# Patient Record
Sex: MALE | Race: WHITE | Employment: OTHER | URBAN - METROPOLITAN AREA
[De-identification: names, ages, dates, MRNs, and addresses within clinical notes are randomized per-mention and may not be internally consistent; named-entity substitution may affect disease eponyms.]

---

## 2018-04-18 PROBLEM — N18.30 CHRONIC RENAL INSUFFICIENCY, STAGE III (MODERATE) (HCC): Status: ACTIVE | Noted: 2017-11-07

## 2018-04-18 PROBLEM — E78.2 MIXED HYPERLIPIDEMIA: Status: ACTIVE | Noted: 2017-04-04

## 2018-04-18 PROBLEM — C61 PROSTATE CANCER (HCC): Status: ACTIVE | Noted: 2018-04-18

## 2018-04-18 PROBLEM — I10 HYPERTENSION: Status: ACTIVE | Noted: 2018-04-18

## 2020-10-07 ENCOUNTER — HOSPITAL ENCOUNTER (EMERGENCY)
Age: 70
Discharge: HOME | End: 2020-10-07
Attending: EMERGENCY MEDICINE
Payer: MEDICARE

## 2020-10-07 VITALS
HEART RATE: 72 BPM | TEMPERATURE: 98 F | RESPIRATION RATE: 16 BRPM | DIASTOLIC BLOOD PRESSURE: 66 MMHG | HEIGHT: 69 IN | BODY MASS INDEX: 31.1 KG/M2 | WEIGHT: 210 LBS | SYSTOLIC BLOOD PRESSURE: 101 MMHG | OXYGEN SATURATION: 98 %

## 2020-10-07 DIAGNOSIS — M25.511 ACUTE PAIN OF RIGHT SHOULDER: Primary | ICD-10-CM

## 2020-10-07 PROCEDURE — 96372 THER/PROPH/DIAG INJ SC/IM: CPT

## 2020-10-07 PROCEDURE — 74011000250 HC RX REV CODE- 250: Performed by: EMERGENCY MEDICINE

## 2020-10-07 PROCEDURE — 74011250636 HC RX REV CODE- 250/636: Performed by: EMERGENCY MEDICINE

## 2020-10-07 PROCEDURE — 99283 EMERGENCY DEPT VISIT LOW MDM: CPT

## 2020-10-07 PROCEDURE — 74011250637 HC RX REV CODE- 250/637: Performed by: EMERGENCY MEDICINE

## 2020-10-07 RX ORDER — LIDOCAINE 50 MG/G
PATCH TOPICAL
Qty: 5 EACH | Refills: 0 | Status: SHIPPED | OUTPATIENT
Start: 2020-10-07 | End: 2020-10-16

## 2020-10-07 RX ORDER — NAPROXEN 500 MG/1
500 TABLET ORAL 2 TIMES DAILY WITH MEALS
Qty: 20 TAB | Refills: 0 | Status: SHIPPED | OUTPATIENT
Start: 2020-10-07 | End: 2020-10-16

## 2020-10-07 RX ORDER — ONDANSETRON 4 MG/1
4 TABLET, ORALLY DISINTEGRATING ORAL
Status: COMPLETED | OUTPATIENT
Start: 2020-10-07 | End: 2020-10-07

## 2020-10-07 RX ORDER — LIDOCAINE 4 G/100G
2 PATCH TOPICAL
Status: DISCONTINUED | OUTPATIENT
Start: 2020-10-07 | End: 2020-10-08 | Stop reason: HOSPADM

## 2020-10-07 RX ORDER — NAPROXEN 250 MG/1
500 TABLET ORAL
Status: COMPLETED | OUTPATIENT
Start: 2020-10-07 | End: 2020-10-07

## 2020-10-07 RX ORDER — MORPHINE SULFATE 4 MG/ML
4 INJECTION INTRAVENOUS
Status: COMPLETED | OUTPATIENT
Start: 2020-10-07 | End: 2020-10-07

## 2020-10-07 RX ORDER — MORPHINE SULFATE 15 MG/1
15 TABLET ORAL
Qty: 11 TAB | Refills: 0 | Status: SHIPPED | OUTPATIENT
Start: 2020-10-07 | End: 2020-10-16

## 2020-10-07 RX ADMIN — MORPHINE SULFATE 4 MG: 4 INJECTION INTRAVENOUS at 20:20

## 2020-10-07 RX ADMIN — ONDANSETRON 4 MG: 4 TABLET, ORALLY DISINTEGRATING ORAL at 20:19

## 2020-10-07 RX ADMIN — NAPROXEN 500 MG: 250 TABLET ORAL at 21:21

## 2020-10-07 NOTE — ED PROVIDER NOTES
[facility] Emergency Department  Arrival Date/Time: 10/7/2020 @ Karen Parish  MRN: [id number]      72 y.o. male    YOB: 1950   Telephone Information:   Mobile [phone] (home)     [facility] EMERGENCY DEPT HE/E  Seen on 10/7/2020 @ 7:46 PM      Today's Chief Complaint:   Chief Complaint   Patient presents with    Shoulder Pain     HPI: 41-year-old male who injured his rotator cuff doing bicep curls several weeks ago. Had an MRI which showed an injury. He is seeing Dr. Dung Pate at orthopedics and is scheduled for a rotator cuff repair in 2 days. He and his wife state that the pain medication he was given is not working. He has difficulty with taking other medications secondary to significant allergy. He states he is not slept for more than a couple hours in 7 days secondary to pain. States that the pain is not too bad during the day but as soon as he tries to lay down or even sit up to sleep it starts to hurt and wakes him up    No alleviating. He tried over-the-counter medication slings various pain medication various positions    No associated symptoms. HPI    Review of Systems: Review of Systems   Constitutional: Negative. Musculoskeletal: Negative for back pain. Past Medical History: Primary Care Doctor: Anushka Johnson, DO  Meds, Medical Hx,Surgical Hx, Family Hx, Social Hx are reviewed and at end of this note     Allergies: Allergies   Allergen Reactions    Hydrocodone Itching    Codeine Other (comments)     convulsions         Key Anti-Platelet Anticoagulant Meds     The patient is on no antiplatelet meds or anticoagulants. Physical Exam:  Nursing documentation reviewed. Patient Vitals for the past 24 hrs:   Temp Pulse Resp BP SpO2   10/07/20 1846 98 °F (36.7 °C) 72 16 101/66 98 %    Vital signs were reviewed. Physical Exam  Vitals signs and nursing note reviewed.    Constitutional:       General: He is not in acute distress. Appearance: Normal appearance. Eyes:      Pupils: Pupils are equal, round, and reactive to light. Neck:      Musculoskeletal: Normal range of motion. Cardiovascular:      Rate and Rhythm: Normal rate and regular rhythm. Pulses: Normal pulses. Heart sounds: Normal heart sounds. Musculoskeletal:      Comments: ttp of the right shoulder - no deformity   Neurological:      Mental Status: He is alert and oriented to person, place, and time. MEDICAL DECISION MAKING: (Including Differential Dx, and Plan)   73 yo male with right shoulder pain awaiting rotator cuff surgery on Friday      Plan: treat pain   MDM  This is a new problem that does not need additional workup   Labs/Radiographs/ECG were ordered:    CT/US/XRay/MRI were visualized by me:    Obtained and Reviewed Old Records or History from someone other than the patient: yes -- chart and wife     The patient's problem is:  moderate    Diagnostic Options are:   risk    Management Options are:  high risk     Data/Management:  (sandra)   Lab findings during this visit:   Recent Results (from the past 50 hour(s))   NOVEL CORONAVIRUS (COVID-19)    Collection Time: 10/06/20 10:49 AM   Result Value Ref Range    SARS-CoV-2, AMANDA Not Detected Not Detected   INPATIENT    Collection Time: 10/06/20 10:49 AM   Result Value Ref Range    Inpatient Comment      Radiology studies during this visit: No results found. Medications given in the ED:   Medications   lidocaine 4 % patch 2 Patch (2 Patches TransDERmal Apply Patch 10/7/20 2053)   morphine injection 4 mg (4 mg IntraMUSCular Given 10/7/20 2020)   ondansetron (ZOFRAN ODT) tablet 4 mg (4 mg Oral Given 10/7/20 2019)   naproxen (NAPROSYN) tablet 500 mg (500 mg Oral Given 10/7/20 2121)        Procedure Documentation:    (.addlac  .addabscess   . addreduction   . addintubation    . addprocdoc)      Procedures    Recheck and Additional Documentation:  (use .addrecheck  . addsepsis   . addstroke .addhip  . addhandoff  . addcctime . emergcnt )     Patient given shot of morphine. Improvement in his pain. No rash or lesions are noted. No difficulty breathing nausea    We will start him on a Lidoderm patch. Morphine IR for home. Discharged to follow-up Dr. Livier Laws for surgery. Other ED Course Notes:        I wore appropriate PPE throughout this patient's ED encounter. Assessment and Plan:      Disposition:   Discharged    Condition @ Disposition   stable   Time of Disposition   2059         Impression:     ICD-10-CM ICD-9-CM   1. Acute pain of right shoulder  M25.511 719.41        Follow-up:   Follow-up Information     Follow up With Specialties Details Why [facility] EMERGENCY DEPT Emergency Medicine  Come back to the ED if you get worse or develop any new problems [id number] [id number] [phone]         Discharge Medications:   Discharge Medication List as of 10/7/2020  8:59 PM      START taking these medications    Details   morphine IR (MS IR) 15 mg tablet Take 1 Tab by mouth every four (4) hours as needed for Pain for up to 3 days. Max Daily Amount: 90 mg., Print, Disp-11 Tab,R-0      lidocaine (Lidoderm) 5 % Apply patch to the affected area for 12 hours a day and remove for 12 hours a day., Print, Disp-5 Each,R-0      naproxen (Naprosyn) 500 mg tablet Take 1 Tab by mouth two (2) times daily (with meals) for 10 days. , Print, Disp-20 Tab,R-0         CONTINUE these medications which have NOT CHANGED    Details   atorvastatin (LIPITOR) 40 mg tablet TAKE 1 TABLET BY MOUTH EVERY DAY AT NIGHT, Normal, Disp-90 Tab,R-1      omega-3 fatty acids/fish oil (FISH OIL OMEGA 3-6-9 PO) Take  by mouth., Historical Med              Past Medical History:      Past Medical History:   Diagnosis Date    AION (acute ischemic optic neuropathy), bilateral     Hypercholesterolemia     Hypertension     Prostate cancer ([facility] [address] 2009    Wears hearing aid in both ears Past Surgical History:   Procedure Laterality Date    HX PROSTATE SURGERY      HX TONSIL AND ADENOIDECTOMY      HX TONSILLECTOMY      HX UROLOGICAL       Family History   Problem Relation Age of Onset    Diabetes Mother     Heart Disease Father     Cancer Sister     Heart Disease Sister       Social History     Tobacco Use    Smoking status: Former Smoker     Types: Cigarettes    Smokeless tobacco: Never Used   Substance Use Topics    Alcohol use: No    Drug use: No     Prior to Admission Medications   Prescriptions Last Dose Informant Patient Reported? Taking?   atorvastatin (LIPITOR) 40 mg tablet   No No   Sig: TAKE 1 TABLET BY MOUTH EVERY DAY AT NIGHT   omega-3 fatty acids/fish oil (FISH OIL OMEGA 3-6-9 PO)   Yes No   Sig: Take  by mouth.       Facility-Administered Medications: None

## 2020-10-08 ENCOUNTER — ANESTHESIA EVENT (OUTPATIENT)
Dept: SURGERY | Age: 70
DRG: 871 | End: 2020-10-08
Payer: MEDICARE

## 2020-10-08 NOTE — ED NOTES
I have reviewed discharge instructions with the patient. The patient verbalized understanding. Patient left ED via Discharge Method: ambulatory to Home with wife). Opportunity for questions and clarification provided. Patient given 3 scripts. To continue your aftercare when you leave the hospital, you may receive an automated call from our care team to check in on how you are doing. This is a free service and part of our promise to provide the best care and service to meet your aftercare needs.  If you have questions, or wish to unsubscribe from this service please call [phone]. Thank you for Choosing our [facility] Emergency Department.

## 2020-10-09 ENCOUNTER — HOSPITAL ENCOUNTER (OUTPATIENT)
Age: 70
Setting detail: OUTPATIENT SURGERY
Discharge: HOME | DRG: 871 | End: 2020-10-09
Attending: ORTHOPAEDIC SURGERY | Admitting: ORTHOPAEDIC SURGERY
Payer: MEDICARE

## 2020-10-09 ENCOUNTER — ANESTHESIA (OUTPATIENT)
Dept: SURGERY | Age: 70
DRG: 871 | End: 2020-10-09
Payer: MEDICARE

## 2020-10-09 VITALS
BODY MASS INDEX: 30.57 KG/M2 | TEMPERATURE: 97.6 F | WEIGHT: 207 LBS | OXYGEN SATURATION: 91 % | DIASTOLIC BLOOD PRESSURE: 65 MMHG | HEART RATE: 54 BPM | RESPIRATION RATE: 14 BRPM | SYSTOLIC BLOOD PRESSURE: 143 MMHG

## 2020-10-09 LAB — POTASSIUM BLD-SCNC: 4.2 MMOL/L (ref 3.5–5.1)

## 2020-10-09 PROCEDURE — 77030018673: Performed by: ORTHOPAEDIC SURGERY

## 2020-10-09 PROCEDURE — 77030010430: Performed by: ORTHOPAEDIC SURGERY

## 2020-10-09 PROCEDURE — 74011250636 HC RX REV CODE- 250/636: Performed by: NURSE ANESTHETIST, CERTIFIED REGISTERED

## 2020-10-09 PROCEDURE — 76010000161 HC OR TIME 1 TO 1.5 HR INTENSV-TIER 1: Performed by: ORTHOPAEDIC SURGERY

## 2020-10-09 PROCEDURE — 77030002960 HC SUT PASS S&N -C: Performed by: ORTHOPAEDIC SURGERY

## 2020-10-09 PROCEDURE — 76210000021 HC REC RM PH II 0.5 TO 1 HR: Performed by: ORTHOPAEDIC SURGERY

## 2020-10-09 PROCEDURE — 77030010509 HC AIRWY LMA MSK TELE -A: Performed by: ANESTHESIOLOGY

## 2020-10-09 PROCEDURE — C1713 ANCHOR/SCREW BN/BN,TIS/BN: HCPCS | Performed by: ORTHOPAEDIC SURGERY

## 2020-10-09 PROCEDURE — 74011250636 HC RX REV CODE- 250/636: Performed by: PHYSICIAN ASSISTANT

## 2020-10-09 PROCEDURE — 74011000250 HC RX REV CODE- 250: Performed by: NURSE ANESTHETIST, CERTIFIED REGISTERED

## 2020-10-09 PROCEDURE — 77030036560 HC SHLDR ARM PAD ABDUCTN S2SG -B: Performed by: ORTHOPAEDIC SURGERY

## 2020-10-09 PROCEDURE — 77030020275 HC MISC ORTHOPEDIC: Performed by: ORTHOPAEDIC SURGERY

## 2020-10-09 PROCEDURE — 77030006668 HC BLD SHV MENSCS STRY -B: Performed by: ORTHOPAEDIC SURGERY

## 2020-10-09 PROCEDURE — 74011250637 HC RX REV CODE- 250/637: Performed by: ANESTHESIOLOGY

## 2020-10-09 PROCEDURE — 77030033005 HC TBNG ARTHSC PMP STRY -B: Performed by: ORTHOPAEDIC SURGERY

## 2020-10-09 PROCEDURE — 77030041183 HC KT ACCESS POS ACUFX SN -B: Performed by: ORTHOPAEDIC SURGERY

## 2020-10-09 PROCEDURE — 77030027384 HC PRB ARTHSCP SERFAS STRY -C: Performed by: ORTHOPAEDIC SURGERY

## 2020-10-09 PROCEDURE — 77030002933 HC SUT MCRYL J&J -A: Performed by: ORTHOPAEDIC SURGERY

## 2020-10-09 PROCEDURE — 77030010427: Performed by: ORTHOPAEDIC SURGERY

## 2020-10-09 PROCEDURE — 76010010054 HC POST OP PAIN BLOCK: Performed by: ORTHOPAEDIC SURGERY

## 2020-10-09 PROCEDURE — 77030019605: Performed by: ORTHOPAEDIC SURGERY

## 2020-10-09 PROCEDURE — 76060000033 HC ANESTHESIA 1 TO 1.5 HR: Performed by: ORTHOPAEDIC SURGERY

## 2020-10-09 PROCEDURE — 74011250636 HC RX REV CODE- 250/636: Performed by: ANESTHESIOLOGY

## 2020-10-09 PROCEDURE — 77030004453 HC BUR SHV STRY -B: Performed by: ORTHOPAEDIC SURGERY

## 2020-10-09 PROCEDURE — 2709999900 HC NON-CHARGEABLE SUPPLY: Performed by: ORTHOPAEDIC SURGERY

## 2020-10-09 PROCEDURE — 76210000063 HC OR PH I REC FIRST 0.5 HR: Performed by: ORTHOPAEDIC SURGERY

## 2020-10-09 PROCEDURE — 74011250636 HC RX REV CODE- 250/636: Performed by: ORTHOPAEDIC SURGERY

## 2020-10-09 PROCEDURE — 77030040361 HC SLV COMPR DVT MDII -B: Performed by: ORTHOPAEDIC SURGERY

## 2020-10-09 PROCEDURE — 77030003666 HC NDL SPINAL BD -A: Performed by: ORTHOPAEDIC SURGERY

## 2020-10-09 PROCEDURE — 76942 ECHO GUIDE FOR BIOPSY: CPT | Performed by: ORTHOPAEDIC SURGERY

## 2020-10-09 PROCEDURE — 84132 ASSAY OF SERUM POTASSIUM: CPT

## 2020-10-09 PROCEDURE — 77030003602 HC NDL NRV BLK BBMI -B: Performed by: ANESTHESIOLOGY

## 2020-10-09 RX ORDER — FENTANYL CITRATE 50 UG/ML
100 INJECTION, SOLUTION INTRAMUSCULAR; INTRAVENOUS AS NEEDED
Status: COMPLETED | OUTPATIENT
Start: 2020-10-09 | End: 2020-10-09

## 2020-10-09 RX ORDER — NALOXONE HYDROCHLORIDE 0.4 MG/ML
0.1 INJECTION, SOLUTION INTRAMUSCULAR; INTRAVENOUS; SUBCUTANEOUS AS NEEDED
Status: DISCONTINUED | OUTPATIENT
Start: 2020-10-09 | End: 2020-10-09 | Stop reason: HOSPADM

## 2020-10-09 RX ORDER — HYDROMORPHONE HYDROCHLORIDE 2 MG/ML
0.5 INJECTION, SOLUTION INTRAMUSCULAR; INTRAVENOUS; SUBCUTANEOUS
Status: DISCONTINUED | OUTPATIENT
Start: 2020-10-09 | End: 2020-10-09 | Stop reason: HOSPADM

## 2020-10-09 RX ORDER — ONDANSETRON 2 MG/ML
INJECTION INTRAMUSCULAR; INTRAVENOUS AS NEEDED
Status: DISCONTINUED | OUTPATIENT
Start: 2020-10-09 | End: 2020-10-09 | Stop reason: HOSPADM

## 2020-10-09 RX ORDER — CEFAZOLIN SODIUM/WATER 2 G/20 ML
2 SYRINGE (ML) INTRAVENOUS ONCE
Status: COMPLETED | OUTPATIENT
Start: 2020-10-09 | End: 2020-10-09

## 2020-10-09 RX ORDER — ROPIVACAINE HYDROCHLORIDE 5 MG/ML
INJECTION, SOLUTION EPIDURAL; INFILTRATION; PERINEURAL
Status: COMPLETED | OUTPATIENT
Start: 2020-10-09 | End: 2020-10-09

## 2020-10-09 RX ORDER — DIPHENHYDRAMINE HYDROCHLORIDE 50 MG/ML
12.5 INJECTION, SOLUTION INTRAMUSCULAR; INTRAVENOUS
Status: DISCONTINUED | OUTPATIENT
Start: 2020-10-09 | End: 2020-10-09 | Stop reason: HOSPADM

## 2020-10-09 RX ORDER — SODIUM CHLORIDE, SODIUM LACTATE, POTASSIUM CHLORIDE, CALCIUM CHLORIDE 600; 310; 30; 20 MG/100ML; MG/100ML; MG/100ML; MG/100ML
75 INJECTION, SOLUTION INTRAVENOUS CONTINUOUS
Status: DISCONTINUED | OUTPATIENT
Start: 2020-10-09 | End: 2020-10-09 | Stop reason: HOSPADM

## 2020-10-09 RX ORDER — EPINEPHRINE 1 MG/ML
INJECTION, SOLUTION, CONCENTRATE INTRAVENOUS AS NEEDED
Status: DISCONTINUED | OUTPATIENT
Start: 2020-10-09 | End: 2020-10-09 | Stop reason: HOSPADM

## 2020-10-09 RX ORDER — EPHEDRINE SULFATE/0.9% NACL/PF 50 MG/5 ML
SYRINGE (ML) INTRAVENOUS AS NEEDED
Status: DISCONTINUED | OUTPATIENT
Start: 2020-10-09 | End: 2020-10-09 | Stop reason: HOSPADM

## 2020-10-09 RX ORDER — PROPOFOL 10 MG/ML
INJECTION, EMULSION INTRAVENOUS AS NEEDED
Status: DISCONTINUED | OUTPATIENT
Start: 2020-10-09 | End: 2020-10-09 | Stop reason: HOSPADM

## 2020-10-09 RX ORDER — LIDOCAINE HYDROCHLORIDE 10 MG/ML
0.1 INJECTION INFILTRATION; PERINEURAL AS NEEDED
Status: DISCONTINUED | OUTPATIENT
Start: 2020-10-09 | End: 2020-10-09 | Stop reason: HOSPADM

## 2020-10-09 RX ORDER — LIDOCAINE HYDROCHLORIDE 20 MG/ML
INJECTION, SOLUTION EPIDURAL; INFILTRATION; INTRACAUDAL; PERINEURAL AS NEEDED
Status: DISCONTINUED | OUTPATIENT
Start: 2020-10-09 | End: 2020-10-09 | Stop reason: HOSPADM

## 2020-10-09 RX ORDER — FLUMAZENIL 0.1 MG/ML
0.2 INJECTION INTRAVENOUS
Status: DISCONTINUED | OUTPATIENT
Start: 2020-10-09 | End: 2020-10-09 | Stop reason: HOSPADM

## 2020-10-09 RX ORDER — DEXAMETHASONE SODIUM PHOSPHATE 4 MG/ML
INJECTION, SOLUTION INTRA-ARTICULAR; INTRALESIONAL; INTRAMUSCULAR; INTRAVENOUS; SOFT TISSUE
Status: COMPLETED | OUTPATIENT
Start: 2020-10-09 | End: 2020-10-09

## 2020-10-09 RX ORDER — GLYCOPYRROLATE 0.2 MG/ML
INJECTION INTRAMUSCULAR; INTRAVENOUS AS NEEDED
Status: DISCONTINUED | OUTPATIENT
Start: 2020-10-09 | End: 2020-10-09 | Stop reason: HOSPADM

## 2020-10-09 RX ORDER — GABAPENTIN 300 MG/1
300 CAPSULE ORAL ONCE
Status: COMPLETED | OUTPATIENT
Start: 2020-10-09 | End: 2020-10-09

## 2020-10-09 RX ORDER — MIDAZOLAM HYDROCHLORIDE 1 MG/ML
2 INJECTION, SOLUTION INTRAMUSCULAR; INTRAVENOUS
Status: COMPLETED | OUTPATIENT
Start: 2020-10-09 | End: 2020-10-09

## 2020-10-09 RX ORDER — DEXAMETHASONE SODIUM PHOSPHATE 4 MG/ML
INJECTION, SOLUTION INTRA-ARTICULAR; INTRALESIONAL; INTRAMUSCULAR; INTRAVENOUS; SOFT TISSUE AS NEEDED
Status: DISCONTINUED | OUTPATIENT
Start: 2020-10-09 | End: 2020-10-09 | Stop reason: HOSPADM

## 2020-10-09 RX ORDER — SODIUM CHLORIDE, SODIUM LACTATE, POTASSIUM CHLORIDE, CALCIUM CHLORIDE 600; 310; 30; 20 MG/100ML; MG/100ML; MG/100ML; MG/100ML
100 INJECTION, SOLUTION INTRAVENOUS CONTINUOUS
Status: DISCONTINUED | OUTPATIENT
Start: 2020-10-09 | End: 2020-10-09 | Stop reason: HOSPADM

## 2020-10-09 RX ORDER — ACETAMINOPHEN 500 MG
1000 TABLET ORAL ONCE
Status: COMPLETED | OUTPATIENT
Start: 2020-10-09 | End: 2020-10-09

## 2020-10-09 RX ORDER — OXYCODONE HYDROCHLORIDE 5 MG/1
5 TABLET ORAL
Status: DISCONTINUED | OUTPATIENT
Start: 2020-10-09 | End: 2020-10-09 | Stop reason: HOSPADM

## 2020-10-09 RX ADMIN — GABAPENTIN 300 MG: 300 CAPSULE ORAL at 09:21

## 2020-10-09 RX ADMIN — PHENYLEPHRINE HYDROCHLORIDE 100 MCG: 10 INJECTION INTRAVENOUS at 11:13

## 2020-10-09 RX ADMIN — SODIUM CHLORIDE, SODIUM LACTATE, POTASSIUM CHLORIDE, AND CALCIUM CHLORIDE: 600; 310; 30; 20 INJECTION, SOLUTION INTRAVENOUS at 11:06

## 2020-10-09 RX ADMIN — ACETAMINOPHEN 1000 MG: 500 TABLET, FILM COATED ORAL at 09:21

## 2020-10-09 RX ADMIN — LIDOCAINE HYDROCHLORIDE 100 MG: 20 INJECTION, SOLUTION EPIDURAL; INFILTRATION; INTRACAUDAL; PERINEURAL at 10:25

## 2020-10-09 RX ADMIN — ROPIVACAINE HYDROCHLORIDE 30 ML: 150 INJECTION, SOLUTION EPIDURAL; INFILTRATION; PERINEURAL at 10:00

## 2020-10-09 RX ADMIN — Medication 10 MG: at 10:40

## 2020-10-09 RX ADMIN — DEXAMETHASONE SODIUM PHOSPHATE 10 MG: 4 INJECTION, SOLUTION INTRAMUSCULAR; INTRAVENOUS at 10:40

## 2020-10-09 RX ADMIN — Medication 2 G: at 10:35

## 2020-10-09 RX ADMIN — PHENYLEPHRINE HYDROCHLORIDE 100 MCG: 10 INJECTION INTRAVENOUS at 11:02

## 2020-10-09 RX ADMIN — DEXAMETHASONE SODIUM PHOSPHATE 4 MG: 4 INJECTION, SOLUTION INTRAMUSCULAR; INTRAVENOUS at 10:00

## 2020-10-09 RX ADMIN — PHENYLEPHRINE HYDROCHLORIDE 100 MCG: 10 INJECTION INTRAVENOUS at 10:49

## 2020-10-09 RX ADMIN — FENTANYL CITRATE 50 MCG: 50 INJECTION INTRAMUSCULAR; INTRAVENOUS at 09:59

## 2020-10-09 RX ADMIN — PROPOFOL 200 MG: 10 INJECTION, EMULSION INTRAVENOUS at 10:25

## 2020-10-09 RX ADMIN — GLYCOPYRROLATE 0.2 MG: 0.2 INJECTION, SOLUTION INTRAMUSCULAR; INTRAVENOUS at 10:33

## 2020-10-09 RX ADMIN — ONDANSETRON 4 MG: 2 INJECTION INTRAMUSCULAR; INTRAVENOUS at 10:40

## 2020-10-09 RX ADMIN — MIDAZOLAM 2 MG: 1 INJECTION INTRAMUSCULAR; INTRAVENOUS at 09:59

## 2020-10-09 RX ADMIN — Medication 12.5 MG: at 10:32

## 2020-10-09 RX ADMIN — SODIUM CHLORIDE, SODIUM LACTATE, POTASSIUM CHLORIDE, AND CALCIUM CHLORIDE 100 ML/HR: 600; 310; 30; 20 INJECTION, SOLUTION INTRAVENOUS at 09:21

## 2020-10-09 NOTE — H&P
Outpatient Surgery History and Physical:  Leonard Perez was seen and examined. CHIEF COMPLAINT:   Right shoulder pain. PE:     Visit Vitals  BP (!) 191/86 (BP 1 Location: Left arm)   Pulse (!) 54   Temp 98.1 °F (36.7 °C)   Resp 16   Wt 93.9 kg (207 lb)   SpO2 96%   BMI 30.57 kg/m²       Heart:   Regular rhythm      Lungs:  Are clear      Past Medical History:    Patient Active Problem List    Diagnosis    Hypertension     Last Assessment & Plan:   Blood pressure at goal, continue current treatment      Prostate cancer ([facility] [address]     Overview:   s/p postatectomy    Last Assessment & Plan:   Status post prostatectomy most recent PSA 0.01 recheck yearly      Chronic renal insufficiency, stage III (moderate)     Last Assessment & Plan:   Renal function stable. Strongly encouraged to avoid all anti-inflammatories      Mixed hyperlipidemia     Last Assessment & Plan:   Lipids at goal continue current treatment      Erectile dysfunction following simple prostatectomy     Last Assessment & Plan:   Medications refilled at mail order pharmacy         Surgical History:   Past Surgical History:   Procedure Laterality Date    HX HEART CATHETERIZATION  2011    no intervention, normal    HX PROSTATE SURGERY  11/2016    HX TONSIL AND ADENOIDECTOMY         Social History: Patient  reports that he quit smoking about 49 years ago. His smoking use included cigarettes. He has never used smokeless tobacco. He reports that he does not drink alcohol or use drugs. Family History:   Family History   Problem Relation Age of Onset    Diabetes Mother     Heart Disease Father     Cancer Sister     Heart Disease Sister        Allergies: Reviewed per EMR  Allergies   Allergen Reactions    Hydrocodone Itching     Pt states incorrect/ allergy is to oxycodone.  Pt states he can tolerate hydrocodone 10/8/20 Please remove    Codeine Other (comments)     convulsions    Oxycodone Itching     Can tolerate Hydrocodone       Medications: No current facility-administered medications on file prior to encounter. Current Outpatient Medications on File Prior to Encounter   Medication Sig    atorvastatin (LIPITOR) 40 mg tablet TAKE 1 TABLET BY MOUTH EVERY DAY AT NIGHT (Patient taking differently: Take 40 mg by mouth nightly. TAKE 1 TABLET BY MOUTH EVERY DAY AT NIGHT)    omega-3 fatty acids/fish oil (FISH OIL OMEGA 3-6-9 PO) Take  by mouth. The surgery is planned for the right shoulder arthroscopy rotator cuff repair. History and physical has been reviewed. The patient has been examined. There have been no significant clinical changes since the completion of the originally dated History and Physical.  Patient identified by surgeon; surgical site was confirmed by patient and surgeon. The patient is here today for outpatient surgery. I have examined the patient, no changes are noted in the patient's medical status. Necessity for the procedure/care is still present and the history and physical above is current. See the office notes for the full long term history of the problem. Please see the recent office notes for the musculoskeletal examination.     Signed By: JAMAICA Rodriguez     October 9, 2020 9:07 AM

## 2020-10-09 NOTE — ANESTHESIA POSTPROCEDURE EVALUATION
Procedure(s):  RIGHT SHOULDER ARTHROSCOPY/ ROTATOR CUFF REPAIR.     general    Anesthesia Post Evaluation      Multimodal analgesia: multimodal analgesia used between 6 hours prior to anesthesia start to PACU discharge  Patient location during evaluation: PACU  Patient participation: complete - patient participated  Level of consciousness: awake and alert  Pain management: adequate  Airway patency: patent  Anesthetic complications: no  Cardiovascular status: acceptable  Respiratory status: acceptable  Hydration status: acceptable  Post anesthesia nausea and vomiting:  controlled  Final Post Anesthesia Temperature Assessment:  Normothermia (36.0-37.5 degrees C)      INITIAL Post-op Vital signs:   Vitals Value Taken Time   /57 10/9/2020 11:42 AM   Temp 36.4 °C (97.6 °F) 10/9/2020 11:27 AM   Pulse 52 10/9/2020 11:42 AM   Resp 16 10/9/2020 11:42 AM   SpO2 93 % 10/9/2020 11:42 AM

## 2020-10-09 NOTE — OP NOTES
[facility]  OPERATIVE REPORT    Name:  Carolynn Babinski  MR#:  [id number]  :  1950  ACCOUNT #:  [de-identified]  DATE OF SERVICE:  10/09/2020    PREOPERATIVE DIAGNOSES:  1. Rotator cuff tear. 2.  Acromioclavicular arthritis. 3.  Superior and posterior labral tearing. 4.  Long head biceps tear. 5.  Impingement, right shoulder. POSTOPERATIVE DIAGNOSES:  1. Rotator cuff tear. 2.  Acromioclavicular arthritis. 3.  Superior and posterior labral tearing. 4.  Long head biceps tear. 5.  Impingement, right shoulder. PROCEDURE PERFORMED:  1. Arthroscopic rotator cuff repair - CPT 41989.  2.  Arthroscopic distal clavicle excision (Barbara procedure) - CPT 39278.  3.  Debridement of labral tearing - extensive debridement - CPT 65466.  4.  Debridement of biceps tear-  extensive debridement - CPT 99901.  5.  Arthroscopic subacromial decompression - CPT 50674, right shoulder. SURGEON:  Jasmyn Santiago MD    ASSISTANT:  Liana Antonio Picramirez [place]    ANESTHESIA:  General.    FLUIDS:  Crystalloid. COMPLICATIONS:  None. SPECIMENS REMOVED:  None. IMPLANTS:  Yes, see record. ESTIMATED BLOOD LOSS:  Minimal.    FINDINGS:  There was a high-grade partial tear involving the supraspinatus and infraspinatus. This was approximately 15-18 mm with a small full-thickness component on the supraspinatus. There was long head biceps tearing that was complete with a retained stump. There was some superior and posterior labral tearing. There was no significant glenohumeral arthritis. There was an anterior-inferior acromial slope. There was undersurface osteophyte formation and degenerative change of the AC joint. DESCRIPTION OF PROCEDURE:  After informed consent, the patient was brought to the operating room and placed on the operating room table in supine position. General anesthesia was administered without difficulty. The patient was placed in the lateral decubitus position.   All pressure points were inspected and padded appropriately. The right upper extremity was suspended by traction. Right shoulder was prepped and draped in sterile fashion. Glenohumeral joint was insufflated with 50 mL of sterile saline and a posterior portal was established. Glenohumeral joint was then arthroscoped in a sequential manner. The aforementioned findings were noted. An anterior portal was established. Labral tearing was debrided smooth. All loose flaps of tissue were removed. There were no sharp edges or unstable fragments after the labral debridement. Long head biceps tearing was debrided back to smooth stable area. There were no sharp edges or unstable fragments after the biceps debridement. Undersurface rotator cuff tearing was debrided back to smooth stable area. Scope was brought into the subacromial space. A lateral portal was established. Bursal proliferative tissue was excised. The undersurface of the acromion was exposed and an acromioplasty was performed. All of the acromion anterior to the leading edge of the distal clavicle was excised to a depth of 5-6 mm and 2 cm anterior to posterior direction was removed. This opened up the subacromial space nicely. Attention was then directed to the distal clavicle. Through both the anterior and lateral portals, a circumferential distal clavicle excision was performed. 10 mm of distal clavicle was excised. Circumferential excision was verified arthroscopically. The Barbara procedure was performed without difficulty. Attention was then directed to the rotator cuff. The high-grade partial tear was converted to a full-thickness tear. The area underneath the original insertion was lightly decorticated. The tear was easily mobilized back to its original insertion. Two HealiCoil anchors and four ULTRATAPE sutures in a mattress fashion were used to anatomically repair the tear. Complete repair of the rotator cuff was performed.   The rotator cuff tear was repaired without difficulty. Shoulder was thoroughly irrigated. Portals were closed. Sterile dressings were applied. The patient was extubated and taken to the recovery room in stable condition. JAMAICA Cole, assisted during the procedure. He was necessary for patient positioning, wound closure, and assistance with the major portions of the operation. His presence decreased the operative time and potential complication rate.       MD SEB [email or web address]  D:  10/09/2020 11:23  T:  10/09/2020 12:11  JOB #:  [id number]

## 2020-10-09 NOTE — ANESTHESIA PROCEDURE NOTES
Peripheral Block    Start time: 10/9/2020 9:59 AM  End time: 10/9/2020 10:02 AM  Performed by: Kassandra Petty MD  Authorized by: Kassandra Petty MD       Pre-procedure: Indications: at surgeon's request and post-op pain management    Preanesthetic Checklist: patient identified, risks and benefits discussed, site marked, timeout performed, anesthesia consent given and patient being monitored    Timeout Time: 09:59          Block Type:   Block Type:   Interscalene  Laterality:  Right  Monitoring:  Standard ASA monitoring, continuous pulse ox, heart rate, oxygen, responsive to questions and frequent vital sign checks  Injection Technique:  Single shot  Procedures: ultrasound guided    Patient Position: seated  Prep: chlorhexidine    Location:  Interscalene  Needle Type:  Stimuplex  Needle Gauge:  20 G  Needle Localization:  Ultrasound guidance  Medication Injected:  Ropivacaine (PF) (NAROPIN)(0.5%) 5 mg/mL injection, 30 mL  dexamethasone (DECADRON) 4 mg/mL injection, 4 mg  Med Admin Time: 10/9/2020 10:00 AM    Assessment:  Number of attempts:  1  Injection Assessment:  Incremental injection every 5 mL, local visualized surrounding nerve on ultrasound, negative aspiration for CSF, negative aspiration for blood, no paresthesia, no intravascular symptoms and ultrasound image on chart  Patient tolerance:  Patient tolerated the procedure well with no immediate complications

## 2020-10-09 NOTE — DISCHARGE INSTRUCTIONS
Post-Operative Instructions   For  Shoulder Arthroscopy Rotator Cuff Repair  Phone:  [phone]    1. If you do not have an \"Iceman\" type cooling unit, for the first 48-72 hours following surgery, use ice on the shoulder every two hours (while awake) for 20-30 minutes at a time to help prevent swelling and lessen pain. If you have a cooling unit, follow the instructions given to you- continually as much as possible the first 48-72 hours, then 3-4 times a day for 4 weeks. 2. You may shower after the arthroscopy. Keep dressings in place for the first three days. After three days, you may remove the dressings and leave the steri-strip bandages in place; they will peel off naturally. 3. Stay in sling at all times except to shower. 4. Begin therapy as ordered. 5. Use any pain medication as instructed. You should take your pain medication as soon as you feel the anesthetic wearing off. Do not wait until you are in severe pain to begin taking your pain medication. 6. You may have some side effects from your pain medication. If you have nausea, try taking your medication with food. For itching, you may take over the counter Benadryl. 7. You may have been given a prescription for Zofran or Phenergan. This medication is used for nausea and vomiting. You do not need to get this prescription filled unless you have a problem. 8. If you have a problem, please call [address] [place] [address] at [phone]    [address] [place] [address], [facility] What to Expect After a Nerve Block  Nerve blocks affect many types of nerves. The affected nerves control movement, pain, and normal sensation. This causes feelings such as:    · Weakness  · Numbness  · Tingling  · Heaviness  · A feeling that your arm or leg has \"fallen asleep\"    A nerve block can last for 24-48 hours, depending on the medications used.   Usually the weakness wears off first, and then you feel a numb/tingly sensation. Finally, the pain may come back. This can happen in any order. If you had a shoulder block, you may have other symptoms such as:    · Mild shortness of breath  · A hoarse voice  · Blurry vision  · Unequal pupils  · Drooping of your face on the same side as the nerve block    These are common and expected side effects of this type of nerve block. Symptoms usually go away within 12 hours. If these symptoms do not go away, please call the Anesthesiology Department at [phone] and ask for Anesthesiologist on call. If you have severe or prolonged shortness of breath, please go to the nearest emergency room. If you continue to feel the effects of the nerve block for longer than 48 hours, please call the Anesthesiology Department at [phone]. Pain Medication  If needed, your surgeon will give you a prescription for pain medication. Nerve blocks sometimes wear off during the night. It is a good idea to take your pain medicine before going to sleep so you won't wake up with pain. The idea is to have pain medicine in your body before the nerve block wears off. To help prevent nausea, eat something before taking the pain medicine. Once a nerve block starts to wear off, it is usually completely gone within 60 minutes. It is important to have pain medicine in your system before the block wears off completely. Helpful Tips to Protect the Part of Your Body That Is Numb  After a nerve block, you cannot feel pain, pressure, or extremes in temperature. Because your arm or leg is numb, it is more at risk for injury. For example, if working near a hot oven, you could burn your arm or leg without knowing it. Cont'd  Helpful Tips to Protect the Part of Your Body That Is Numb    Here are some hints to help protect your limb while it is numb:    · While you are awake, try to change positions of your arm or leg often.  This will help you avoid putting too much pressure on the limb for long periods of time. · While sleeping, pad the blocked limb with pillows to avoid placing too much pressure on the limb. · If you have a cast or a tight dressing, check the color of your finger/toes every couple hours. Call your doctor if they look discolored. · If you had a shoulder, arm, or hand nerve block, you may go home with a sling. The sling will help to keep your arm in the ideal position. Wear the sling at all times until feeling returns. If you do not have a sling, watch the position of the blocked arm to make sure it is in a safe location. · If you had a leg or foot block, walk with crutches and assistance until the block wears off completely. Bearing weight on a partially numb leg may result in a fall and further injury. · Ask your family or support people to help with the above hints. CALL YOUR DOCTOR IF     Call your doctor if pain is NOT relieved by medication.   Excessive bleeding that does not stop after holding pressure over the area  · Temperature of 101 degrees F or above  · Excessive redness, swelling or bruising, and/ or green or yellow, smelly discharge from incision      TYPICAL SIDE EFFECTS OF PAIN MEDICATION:     Constipation: Drink lots of fluids. Over the counter stool softener if needed.    Nausea: Take pain medication with food. Call your doctor with persistent nausea. ACTIVITY  · As tolerated and as directed by your doctor. · Bathe or shower as directed by your doctor. DIET  · Day of surgery: Clear liquids until no nausea or vomiting; small portion, light diet [place] foods (ex: baked chicken, plain rice, grits, scrambled eggs, toast). Nothing greasy, fried or spicy today. · Advance to regular diet on second day, unless your doctor orders otherwise. · If nausea and vomiting continues, call your doctor. PAIN  · Take pain medication as directed by your doctor.     · DO NOT take aspirin or blood thinners unless directed by your doctor. AFTER ANESTHESIA   · For the first 24 hours: DO NOT Drive, Drink alcoholic beverages, or Make important decisions. · Be aware of dizziness following anesthesia and while taking pain medication. DISCHARGE SUMMARY from Nurse    PATIENT INSTRUCTIONS:    After general anesthesia or intravenous sedation, for 24 hours or while taking prescription Narcotics:  · Limit your activities  · Do not drive and operate hazardous machinery  · Do not make important personal or business decisions  · Do  not drink alcoholic beverages  · If you have not urinated within 8 hours after discharge, please contact your surgeon on call. *  Please give a list of your current medications to your Primary Care Provider. *  Please update this list whenever your medications are discontinued, doses are      changed, or new medications (including over-the-counter products) are added. *  Please carry medication information at all times in case of emergency situations. Preventing Infection at Home  We care about preventing infection and avoiding the spread of germs - not only when you are in the hospital but also when you return home. When you return home from the hospital, its important to take the following steps to help prevent infection and avoid spreading germs that could infect you and others. Ask everyone in your home to follow these guidelines, too. Clean Your Hands  · Clean your hands whenever your hands are visibly dirty, before you eat, before or after touching your mouth, nose or eyes, and before preparing food. Clean them after contact with body fluids, using the restroom, touching animals or changing diapers. · When washing hands, wet them with warm water and work up a lather. Rub hands for at least 15 seconds, then rinse them and pat them dry with a clean towel or paper towel. · When using hand sanitizers, it should take about 15 seconds to rub your hands dry.  If not, you probably didnt apply enough . Cover Your Sneeze or Cough  Germs are released into the air whenever you sneeze or cough. To prevent the spread of infection:  · Turn away from other people before coughing or sneezing. · Cover your mouth or nose with a tissue when you cough or sneeze. Put the tissue in the trash. · If you dont have a tissue, cough or sneeze into your upper sleeve, not your hands. · Always clean your hands after coughing or sneezing. Care for Wounds  Your skin is your bodys first line of defense against germs, but an open wound leaves an easy way for germs to enter your body. To prevent infection:  · Clean your hands before and after changing wound dressings, and wear gloves to change dressings if recommended by your doctor. · Take special care with IV lines or other devices inserted into the body. If you must touch them, clean your hands first.  · Follow any specific instructions from your doctor to care for your wounds. Contact your doctor if you experience any signs of infection, such as fever or increased redness at the surgical or wound site. Keep a Clean Home  · Clean or wipe commonly touched hard surfaces like door handles, sinks, tabletops, phones and TV remotes. · Use products labeled disinfectant to kill harmful bacteria and viruses. · Use a clean cloth or paper towel to clean and dry surfaces. Wiping surfaces with a dirty dishcloth, sponge or towel will only spread germs. · Never share toothbrushes, ahmadi, drinking glasses, utensils, razor blades, face cloths or bath towels to avoid spreading germs. · Be sure that the linens that you sleep on are clean. · Keep pets away from wounds and wash your hands after touching pets, their toys or bedding. We care about you and your health. Remember, preventing infections is a team effort between you, your family, friends and health care providers.        These are general instructions for a healthy lifestyle:    No smoking/ No tobacco products/ Avoid exposure to second hand smoke    Surgeon General's Warning:  Quitting smoking now greatly reduces serious risk to your health. Obesity, smoking, and sedentary lifestyle greatly increases your risk for illness    A healthy diet, regular physical exercise & weight monitoring are important for maintaining a healthy lifestyle    You may be retaining fluid if you have a history of heart failure or if you experience any of the following symptoms:  Weight gain of 3 pounds or more overnight or 5 pounds in a week, increased swelling in our hands or feet or shortness of breath while lying flat in bed. Please call your doctor as soon as you notice any of these symptoms; do not wait until your next office visit. Recognize signs and symptoms of STROKE:    F-face looks uneven    A-arms unable to move or move unevenly    S-speech slurred or non-existent    T-time-call 911 as soon as signs and symptoms begin-DO NOT go       Back to bed or wait to see if you get better-TIME IS BRAIN. Advance Care Planning  People with COVID-19 may have no symptoms, mild symptoms, such as fever, cough, and shortness of breath or they may have more severe illness, developing severe and fatal pneumonia. As a result, Advance Care Planning with attention to naming a health care decision maker (someone you trust to make healthcare decisions for you if you could not speak for yourself) and sharing other health care preferences is important BEFORE a possible health crisis. Please contact your Primary Care Provider to discuss Advance Care Planning.      Preventing the Spread of Coronavirus Disease 2019 in Homes and Residential Communities  For the most recent information go to [employer]    Prevention steps for People with confirmed or suspected COVID-19 (including persons under investigation) who do not need to be hospitalized  and   People with confirmed COVID-19 who were hospitalized and determined to be medically stable to go home    Your healthcare provider and public health staff will evaluate whether you can be cared for at home. If it is determined that you do not need to be hospitalized and can be isolated at home, you will be monitored by staff from your local or state health department. You should follow the prevention steps below until a healthcare provider or local or state health department says you can return to your normal activities. Stay home except to get medical care  People who are mildly ill with COVID-19 are able to isolate at home during their illness. You should restrict activities outside your home, except for getting medical care. Do not go to work, school, or public areas. Avoid using public transportation, ride-sharing, or taxis. Separate yourself from other people and animals in your home  People: As much as possible, you should stay in a specific room and away from other people in your home. Also, you should use a separate bathroom, if available. Animals: You should restrict contact with pets and other animals while you are sick with COVID-19, just like you would around other people. Although there have not been reports of pets or other animals becoming sick with COVID-19, it is still recommended that people sick with COVID-19 limit contact with animals until more information is known about the virus. When possible, have another member of your household care for your animals while you are sick. If you are sick with COVID-19, avoid contact with your pet, including petting, snuggling, being kissed or licked, and sharing food. If you must care for your pet or be around animals while you are sick, wash your hands before and after you interact with pets and wear a facemask. Call ahead before visiting your doctor  If you have a medical appointment, call the healthcare provider and tell them that you have or may have COVID-19.  This will help the healthcare providers office take steps to keep other people from getting infected or exposed. Wear a facemask  You should wear a facemask when you are around other people (e.g., sharing a room or vehicle) or pets and before you enter a healthcare providers office. If you are not able to wear a facemask (for example, because it causes trouble breathing), then people who live with you should not stay in the same room with you, or they should wear a facemask if they enter your room. Cover your coughs and sneezes  Cover your mouth and nose with a tissue when you cough or sneeze. Throw used tissues in a lined trash can. Immediately wash your hands with soap and water for at least 20 seconds or, if soap and water are not available, clean your hands with an alcohol-based hand  that contains at least 60% alcohol. Clean your hands often  Wash your hands often with soap and water for at least 20 seconds, especially after blowing your nose, coughing, or sneezing; going to the bathroom; and before eating or preparing food. If soap and water are not readily available, use an alcohol-based hand  with at least 60% alcohol, covering all surfaces of your hands and rubbing them together until they feel dry. Soap and water are the best option if hands are visibly dirty. Avoid touching your eyes, nose, and mouth with unwashed hands. Avoid sharing personal household items  You should not share dishes, drinking glasses, cups, eating utensils, towels, or bedding with other people or pets in your home. After using these items, they should be washed thoroughly with soap and water. Clean all high-touch surfaces everyday  High touch surfaces include counters, tabletops, doorknobs, bathroom fixtures, toilets, phones, keyboards, tablets, and bedside tables. Also, clean any surfaces that may have blood, stool, or body fluids on them. Use a household cleaning spray or wipe, according to the label instructions.  Labels contain instructions for safe and effective use of the cleaning product including precautions you should take when applying the product, such as wearing gloves and making sure you have good ventilation during use of the product. Monitor your symptoms  Seek prompt medical attention if your illness is worsening (e.g., difficulty breathing). Before seeking care, call your healthcare provider and tell them that you have, or are being evaluated for, COVID-19. Put on a facemask before you enter the facility. These steps will help the healthcare providers office to keep other people in the office or waiting room from getting infected or exposed. Ask your healthcare provider to call the local or state health department. Persons who are placed under active monitoring or facilitated self-monitoring should follow instructions provided by their local health department or occupational health professionals, as appropriate. When working with your local health department check their available hours. If you have a medical emergency and need to call 911, notify the dispatch personnel that you have, or are being evaluated for COVID-19. If possible, put on a facemask before emergency medical services arrive. Discontinuing home isolation  Patients with confirmed COVID-19 should remain under home isolation precautions until the risk of secondary transmission to others is thought to be low. The decision to discontinue home isolation precautions should be made on a case-by-case basis, in consultation with healthcare providers and [facility] and local health departments. Hydrocodone/Acetaminophen (Vicodin, Norco, Lortab) - (By mouth)   Why this medicine is used:   Treats pain.   Contact a nurse or doctor right away if you have:  · Blistering, peeling, red skin rash  · Fast or slow heartbeat, shallow breathing, blue lips, fingernails, or skin  · Anxiety, restlessness, muscle spasms, twitching, seeing or hearing things that are not there  · Dark urine or pale stools, yellow skin or eyes  · Extreme weakness, sweating, seizures, cold or clammy skin  · Lightheadedness, dizziness, fainting, fever, sweating     Common side effects:  · Constipation, nausea, vomiting, loss of appetite, stomach pain  · Tiredness or sleepiness  © 2017 [facility] Information is for End User's use only and may not be sold, redistributed or otherwise used for commercial purposes.

## 2020-10-09 NOTE — PERIOP NOTES
PACU DISCHARGE NOTE  Vital signs stable, pain well controlled, alert and oriented times three or at baseline, no anesthetic complications. IV removed with catheter tip intact. Written and verbal discharge instructions given, including pain control, dressing care and follow up appointment. Spouse, Kareem Jones, verbalized understanding and signed discharge instructions. All questions answered prior to discharge. Dr Michael Hurst okay to discharge at this time. Pt and all belongings taken via wheelchair and safely put in vehicle.

## 2020-10-09 NOTE — ANESTHESIA PREPROCEDURE EVALUATION
Relevant Problems   No relevant active problems       Anesthetic History   No history of anesthetic complications            Review of Systems / Medical History  Patient summary reviewed and pertinent labs reviewed    Pulmonary          Smoker (former)         Neuro/Psych   Within defined limits           Cardiovascular    Hypertension          Hyperlipidemia         GI/Hepatic/Renal  Within defined limits              Endo/Other        Arthritis     Other Findings              Physical Exam    Airway  Mallampati: II  TM Distance: 4 - 6 cm  Neck ROM: normal range of motion   Mouth opening: Normal     Cardiovascular  Regular rate and rhythm,  S1 and S2 normal,  no murmur, click, rub, or gallop             Dental      Comments: partials   Pulmonary  Breath sounds clear to auscultation               Abdominal  GI exam deferred       Other Findings            Anesthetic Plan    ASA: 2  Anesthesia type: general  Plan for LMA    Post-op pain plan if not by surgeon: peripheral nerve block single    Induction: Intravenous  Anesthetic plan and risks discussed with: Patient and Spouse

## 2020-10-10 ENCOUNTER — APPOINTMENT (OUTPATIENT)
Dept: CT IMAGING | Age: 70
DRG: 871 | End: 2020-10-10
Attending: EMERGENCY MEDICINE
Payer: MEDICARE

## 2020-10-10 ENCOUNTER — APPOINTMENT (OUTPATIENT)
Dept: GENERAL RADIOLOGY | Age: 70
DRG: 871 | End: 2020-10-10
Attending: HOSPITALIST
Payer: MEDICARE

## 2020-10-10 ENCOUNTER — HOSPITAL ENCOUNTER (OUTPATIENT)
Age: 70
Setting detail: OBSERVATION
Discharge: OTHER INSTITUTION | DRG: 871 | End: 2020-10-11
Attending: EMERGENCY MEDICINE | Admitting: FAMILY MEDICINE
Payer: MEDICARE

## 2020-10-10 ENCOUNTER — APPOINTMENT (OUTPATIENT)
Dept: GENERAL RADIOLOGY | Age: 70
DRG: 871 | End: 2020-10-10
Attending: EMERGENCY MEDICINE
Payer: MEDICARE

## 2020-10-10 DIAGNOSIS — I16.0 HYPERTENSIVE URGENCY: ICD-10-CM

## 2020-10-10 DIAGNOSIS — R77.8 ELEVATED TROPONIN I LEVEL: ICD-10-CM

## 2020-10-10 DIAGNOSIS — J18.9 COMMUNITY ACQUIRED PNEUMONIA OF RIGHT LOWER LOBE OF LUNG: ICD-10-CM

## 2020-10-10 DIAGNOSIS — I24.9 ACS (ACUTE CORONARY SYNDROME) (HCC): Primary | ICD-10-CM

## 2020-10-10 PROBLEM — D72.829 LEUKOCYTOSIS: Status: ACTIVE | Noted: 2020-10-10

## 2020-10-10 PROBLEM — R07.9 CHEST PAIN: Status: ACTIVE | Noted: 2020-10-10

## 2020-10-10 LAB
ALBUMIN SERPL-MCNC: 3.8 G/DL (ref 3.2–4.6)
ALBUMIN SERPL-MCNC: 3.8 G/DL (ref 3.2–4.6)
ALBUMIN/GLOB SERPL: 1.1 {RATIO} (ref 1.2–3.5)
ALBUMIN/GLOB SERPL: 1.2 {RATIO} (ref 1.2–3.5)
ALP SERPL-CCNC: 65 U/L (ref 50–136)
ALP SERPL-CCNC: 72 U/L (ref 50–136)
ALT SERPL-CCNC: 78 U/L (ref 12–65)
ALT SERPL-CCNC: 78 U/L (ref 12–65)
ANION GAP SERPL CALC-SCNC: 17 MMOL/L (ref 7–16)
ANION GAP SERPL CALC-SCNC: 6 MMOL/L (ref 7–16)
AST SERPL-CCNC: 49 U/L (ref 15–37)
AST SERPL-CCNC: 53 U/L (ref 15–37)
ATRIAL RATE: 69 BPM
ATRIAL RATE: 94 BPM
BASOPHILS # BLD: 0 K/UL (ref 0–0.2)
BASOPHILS NFR BLD: 0 % (ref 0–2)
BILIRUB SERPL-MCNC: 0.3 MG/DL (ref 0.2–1.1)
BILIRUB SERPL-MCNC: 0.3 MG/DL (ref 0.2–1.1)
BUN SERPL-MCNC: 30 MG/DL (ref 8–23)
BUN SERPL-MCNC: 35 MG/DL (ref 8–23)
CALCIUM SERPL-MCNC: 8.6 MG/DL (ref 8.3–10.4)
CALCIUM SERPL-MCNC: 8.6 MG/DL (ref 8.3–10.4)
CALCULATED P AXIS, ECG09: 45 DEGREES
CALCULATED P AXIS, ECG09: 70 DEGREES
CALCULATED R AXIS, ECG10: -31 DEGREES
CALCULATED R AXIS, ECG10: -47 DEGREES
CALCULATED T AXIS, ECG11: 24 DEGREES
CALCULATED T AXIS, ECG11: 59 DEGREES
CHLORIDE SERPL-SCNC: 107 MMOL/L (ref 98–107)
CHLORIDE SERPL-SCNC: 110 MMOL/L (ref 98–107)
CK SERPL-CCNC: 426 U/L (ref 21–215)
CO2 SERPL-SCNC: 15 MMOL/L (ref 21–32)
CO2 SERPL-SCNC: 24 MMOL/L (ref 21–32)
CREAT SERPL-MCNC: 1.37 MG/DL (ref 0.8–1.5)
CREAT SERPL-MCNC: 1.78 MG/DL (ref 0.8–1.5)
DIAGNOSIS, 93000: NORMAL
DIAGNOSIS, 93000: NORMAL
DIFFERENTIAL METHOD BLD: ABNORMAL
EOSINOPHIL # BLD: 0 K/UL (ref 0–0.8)
EOSINOPHIL NFR BLD: 0 % (ref 0.5–7.8)
ERYTHROCYTE [DISTWIDTH] IN BLOOD BY AUTOMATED COUNT: 13.2 % (ref 11.9–14.6)
ERYTHROCYTE [DISTWIDTH] IN BLOOD BY AUTOMATED COUNT: 13.4 % (ref 11.9–14.6)
GLOBULIN SER CALC-MCNC: 3.3 G/DL (ref 2.3–3.5)
GLOBULIN SER CALC-MCNC: 3.4 G/DL (ref 2.3–3.5)
GLUCOSE SERPL-MCNC: 154 MG/DL (ref 65–100)
GLUCOSE SERPL-MCNC: 158 MG/DL (ref 65–100)
HCT VFR BLD AUTO: 42.2 % (ref 41.1–50.3)
HCT VFR BLD AUTO: 42.3 % (ref 41.1–50.3)
HGB BLD-MCNC: 13.1 G/DL (ref 13.6–17.2)
HGB BLD-MCNC: 13.8 G/DL (ref 13.6–17.2)
IMM GRANULOCYTES # BLD AUTO: 0.1 K/UL (ref 0–0.5)
IMM GRANULOCYTES NFR BLD AUTO: 1 % (ref 0–5)
LACTATE SERPL-SCNC: 1.3 MMOL/L (ref 0.4–2)
LYMPHOCYTES # BLD: 0.6 K/UL (ref 0.5–4.6)
LYMPHOCYTES NFR BLD: 4 % (ref 13–44)
MCH RBC QN AUTO: 28.9 PG (ref 26.1–32.9)
MCH RBC QN AUTO: 29.2 PG (ref 26.1–32.9)
MCHC RBC AUTO-ENTMCNC: 31 G/DL (ref 31.4–35)
MCHC RBC AUTO-ENTMCNC: 32.6 G/DL (ref 31.4–35)
MCV RBC AUTO: 88.5 FL (ref 79.6–97.8)
MCV RBC AUTO: 94.2 FL (ref 79.6–97.8)
MONOCYTES # BLD: 0.3 K/UL (ref 0.1–1.3)
MONOCYTES NFR BLD: 2 % (ref 4–12)
NEUTS SEG # BLD: 12.9 K/UL (ref 1.7–8.2)
NEUTS SEG NFR BLD: 93 % (ref 43–78)
NRBC # BLD: 0 K/UL (ref 0–0.2)
NRBC # BLD: 0 K/UL (ref 0–0.2)
P-R INTERVAL, ECG05: 180 MS
P-R INTERVAL, ECG05: 186 MS
PLATELET # BLD AUTO: 225 K/UL (ref 150–450)
PLATELET # BLD AUTO: 279 K/UL (ref 150–450)
PMV BLD AUTO: 11.3 FL (ref 9.4–12.3)
PMV BLD AUTO: 11.8 FL (ref 9.4–12.3)
POTASSIUM SERPL-SCNC: 4.1 MMOL/L (ref 3.5–5.1)
POTASSIUM SERPL-SCNC: 5.1 MMOL/L (ref 3.5–5.1)
PROT SERPL-MCNC: 7.1 G/DL (ref 6.3–8.2)
PROT SERPL-MCNC: 7.2 G/DL (ref 6.3–8.2)
Q-T INTERVAL, ECG07: 404 MS
Q-T INTERVAL, ECG07: 464 MS
QRS DURATION, ECG06: 146 MS
QRS DURATION, ECG06: 150 MS
QTC CALCULATION (BEZET), ECG08: 497 MS
QTC CALCULATION (BEZET), ECG08: 505 MS
RBC # BLD AUTO: 4.48 M/UL (ref 4.23–5.6)
RBC # BLD AUTO: 4.78 M/UL (ref 4.23–5.6)
SODIUM SERPL-SCNC: 139 MMOL/L (ref 136–145)
SODIUM SERPL-SCNC: 140 MMOL/L (ref 136–145)
TROPONIN-HIGH SENSITIVITY: 131.5 PG/ML (ref 0–14)
TROPONIN-HIGH SENSITIVITY: 309.6 PG/ML (ref 0–14)
TROPONIN-HIGH SENSITIVITY: 3153.4 PG/ML (ref 0–14)
TROPONIN-HIGH SENSITIVITY: 35 PG/ML (ref 0–14)
TROPONIN-HIGH SENSITIVITY: 586.1 PG/ML (ref 0–14)
TROPONIN-HIGH SENSITIVITY: 616.3 PG/ML (ref 0–14)
VENTRICULAR RATE, ECG03: 69 BPM
VENTRICULAR RATE, ECG03: 94 BPM
WBC # BLD AUTO: 13.8 K/UL (ref 4.3–11.1)
WBC # BLD AUTO: 26.7 K/UL (ref 4.3–11.1)

## 2020-10-10 PROCEDURE — 74011250637 HC RX REV CODE- 250/637: Performed by: EMERGENCY MEDICINE

## 2020-10-10 PROCEDURE — 93005 ELECTROCARDIOGRAM TRACING: CPT | Performed by: EMERGENCY MEDICINE

## 2020-10-10 PROCEDURE — 74011250636 HC RX REV CODE- 250/636: Performed by: EMERGENCY MEDICINE

## 2020-10-10 PROCEDURE — 96372 THER/PROPH/DIAG INJ SC/IM: CPT

## 2020-10-10 PROCEDURE — 96375 TX/PRO/DX INJ NEW DRUG ADDON: CPT

## 2020-10-10 PROCEDURE — 85027 COMPLETE CBC AUTOMATED: CPT

## 2020-10-10 PROCEDURE — 74011636637 HC RX REV CODE- 636/637: Performed by: HOSPITALIST

## 2020-10-10 PROCEDURE — 96365 THER/PROPH/DIAG IV INF INIT: CPT

## 2020-10-10 PROCEDURE — 74011000250 HC RX REV CODE- 250: Performed by: HOSPITALIST

## 2020-10-10 PROCEDURE — 74011000258 HC RX REV CODE- 258: Performed by: HOSPITALIST

## 2020-10-10 PROCEDURE — 99285 EMERGENCY DEPT VISIT HI MDM: CPT

## 2020-10-10 PROCEDURE — 80053 COMPREHEN METABOLIC PANEL: CPT

## 2020-10-10 PROCEDURE — 74011250636 HC RX REV CODE- 250/636: Performed by: HOSPITALIST

## 2020-10-10 PROCEDURE — 74011250637 HC RX REV CODE- 250/637: Performed by: FAMILY MEDICINE

## 2020-10-10 PROCEDURE — 82550 ASSAY OF CK (CPK): CPT

## 2020-10-10 PROCEDURE — 83605 ASSAY OF LACTIC ACID: CPT

## 2020-10-10 PROCEDURE — 74011000250 HC RX REV CODE- 250: Performed by: EMERGENCY MEDICINE

## 2020-10-10 PROCEDURE — 71260 CT THORAX DX C+: CPT

## 2020-10-10 PROCEDURE — 74011250636 HC RX REV CODE- 250/636

## 2020-10-10 PROCEDURE — 74011000258 HC RX REV CODE- 258: Performed by: EMERGENCY MEDICINE

## 2020-10-10 PROCEDURE — 74011000636 HC RX REV CODE- 636: Performed by: EMERGENCY MEDICINE

## 2020-10-10 PROCEDURE — 73030 X-RAY EXAM OF SHOULDER: CPT

## 2020-10-10 PROCEDURE — 87040 BLOOD CULTURE FOR BACTERIA: CPT

## 2020-10-10 PROCEDURE — 74011250636 HC RX REV CODE- 250/636: Performed by: FAMILY MEDICINE

## 2020-10-10 PROCEDURE — 96376 TX/PRO/DX INJ SAME DRUG ADON: CPT

## 2020-10-10 PROCEDURE — 71045 X-RAY EXAM CHEST 1 VIEW: CPT

## 2020-10-10 PROCEDURE — 84484 ASSAY OF TROPONIN QUANT: CPT

## 2020-10-10 PROCEDURE — 99218 HC RM OBSERVATION: CPT

## 2020-10-10 PROCEDURE — 2709999900 HC NON-CHARGEABLE SUPPLY

## 2020-10-10 PROCEDURE — 85025 COMPLETE CBC W/AUTO DIFF WBC: CPT

## 2020-10-10 PROCEDURE — 77030019905 HC CATH URETH INTMIT MDII -A

## 2020-10-10 RX ORDER — HYDROMORPHONE HYDROCHLORIDE 1 MG/ML
0.5 INJECTION, SOLUTION INTRAMUSCULAR; INTRAVENOUS; SUBCUTANEOUS
Status: COMPLETED | OUTPATIENT
Start: 2020-10-10 | End: 2020-10-10

## 2020-10-10 RX ORDER — MORPHINE SULFATE 10 MG/ML
5 INJECTION, SOLUTION INTRAMUSCULAR; INTRAVENOUS
Status: COMPLETED | OUTPATIENT
Start: 2020-10-10 | End: 2020-10-10

## 2020-10-10 RX ORDER — HALOPERIDOL 5 MG/ML
4 INJECTION INTRAMUSCULAR
Status: DISCONTINUED | OUTPATIENT
Start: 2020-10-10 | End: 2020-10-10

## 2020-10-10 RX ORDER — METOPROLOL TARTRATE 5 MG/5ML
5 INJECTION INTRAVENOUS
Status: DISPENSED | OUTPATIENT
Start: 2020-10-10 | End: 2020-10-10

## 2020-10-10 RX ORDER — HALOPERIDOL 5 MG/ML
INJECTION INTRAMUSCULAR
Status: COMPLETED
Start: 2020-10-10 | End: 2020-10-10

## 2020-10-10 RX ORDER — NITROGLYCERIN 0.4 MG/1
0.4 TABLET SUBLINGUAL
Status: DISCONTINUED | OUTPATIENT
Start: 2020-10-10 | End: 2020-10-11 | Stop reason: HOSPADM

## 2020-10-10 RX ORDER — SODIUM CHLORIDE 0.9 % (FLUSH) 0.9 %
5-40 SYRINGE (ML) INJECTION EVERY 8 HOURS
Status: DISCONTINUED | OUTPATIENT
Start: 2020-10-10 | End: 2020-10-11 | Stop reason: HOSPADM

## 2020-10-10 RX ORDER — DEXTROSE 50 % IN WATER (D50W) INTRAVENOUS SYRINGE
25 ONCE
Status: COMPLETED | OUTPATIENT
Start: 2020-10-10 | End: 2020-10-10

## 2020-10-10 RX ORDER — ATORVASTATIN CALCIUM 40 MG/1
40 TABLET, FILM COATED ORAL
Status: DISCONTINUED | OUTPATIENT
Start: 2020-10-10 | End: 2020-10-11 | Stop reason: HOSPADM

## 2020-10-10 RX ORDER — GUAIFENESIN 100 MG/5ML
81 LIQUID (ML) ORAL DAILY
Status: DISCONTINUED | OUTPATIENT
Start: 2020-10-10 | End: 2020-10-11 | Stop reason: HOSPADM

## 2020-10-10 RX ORDER — METOPROLOL TARTRATE 5 MG/5ML
5 INJECTION INTRAVENOUS ONCE
Status: COMPLETED | OUTPATIENT
Start: 2020-10-10 | End: 2020-10-10

## 2020-10-10 RX ORDER — ZOLPIDEM TARTRATE 5 MG/1
5 TABLET ORAL
Status: DISCONTINUED | OUTPATIENT
Start: 2020-10-10 | End: 2020-10-11 | Stop reason: HOSPADM

## 2020-10-10 RX ORDER — SODIUM CHLORIDE 0.9 % (FLUSH) 0.9 %
5-40 SYRINGE (ML) INJECTION AS NEEDED
Status: DISCONTINUED | OUTPATIENT
Start: 2020-10-10 | End: 2020-10-11 | Stop reason: HOSPADM

## 2020-10-10 RX ORDER — NITROGLYCERIN 0.4 MG/1
0.4 TABLET SUBLINGUAL
Status: COMPLETED | OUTPATIENT
Start: 2020-10-10 | End: 2020-10-10

## 2020-10-10 RX ORDER — KETOROLAC TROMETHAMINE 30 MG/ML
15 INJECTION, SOLUTION INTRAMUSCULAR; INTRAVENOUS
Status: COMPLETED | OUTPATIENT
Start: 2020-10-10 | End: 2020-10-10

## 2020-10-10 RX ORDER — ONDANSETRON 2 MG/ML
4 INJECTION INTRAMUSCULAR; INTRAVENOUS
Status: COMPLETED | OUTPATIENT
Start: 2020-10-10 | End: 2020-10-10

## 2020-10-10 RX ORDER — HYDRALAZINE HYDROCHLORIDE 20 MG/ML
10 INJECTION INTRAMUSCULAR; INTRAVENOUS ONCE
Status: COMPLETED | OUTPATIENT
Start: 2020-10-10 | End: 2020-10-10

## 2020-10-10 RX ORDER — SODIUM CHLORIDE, SODIUM LACTATE, POTASSIUM CHLORIDE, CALCIUM CHLORIDE 600; 310; 30; 20 MG/100ML; MG/100ML; MG/100ML; MG/100ML
100 INJECTION, SOLUTION INTRAVENOUS CONTINUOUS
Status: DISCONTINUED | OUTPATIENT
Start: 2020-10-10 | End: 2020-10-11 | Stop reason: HOSPADM

## 2020-10-10 RX ORDER — LORAZEPAM 2 MG/ML
1 INJECTION INTRAMUSCULAR
Status: DISCONTINUED | OUTPATIENT
Start: 2020-10-10 | End: 2020-10-11 | Stop reason: HOSPADM

## 2020-10-10 RX ORDER — HYDROCHLOROTHIAZIDE 25 MG/1
12.5 TABLET ORAL DAILY
Status: DISCONTINUED | OUTPATIENT
Start: 2020-10-10 | End: 2020-10-11 | Stop reason: HOSPADM

## 2020-10-10 RX ORDER — MORPHINE SULFATE 2 MG/ML
2 INJECTION, SOLUTION INTRAMUSCULAR; INTRAVENOUS
Status: DISCONTINUED | OUTPATIENT
Start: 2020-10-10 | End: 2020-10-11 | Stop reason: HOSPADM

## 2020-10-10 RX ORDER — GUAIFENESIN 100 MG/5ML
324 LIQUID (ML) ORAL
Status: COMPLETED | OUTPATIENT
Start: 2020-10-10 | End: 2020-10-10

## 2020-10-10 RX ORDER — HALOPERIDOL 5 MG/ML
5 INJECTION INTRAMUSCULAR ONCE
Status: COMPLETED | OUTPATIENT
Start: 2020-10-10 | End: 2020-10-10

## 2020-10-10 RX ORDER — ENOXAPARIN SODIUM 100 MG/ML
40 INJECTION SUBCUTANEOUS EVERY 24 HOURS
Status: DISCONTINUED | OUTPATIENT
Start: 2020-10-10 | End: 2020-10-11 | Stop reason: HOSPADM

## 2020-10-10 RX ORDER — LORAZEPAM 2 MG/ML
1 INJECTION INTRAMUSCULAR ONCE
Status: COMPLETED | OUTPATIENT
Start: 2020-10-10 | End: 2020-10-10

## 2020-10-10 RX ORDER — SODIUM CHLORIDE 0.9 % (FLUSH) 0.9 %
10 SYRINGE (ML) INJECTION
Status: COMPLETED | OUTPATIENT
Start: 2020-10-10 | End: 2020-10-10

## 2020-10-10 RX ADMIN — NITROGLYCERIN 0.4 MG: 0.4 TABLET SUBLINGUAL at 00:44

## 2020-10-10 RX ADMIN — SODIUM CHLORIDE 100 ML: 900 INJECTION, SOLUTION INTRAVENOUS at 01:37

## 2020-10-10 RX ADMIN — CEFTRIAXONE SODIUM 2 G: 2 INJECTION, POWDER, FOR SOLUTION INTRAMUSCULAR; INTRAVENOUS at 03:54

## 2020-10-10 RX ADMIN — ASPIRIN 81 MG: 81 TABLET, CHEWABLE ORAL at 10:23

## 2020-10-10 RX ADMIN — DEXTROSE MONOHYDRATE 25 G: 25 INJECTION, SOLUTION INTRAVENOUS at 10:25

## 2020-10-10 RX ADMIN — MORPHINE SULFATE 2 MG: 2 INJECTION, SOLUTION INTRAMUSCULAR; INTRAVENOUS at 13:18

## 2020-10-10 RX ADMIN — AZITHROMYCIN 500 MG: 500 INJECTION, POWDER, LYOPHILIZED, FOR SOLUTION INTRAVENOUS at 05:20

## 2020-10-10 RX ADMIN — LORAZEPAM 1 MG: 2 INJECTION INTRAMUSCULAR; INTRAVENOUS at 10:20

## 2020-10-10 RX ADMIN — ONDANSETRON 4 MG: 2 INJECTION INTRAMUSCULAR; INTRAVENOUS at 01:09

## 2020-10-10 RX ADMIN — HYDROCHLOROTHIAZIDE 12.5 MG: 25 TABLET ORAL at 10:23

## 2020-10-10 RX ADMIN — PIPERACILLIN AND TAZOBACTAM 3.38 G: 3; .375 INJECTION, POWDER, LYOPHILIZED, FOR SOLUTION INTRAVENOUS at 10:30

## 2020-10-10 RX ADMIN — NITROGLYCERIN 1 INCH: 20 OINTMENT TOPICAL at 06:16

## 2020-10-10 RX ADMIN — IOPAMIDOL 100 ML: 755 INJECTION, SOLUTION INTRAVENOUS at 01:37

## 2020-10-10 RX ADMIN — INSULIN HUMAN 10 UNITS: 100 INJECTION, SOLUTION PARENTERAL at 10:34

## 2020-10-10 RX ADMIN — METOPROLOL TARTRATE 5 MG: 5 INJECTION INTRAVENOUS at 02:06

## 2020-10-10 RX ADMIN — SODIUM CHLORIDE 1000 ML: 900 INJECTION, SOLUTION INTRAVENOUS at 01:55

## 2020-10-10 RX ADMIN — MORPHINE SULFATE 5 MG: 10 INJECTION INTRAVENOUS at 02:05

## 2020-10-10 RX ADMIN — ENOXAPARIN SODIUM 40 MG: 40 INJECTION SUBCUTANEOUS at 06:17

## 2020-10-10 RX ADMIN — NITROGLYCERIN 1 INCH: 20 OINTMENT TOPICAL at 01:08

## 2020-10-10 RX ADMIN — SODIUM CHLORIDE, SODIUM LACTATE, POTASSIUM CHLORIDE, AND CALCIUM CHLORIDE 100 ML/HR: 600; 310; 30; 20 INJECTION, SOLUTION INTRAVENOUS at 21:29

## 2020-10-10 RX ADMIN — Medication 10 ML: at 06:00

## 2020-10-10 RX ADMIN — MORPHINE SULFATE 2 MG: 2 INJECTION, SOLUTION INTRAMUSCULAR; INTRAVENOUS at 05:13

## 2020-10-10 RX ADMIN — Medication 10 ML: at 01:37

## 2020-10-10 RX ADMIN — HALOPERIDOL LACTATE 4 MG: 5 INJECTION, SOLUTION INTRAMUSCULAR at 13:18

## 2020-10-10 RX ADMIN — METOPROLOL TARTRATE 5 MG: 5 INJECTION INTRAVENOUS at 01:29

## 2020-10-10 RX ADMIN — HYDROMORPHONE HYDROCHLORIDE 0.5 MG: 1 INJECTION, SOLUTION INTRAMUSCULAR; INTRAVENOUS; SUBCUTANEOUS at 01:09

## 2020-10-10 RX ADMIN — Medication 10 ML: at 22:32

## 2020-10-10 RX ADMIN — HALOPERIDOL LACTATE 5 MG: 5 INJECTION, SOLUTION INTRAMUSCULAR at 10:19

## 2020-10-10 RX ADMIN — LORAZEPAM 1 MG: 2 INJECTION INTRAMUSCULAR; INTRAVENOUS at 14:34

## 2020-10-10 RX ADMIN — LORAZEPAM 1 MG: 2 INJECTION INTRAMUSCULAR; INTRAVENOUS at 20:15

## 2020-10-10 RX ADMIN — HALOPERIDOL 5 MG: 5 INJECTION INTRAMUSCULAR at 10:19

## 2020-10-10 RX ADMIN — PIPERACILLIN AND TAZOBACTAM 3.38 G: 3; .375 INJECTION, POWDER, LYOPHILIZED, FOR SOLUTION INTRAVENOUS at 16:06

## 2020-10-10 RX ADMIN — HYDRALAZINE HYDROCHLORIDE 10 MG: 20 INJECTION INTRAMUSCULAR; INTRAVENOUS at 22:31

## 2020-10-10 RX ADMIN — NITROGLYCERIN 0.4 MG: 0.4 TABLET SUBLINGUAL at 00:35

## 2020-10-10 RX ADMIN — MORPHINE SULFATE 5 MG: 10 INJECTION INTRAVENOUS at 01:29

## 2020-10-10 RX ADMIN — LISINOPRIL 30 MG: 20 TABLET ORAL at 10:23

## 2020-10-10 RX ADMIN — SODIUM CHLORIDE, SODIUM LACTATE, POTASSIUM CHLORIDE, AND CALCIUM CHLORIDE 100 ML/HR: 600; 310; 30; 20 INJECTION, SOLUTION INTRAVENOUS at 12:00

## 2020-10-10 RX ADMIN — ASPIRIN 324 MG: 81 TABLET, CHEWABLE ORAL at 00:35

## 2020-10-10 RX ADMIN — NITROGLYCERIN 0.4 MG: 0.4 TABLET SUBLINGUAL at 00:53

## 2020-10-10 RX ADMIN — KETOROLAC TROMETHAMINE 15 MG: 30 INJECTION, SOLUTION INTRAMUSCULAR at 02:39

## 2020-10-10 RX ADMIN — Medication 10 ML: at 14:35

## 2020-10-10 NOTE — PROGRESS NOTES
TRANSFER - IN REPORT:    Verbal report received from LASHON Fountian(name) on Addy  being received from ED(unit) for routine progression of care      Report consisted of patients Situation, Background, Assessment and   Recommendations(SBAR). Information from the following report(s) SBAR, ED Summary and Cardiac Rhythm NSR was reviewed with the receiving nurse. Opportunity for questions and clarification was provided. Assessment completed upon patients arrival to unit and care assumed.

## 2020-10-10 NOTE — PROGRESS NOTES
Speech Pathology Note:    On call SLP available for bedside swallow evaluation. Per nursing and chart review, patient not appropriate at this time due to outbursts requiring restraints and medication. Nurse does report swallowing pills without difficulty prior to episode this date. Will follow for evaluation as indicated. Thank you,  Enid Browning MS, CCC-SLP

## 2020-10-10 NOTE — MANAGEMENT PLAN
Discussed case with Dr. Venkata Weinberg. Pt admitted with sepsis likely from PNA in ICU with mild troponin elevation. No active CP and ECG was stable without ischemia. For now, treat the infection. Pt also refused an echo yesterday. If/when he improves and cardiac ischemia remains a concern, an inpt vs outpt LHC can be considered. I would start with an echo at one point if he allows. Dakotah Rojas.  Warren Patel MD, [address] [facility] Cardiac Electrophysiology  [facility] Cardiology

## 2020-10-10 NOTE — PROGRESS NOTES
Took himself off the monitor and declared that he is not to be in the ICU. States the \"big fellow last night explained he was not in ICU\" Patient declines care and wants to see a doctor. Dr. Inse Narvaez informed.

## 2020-10-10 NOTE — PROGRESS NOTES
OT/PT Note: orders received. Patient up in room, wanting to leave, nurse supervisor in room. Patient not appropriate for therapy will  Discharge Orders. Nurse in agreement.  Bhavna dupree OTR/L

## 2020-10-10 NOTE — PROGRESS NOTES
Pt states he will stay if he can get up in a chair and talk to a dr. Roe Pean once again that because of the EKG changes and high troponin he risks causing more stress on his heart by getting up. Dr. Izabel Faye did say he could get up but with caution. He states he understands and wants to sit in the chair. Assisted pt to chair and gave him call light. Asked him to call for assistance due to nitroglycerin and pain medication he has been given. He states, \"and what if I don't call, what are you going to do, kick me out? \". Explained that we would not kick him out but he would put himself at risk for falling.

## 2020-10-10 NOTE — PROGRESS NOTES
Patient is 72years old male with hx of HTN, dyslipidemia, former smoker admitted on 10/10/20 for substernal chest pain radiating to left arm. Pt's EKG was unremarkable for acute ischemic changes but CT chest w contrast showed right posterior lobe infiltrate/consolidation suspicious of aspiration pneumonia. Cardiology has been consulted to evaluate the pt. Troponin in 2 sets 35 --> 131  LA 1.3, echo is pending. Potassium is 5.1, pt is lisinopril  Will give 1 amp D50 followed by 10 units IV regular insulin  Recheck BMP at 1200 hrs  Switch rocephin to Zosyn for concerns for aspiration to have anaerobic coverage  Speech eval consulted  Is very uncooperative this morning, has removed telemetry monitor and has pulled out IV access. Plan to give 1 dose of IM Haldol 5 mg  I discussed with patient's wife about ongoing medical issues. Patient third set of troponin is greater than 600. Twelve-lead EKGs and cardiac stress strips were then reviewed does not show any concerns for arrhythmia or ischemia. Will defer to cardiology whether patient will benefit from cardiac cath at this time. Pt not billed for this visit. Will continue to monitor.

## 2020-10-10 NOTE — H&P
HOSPITALIST INITIAL HISTORY AND PHYSICAL    NAME:  Qing Ghosh   Age:  72 y.o.  :   1950   MRN:   [id number]  PCP: Sherri King DO  Consulting MD:  Treatment Team: Attending Provider: Abraham Sun MD; Primary Nurse: Supa Renteria    CHIEF COMPLAINT: chest pain    HISTORY OF PRESENT ILLNESS:   Qing Ghosh is a 72 y.o. male with a past medical history of HTN, hypercholesterolemia who presents to the ER with report of substernal chest pain radiating to his left arm that started around 6 PM last night. Describes the pain as a tightness and admits to some shortness of breath. Reports that the pain lasted about 6 hours and has since resolved. Denies any pain currently, denies SOB, nausea, vomiting, lightheadedness or dizziness  . REVIEW OF SYSTEMS: Comprehensive ROS performed. Denies fevers, chills, nausea, vomiting, otherwise negative. Past Medical History:   Diagnosis Date    AION (acute ischemic optic neuropathy)     AION (acute ischemic optic neuropathy), bilateral     Former smoker, stopped smoking in distant past      quit    Hepatitis A     Hypercholesterolemia     Hypertension     Prostate cancer ([facility] [address]     Wears hearing aid in both ears         Past Surgical History:   Procedure Laterality Date    HX HEART CATHETERIZATION      no intervention, normal    HX PROSTATE SURGERY  2016    HX TONSIL AND ADENOIDECTOMY         Prior to Admission Medications   Prescriptions Last Dose Informant Patient Reported? Taking? HYDROcodone-acetaminophen (NORCO) 7.5-325 mg per tablet 10/10/2020 at Unknown time  Yes Yes   atorvastatin (LIPITOR) 40 mg tablet 10/10/2020 at Unknown time  No Yes   Sig: TAKE 1 TABLET BY MOUTH EVERY DAY AT NIGHT   Patient taking differently: Take 40 mg by mouth nightly. TAKE 1 TABLET BY MOUTH EVERY DAY AT NIGHT   hydroCHLOROthiazide (HYDRODIURIL) 12.5 mg tablet 10/10/2020 at Unknown time  Yes Yes   Sig: Take 1 Tab by mouth daily.    lidocaine (Lidoderm) 5 % Not Taking at Unknown time  No No   Sig: Apply patch to the affected area for 12 hours a day and remove for 12 hours a day. lisinopriL (PRINIVIL, ZESTRIL) 30 mg tablet 10/10/2020 at Unknown time  Yes Yes   Sig: Take 1 Tab by mouth daily. meloxicam (MOBIC) 15 mg tablet 10/3/2020 at Unknown time  Yes Yes   Sig: Hold for surgery-- states not taking   morphine IR (MS IR) 15 mg tablet 10/10/2020 at Unknown time  No Yes   Sig: Take 1 Tab by mouth every four (4) hours as needed for Pain for up to 3 days. Max Daily Amount: 90 mg.   naproxen (Naprosyn) 500 mg tablet 10/9/2020 at Unknown time  No Yes   Sig: Take 1 Tab by mouth two (2) times daily (with meals) for 10 days. Patient taking differently: Take 500 mg by mouth two (2) times daily (with meals). Hold for surgery- instructed 10/7/20  2200   omega-3 fatty acids/fish oil (FISH OIL OMEGA 3-6-9 PO) 10/10/2020 at Unknown time  Yes Yes   Sig: Take  by mouth.   zolpidem (AMBIEN) 10 mg tablet Not Taking at Unknown time  Yes No   Sig: Take 1 Tab by mouth nightly as needed. States not taking      Facility-Administered Medications: None       Allergies   Allergen Reactions    Hydrocodone Itching     Pt states incorrect/ allergy is to oxycodone. Pt states he can tolerate hydrocodone 10/8/20 Please remove    Codeine Other (comments)     convulsions    Oxycodone Itching     Can tolerate Hydrocodone       FAMILY HISTORY: Reviewed.  Negative except   Family History   Problem Relation Age of Onset    Diabetes Mother     Heart Disease Father     Cancer Sister     Heart Disease Sister        Social History     Tobacco Use    Smoking status: Former Smoker     Types: Cigarettes     Last attempt to quit:      Years since quittin.8    Smokeless tobacco: Never Used   Substance Use Topics    Alcohol use: No         Objective:     Visit Vitals  BP (!) 171/77   Pulse 67   Temp 97.9 °F (36.6 °C)   Resp 26   Ht 5' 9\" (1.753 m)   Wt 91.2 kg (201 lb)   SpO2 96% BMI 29.68 kg/m²      Temp (24hrs), Av.9 °F (36.6 °C), Min:97.9 °F (36.6 °C), Max:97.9 °F (36.6 °C)    Oxygen Therapy  O2 Sat (%): 96 % (10/10/20 0230)  Pulse via Oximetry: 67 beats per minute (10/10/20 0230)  O2 Device: Nasal cannula (10/10/20 0050)  O2 Flow Rate (L/min): 2 l/min (10/10/20 0050)  Physical Exam:  General:    The patient is a pleasant elderly male in no acute distress. Head:   Normocephalic/atraumatic. Eyes:  No palpebral pallor or scleral icterus. ENT:  External auricular and nasal exam within normal limits. Mucous membranes are moist.  Neck:  Supple, non-tender, no JVD. Lungs:   Clear to auscultation bilaterally without wheezes or crackles. No respiratory distress or accessory muscle use. Heart:   Regular rate and rhythm, without murmurs, rubs, or gallops. Abdomen:   Soft, non-tender, non-distended with normoactive bowel sounds. Genitourinary: No tenderness over the bladder or bilateral CVAs. Extremities: Without clubbing, cyanosis, or edema. Skin:     Normal color, texture, and turgor. No rashes, lesions, or jaundice. Pulses: Radial and dorsalis pedis pulses present 2+ bilaterally. Capillary refill <2s. Neurologic: CN II-XII grossly intact and symmetrical.     Moving all four extremities well with no focal deficits. Psychiatric: Pleasant demeanor, appropriate affect.  Alert and oriented x 3    Data Review:   Recent Results (from the past 24 hour(s))   POC SODIUM-POTASSIUM    Collection Time: 10/09/20  9:14 AM   Result Value Ref Range    Potassium, POC 4.2 3.5 - 5.1 MMOL/L   CBC WITH AUTOMATED DIFF    Collection Time: 10/10/20 12:33 AM   Result Value Ref Range    WBC 13.8 (H) 4.3 - 11.1 K/uL    RBC 4.78 4.23 - 5.6 M/uL    HGB 13.8 13.6 - 17.2 g/dL    HCT 42.3 41.1 - 50.3 %    MCV 88.5 79.6 - 97.8 FL    MCH 28.9 26.1 - 32.9 PG    MCHC 32.6 31.4 - 35.0 g/dL    RDW 13.2 11.9 - 14.6 %    PLATELET 225 404 - 459 K/uL    MPV 11.3 9.4 - 12.3 FL    ABSOLUTE NRBC 0.00 0.0 - 0.2 K/uL    DF AUTOMATED      NEUTROPHILS 93 (H) 43 - 78 %    LYMPHOCYTES 4 (L) 13 - 44 %    MONOCYTES 2 (L) 4.0 - 12.0 %    EOSINOPHILS 0 (L) 0.5 - 7.8 %    BASOPHILS 0 0.0 - 2.0 %    IMMATURE GRANULOCYTES 1 0.0 - 5.0 %    ABS. NEUTROPHILS 12.9 (H) 1.7 - 8.2 K/UL    ABS. LYMPHOCYTES 0.6 0.5 - 4.6 K/UL    ABS. MONOCYTES 0.3 0.1 - 1.3 K/UL    ABS. EOSINOPHILS 0.0 0.0 - 0.8 K/UL    ABS. BASOPHILS 0.0 0.0 - 0.2 K/UL    ABS. IMM. GRANS. 0.1 0.0 - 0.5 K/UL   METABOLIC PANEL, COMPREHENSIVE    Collection Time: 10/10/20 12:33 AM   Result Value Ref Range    Sodium 140 136 - 145 mmol/L    Potassium 5.1 3.5 - 5.1 mmol/L    Chloride 110 (H) 98 - 107 mmol/L    CO2 24 21 - 32 mmol/L    Anion gap 6 (L) 7 - 16 mmol/L    Glucose 154 (H) 65 - 100 mg/dL    BUN 30 (H) 8 - 23 MG/DL    Creatinine 1.37 0.8 - 1.5 MG/DL    GFR est AA >60 >60 ml/min/1.73m2    GFR est non-AA 55 (L) >60 ml/min/1.73m2    Calcium 8.6 8.3 - 10.4 MG/DL    Bilirubin, total 0.3 0.2 - 1.1 MG/DL    ALT (SGPT) 78 (H) 12 - 65 U/L    AST (SGOT) 53 (H) 15 - 37 U/L    Alk. phosphatase 72 50 - 136 U/L    Protein, total 7.2 6.3 - 8.2 g/dL    Albumin 3.8 3.2 - 4.6 g/dL    Globulin 3.4 2.3 - 3.5 g/dL    A-G Ratio 1.1 (L) 1.2 - 3.5     TROPONIN-HIGH SENSITIVITY    Collection Time: 10/10/20 12:33 AM   Result Value Ref Range    Troponin-High Sensitivity 35.0 (H) 0 - 14 pg/mL   TROPONIN-HIGH SENSITIVITY    Collection Time: 10/10/20  2:29 AM   Result Value Ref Range    Troponin-High Sensitivity 131.5 (HH) 0 - 14 pg/mL       Imaging /Procedures /Studies:  Ct Chest W Cont    Result Date: 10/10/2020  IMPRESSION: 1. No evidence of pulmonary embolism. 2. Consolidation in the posterior right lower lobe, likely aspiration or pneumonia. Negative for pulmonary edema or pleural effusion. 3. Cardiomegaly and coronary artery calcification. Xr Chest Port    Result Date: 10/10/2020  IMPRESSION: 1. Mild right lung base opacity, likely atelectasis or consolidation.          Assessment and Plan: Principal Problem:    Chest pain (41/89/7407)    Uncertain etiology. Cardiology recommended we observe overnight and trend troponins. Will monitor on telemetry, consult cardiology. ASA. Active Problems:    Elevated troponin (10/10/2020)    Trending. Cardiology to see. CAP (community acquired pneumonia) (10/10/2020)    IV Rocephin, Azithromycin. Asymptomatic. Leukocytosis (10/10/2020)    Follow CBC      Chronic renal insufficiency, stage III (moderate) (11/7/2017)    Follow BMP. Hypertension (4/18/2018)    Stable. Continue home meds. .      Mixed hyperlipidemia (4/4/2017)    Stable. Continue home meds. DVT Prophylaxis: Lovenox      Code Status: FULL CODE      Disposition: Observe on telemetry for evaluation and treatment as per above.       Anticipated discharge: < 2 midnights     Signed By: Trinity Mg MD     October 10, 2020

## 2020-10-10 NOTE — PROGRESS NOTES
Pt now in room 376. Pt placed on monitor. Pt in NSR with BBB occasional pauses. /83. See complete assessment as charted. Dual skin assessment with Hortensia Alicea RN. Only skin alteration visualized is R should post op site with dsg CDI. No pressure ulcer noted.
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
## 2020-10-10 NOTE — PROGRESS NOTES
Pt arrived to unit. Pt is alert, Oriented x 4. Admitted to unit. R shoulder dsg CDI. C/o severe pain at site. No swelling, redness, or drainage noted. Pulses strong bilaterally. Denies numbness/tingling. Moves R hand well. Lungs CTA. HR in 60's but monitor not working. Pt denies CP and nausea. Moving pt to another room with working monitor.

## 2020-10-10 NOTE — ED NOTES
TRANSFER - OUT REPORT:    Verbal report given to Edward on Jillian Monterroso  being transferred to [address] [place], [place] [address]etry for routine progression of care       Report consisted of patients Situation, Background, Assessment and   Recommendations(SBAR). Information from the following report(s) SBAR, Kardex, ED Summary, Procedure Summary, MAR, Accordion, Recent Results and Cardiac Rhythm NSR was reviewed with the receiving nurse. Lines:   Peripheral IV 10/10/20 Left Antecubital (Active)   Site Assessment Clean, dry, & intact 10/10/20 0031   Phlebitis Assessment 0 10/10/20 0031   Infiltration Assessment 0 10/10/20 0031   Dressing Status Clean, dry, & intact 10/10/20 0031   Hub Color/Line Status Green;Flushed 10/10/20 0031   Action Taken Blood drawn 10/10/20 0031        Opportunity for questions and clarification was provided.       Patient transported with:   Registered Nurse

## 2020-10-10 NOTE — PROGRESS NOTES
Phone conversation with JAMAICA Moeller  Informed of right shoulder xray and that the patient is in the ICU and the current situation

## 2020-10-10 NOTE — PROGRESS NOTES
Problem: Falls - Risk of  Goal: *Absence of Falls  Description: Document Hong Adair Fall Risk and appropriate interventions in the flowsheet.   10/10/2020 1145 by Melisa Quinn RN  Outcome: Progressing Towards Goal  Note: Fall Risk Interventions:            Medication Interventions: Patient to call before getting OOB    Elimination Interventions: Stay With Me (per policy)           97/51/7740 1145 by Melisa Quinn RN  Outcome: Progressing Towards Goal  Note: Fall Risk Interventions:            Medication Interventions: Patient to call before getting OOB    Elimination Interventions: Stay With Me (per policy)              Problem: Patient Education: Go to Patient Education Activity  Goal: Patient/Family Education  10/10/2020 1145 by Melisa Quinn RN  Outcome: Progressing Towards Goal  10/10/2020 1145 by Melisa Quinn RN  Outcome: Progressing Towards Goal     Problem: Violent Restraints  Goal: *Removal from restraints as soon as assessed to be safe  10/10/2020 1145 by Melisa Quinn RN  Outcome: Progressing Towards Goal  10/10/2020 1145 by Melisa Quinn RN  Outcome: Progressing Towards Goal  Goal: *No harm/injury to patient while restraints in use  10/10/2020 1145 by Melisa Quinn RN  Outcome: Progressing Towards Goal  10/10/2020 1145 by Melisa Quinn RN  Outcome: Progressing Towards Goal  Goal: *Patient's dignity will be maintained  10/10/2020 1145 by Melisa Quinn RN  Outcome: Progressing Towards Goal  10/10/2020 1145 by Melisa Quinn RN  Outcome: Progressing Towards Goal  Goal: *STG: Regains control of behavior  10/10/2020 1145 by Melisa Quinn RN  Outcome: Progressing Towards Goal  10/10/2020 1145 by Melisa Quinn RN  Outcome: Progressing Towards Goal  Goal: *STG: Patient uses nondestructive means to ventilate frustration  10/10/2020 1145 by Melisa Quinn RN  Outcome: Progressing Towards Goal  10/10/2020 1145 by Melisa Quinn RN  Outcome: Progressing Towards Goal  Goal: *STG: Patient tolerates environmental stimuli without aggressive behavior  10/10/2020 1145 by Geoff Willson RN  Outcome: Progressing Towards Goal  10/10/2020 1145 by Geoff Willson RN  Outcome: Progressing Towards Goal  Goal: *STG/LTG: Complies with medication therapy  10/10/2020 1145 by Geoff Willson RN  Outcome: Progressing Towards Goal  10/10/2020 1145 by Geoff Willson RN  Outcome: Progressing Towards Goal  Goal: *Patient Specific Goal (EDIT GOAL, INSERT TEXT)  10/10/2020 1145 by Geoff Willson RN  Outcome: Progressing Towards Goal  10/10/2020 1145 by Geoff Willson RN  Outcome: Progressing Towards Goal  Goal: Violent Restraints:Standard Intervention  10/10/2020 1145 by Geoff Willson RN  Outcome: Progressing Towards Goal  10/10/2020 1145 by Geoff Willson RN  Outcome: Progressing Towards Goal  Goal: Violent Restraints: Patient Interventions  10/10/2020 1145 by Geoff Willson RN  Outcome: Progressing Towards Goal  10/10/2020 1145 by Geoff Willson RN  Outcome: Progressing Towards Goal     Problem: Patient Education: Go to Patient Education Activity  Goal: Patient/Family Education  10/10/2020 1145 by Geoff Willson RN  Outcome: Progressing Towards Goal  10/10/2020 1145 by Geoff Willson RN  Outcome: Progressing Towards Goal

## 2020-10-10 NOTE — PROGRESS NOTES
Patient being uncooperative and argumentative with nursing staff. Patient states that he wishes to leave the hospital because he is not allowed to do as he wishes. Patient states that he feels there is a lack communication because he Kenney Barragan came here for my shoulder pain. \" and that \"no one explained all the rest of this was going to happen. \" Patient was informed by nursing staff for a second time the reason why he was admitted and why the medications ordered by the physician. Patient continues to state that he wants to leave and will call his wife to come pick him up. Physician has been notified of the situation.

## 2020-10-10 NOTE — ROUTINE PROCESS
Echo attempted. Patient currently refusing echo. Nurse notified. Will attempt again as schedule permits.

## 2020-10-10 NOTE — PROGRESS NOTES
Problem: Falls - Risk of  Goal: *Absence of Falls  Description: Document Sophia Mack Fall Risk and appropriate interventions in the flowsheet.   10/10/2020 1145 by David Patterson, RN  Outcome: Progressing Towards Goal  Note: Fall Risk Interventions:            Medication Interventions: Patient to call before getting OOB    Elimination Interventions: Stay With Me (per policy)           44/33/6078 1145 by David Patterson, RN  Outcome: Progressing Towards Goal  Note: Fall Risk Interventions:            Medication Interventions: Patient to call before getting OOB    Elimination Interventions: Stay With Me (per policy)              Problem: Patient Education: Go to Patient Education Activity  Goal: Patient/Family Education  10/10/2020 1145 by David Patterson, RN  Outcome: Progressing Towards Goal  10/10/2020 1145 by David Patterson, RN  Outcome: Progressing Towards Goal     Problem: Violent Restraints  Goal: *No harm/injury to patient while restraints in use  10/10/2020 1249 by David Patterson, RN  Outcome: Progressing Towards Goal  10/10/2020 1145 by David Patterson, RN  Outcome: Progressing Towards Goal  10/10/2020 1145 by David Patterson, RN  Outcome: Progressing Towards Goal  Goal: *Patient's dignity will be maintained  10/10/2020 1145 by David Patterson, RN  Outcome: Progressing Towards Goal  10/10/2020 1145 by David Patterson, RN  Outcome: Progressing Towards Goal  Goal: *STG: Regains control of behavior  10/10/2020 1145 by David Patterson, RN  Outcome: Progressing Towards Goal  10/10/2020 1145 by David Patterson, RN  Outcome: Progressing Towards Goal  Goal: *STG: Patient uses nondestructive means to ventilate frustration  10/10/2020 1145 by David Patterson, RN  Outcome: Progressing Towards Goal  10/10/2020 1145 by David Patterson, RN  Outcome: Progressing Towards Goal  Goal: *STG: Patient tolerates environmental stimuli without aggressive behavior  10/10/2020 1145 by David Patterson, RN  Outcome: Progressing Towards Goal  10/10/2020 1145 by Stormy Marin RN  Outcome: Progressing Towards Goal  Goal: *STG/LTG: Complies with medication therapy  10/10/2020 1145 by Stormy Marin RN  Outcome: Progressing Towards Goal  10/10/2020 1145 by Stormy Marin RN  Outcome: Progressing Towards Goal  Goal: *Patient Specific Goal (EDIT GOAL, INSERT TEXT)  10/10/2020 1145 by Stormy Marin RN  Outcome: Progressing Towards Goal  10/10/2020 1145 by Stormy Marin RN  Outcome: Progressing Towards Goal  Goal: Violent Restraints:Standard Intervention  10/10/2020 1145 by Stormy Marin RN  Outcome: Progressing Towards Goal  10/10/2020 1145 by Stormy Marin RN  Outcome: Progressing Towards Goal  Goal: Violent Restraints: Patient Interventions  10/10/2020 1145 by Stormy Marin RN  Outcome: Progressing Towards Goal  10/10/2020 1145 by Stormy Marin RN  Outcome: Progressing Towards Goal     Problem: Patient Education: Go to Patient Education Activity  Goal: Patient/Family Education  10/10/2020 1145 by Stormy Marin RN  Outcome: Progressing Towards Goal  10/10/2020 1145 by Stormy Marin RN  Outcome: Progressing Towards Goal

## 2020-10-10 NOTE — ED PROVIDER NOTES
Bairon Buckley is a 72 y.o. male seen on 10/10/2020 at 12:32 AM in the [address] [place] [address] in room ER04/04. CC: Chest pain    HPI: 26-year-old  male with history of hypercholesterolemia and hypertension presents the emergency department complaining of substernal chest discomfort radiating down the left arm starting approximately 530 or 6 PM this evening. Patient is status post right rotator cuff surgery this morning. Chest pain is described as a tightness, and a sensation that he cannot get a deep breath. Discomfort is worse with laying flat. The history is provided by the patient and the spouse. Chest Pain    This is a new problem. The current episode started 6 to 12 hours ago. The problem has been gradually worsening. Duration of episode(s) is 6 hours. The problem occurs constantly. The pain is associated with normal activity. The pain is present in the substernal region. The pain is at a severity of 9/10. The quality of the pain is described as pressure-like and burning. The pain radiates to the left arm. The symptoms are aggravated by certain positions. Associated symptoms include shortness of breath. Pertinent negatives include no abdominal pain, no back pain, no claudication, no cough, no diaphoresis, no dizziness, no exertional chest pressure, no fever, no headaches, no hemoptysis, no irregular heartbeat, no leg pain, no lower extremity edema, no malaise/fatigue, no nausea, no near-syncope, no numbness, no orthopnea, no palpitations, no PND, no sputum production, no vomiting and no weakness. He has tried rest for the symptoms. The treatment provided no relief. Risk factors include male gender, hypertension, dyslipidemia and recent surgery. His past medical history is significant for HTN. His past medical history does not include aneurysm, cancer, DM, DVT, PE or CHF.        REVIEW OF SYSTEMS     Review of Systems   Constitutional: Negative for diaphoresis, fever and malaise/fatigue. Respiratory: Positive for shortness of breath. Negative for cough, hemoptysis and sputum production. Cardiovascular: Positive for chest pain. Negative for palpitations, orthopnea, claudication, PND and near-syncope. Gastrointestinal: Negative for abdominal pain, nausea and vomiting. Musculoskeletal: Negative for back pain. Neurological: Negative for dizziness, weakness, numbness and headaches. All other systems reviewed and are negative. PAST MEDICAL HISTORY     Past Medical History:   Diagnosis Date    AION (acute ischemic optic neuropathy)     AION (acute ischemic optic neuropathy), bilateral     Former smoker, stopped smoking in distant past      quit    Hepatitis A     Hypercholesterolemia     Hypertension     Prostate cancer ([facility] [address]     Wears hearing aid in both ears      Past Surgical History:   Procedure Laterality Date    HX HEART CATHETERIZATION      no intervention, normal    HX PROSTATE SURGERY  2016    HX TONSIL AND ADENOIDECTOMY       Social History     Socioeconomic History    Marital status:      Spouse name: Not on file    Number of children: Not on file    Years of education: Not on file    Highest education level: Not on file   Tobacco Use    Smoking status: Former Smoker     Types: Cigarettes     Last attempt to quit:      Years since quittin.8    Smokeless tobacco: Never Used   Substance and Sexual Activity    Alcohol use: No    Drug use: No    Sexual activity: Yes     Partners: Female     Birth control/protection: None     Comment:      Prior to Admission Medications   Prescriptions Last Dose Informant Patient Reported? Taking?    HYDROcodone-acetaminophen (NORCO) 7.5-325 mg per tablet 10/10/2020 at Unknown time  Yes Yes   atorvastatin (LIPITOR) 40 mg tablet 10/10/2020 at Unknown time  No Yes   Sig: TAKE 1 TABLET BY MOUTH EVERY DAY AT NIGHT   Patient taking differently: Take 40 mg by mouth nightly. TAKE 1 TABLET BY MOUTH EVERY DAY AT NIGHT   hydroCHLOROthiazide (HYDRODIURIL) 12.5 mg tablet 10/10/2020 at Unknown time  Yes Yes   Sig: Take 1 Tab by mouth daily. lidocaine (Lidoderm) 5 % Not Taking at Unknown time  No No   Sig: Apply patch to the affected area for 12 hours a day and remove for 12 hours a day. lisinopriL (PRINIVIL, ZESTRIL) 30 mg tablet 10/10/2020 at Unknown time  Yes Yes   Sig: Take 1 Tab by mouth daily. meloxicam (MOBIC) 15 mg tablet 10/3/2020 at Unknown time  Yes Yes   Sig: Hold for surgery-- states not taking   morphine IR (MS IR) 15 mg tablet 10/10/2020 at Unknown time  No Yes   Sig: Take 1 Tab by mouth every four (4) hours as needed for Pain for up to 3 days. Max Daily Amount: 90 mg.   naproxen (Naprosyn) 500 mg tablet 10/9/2020 at Unknown time  No Yes   Sig: Take 1 Tab by mouth two (2) times daily (with meals) for 10 days. Patient taking differently: Take 500 mg by mouth two (2) times daily (with meals). Hold for surgery- instructed 10/7/20  2200   omega-3 fatty acids/fish oil (FISH OIL OMEGA 3-6-9 PO) 10/10/2020 at Unknown time  Yes Yes   Sig: Take  by mouth.   zolpidem (AMBIEN) 10 mg tablet Not Taking at Unknown time  Yes No   Sig: Take 1 Tab by mouth nightly as needed. States not taking      Facility-Administered Medications: None     Allergies   Allergen Reactions    Hydrocodone Itching     Pt states incorrect/ allergy is to oxycodone. Pt states he can tolerate hydrocodone 10/8/20 Please remove    Codeine Other (comments)     convulsions    Oxycodone Itching     Can tolerate Hydrocodone        PHYSICAL EXAM       Vitals:    10/10/20 0023 10/10/20 0032   BP: (!) 230/109    Pulse: 90    Resp: 16    Temp: 97.9 °F (36.6 °C)    SpO2: 94% 94%    Vital signs were reviewed. Physical Exam  Vitals signs and nursing note reviewed. Constitutional:       General: He is in acute distress. Appearance: He is well-developed.    HENT:      Head: Normocephalic and atraumatic. Right Ear: External ear normal.      Left Ear: External ear normal.      Mouth/Throat:      Mouth: Mucous membranes are moist.   Eyes:      Extraocular Movements: Extraocular movements intact. Conjunctiva/sclera: Conjunctivae normal.      Pupils: Pupils are equal, round, and reactive to light. Neck:      Musculoskeletal: Normal range of motion and neck supple. Cardiovascular:      Rate and Rhythm: Normal rate and regular rhythm. Pulses: Normal pulses. Heart sounds: Normal heart sounds. No murmur. Pulmonary:      Effort: Pulmonary effort is normal.      Breath sounds: Normal breath sounds. No wheezing, rhonchi or rales. Chest:      Chest wall: No tenderness. Abdominal:      General: Bowel sounds are normal.      Palpations: Abdomen is soft. Tenderness: There is no abdominal tenderness. There is no right CVA tenderness, left CVA tenderness, guarding or rebound. Hernia: No hernia is present. Musculoskeletal: Normal range of motion. Comments: Right arm and is in a sling. Skin:     General: Skin is warm and dry. Capillary Refill: Capillary refill takes less than 2 seconds. Neurological:      General: No focal deficit present. Mental Status: He is alert and oriented to person, place, and time.    Psychiatric:         Mood and Affect: Mood normal.         Behavior: Behavior normal.          MEDICAL DECISION MAKING     MDM  Number of Diagnoses or Management Options  ACS (acute coronary syndrome) ([facility] [address]): new, needed workup  Community acquired pneumonia of right lower lobe of lung: new, needed workup  Elevated troponin I level: new, needed workup  Hypertensive urgency: new, needed workup     Amount and/or Complexity of Data Reviewed  Clinical lab tests: ordered and reviewed  Tests in the radiology section of CPT®: ordered and reviewed  Review and summarize past medical records: yes  Discuss the patient with other providers: yes  Independent visualization of images, tracings, or specimens: yes    Risk of Complications, Morbidity, and/or Mortality  Presenting problems: high  Diagnostic procedures: high  Management options: high    Patient Progress  Patient progress: improved    Procedures    ED Course:    Patient's chest discomfort much improved with nitroglycerin x3. Complains of increasing right shoulder pain secondary to surgery. We will treat with Dilaudid and Zofran. Patient was still a systolic blood pressure of 190, will add nitroglycerin to the chest wall. 0115: Case discussed with Dr. Cornelius Holguin of cardiology. Recommends second troponin, better control of blood pressure, and CT chest rule out PE.   0154: Patient still complains of right shoulder pain. The bolster for his shoulder is still in the car, and the patient is not in his sling. Wife was instructed go to the car for the support, and the patient be placed back in a sling and given ice to the shoulder. Findings and CT scan discussed with cardiology as well as the other findings to include pneumonia and elevated troponin. Recommends hospitalist admit with consultation. Case discussed with hospitalist will admit. Disposition: Admission  Diagnosis: Acute coronary syndrome with elevated troponin, right lower lobe pneumonia, hypertensive urgency    CRITICAL CARE Documentation: This patient is critically ill and there is a high probability of of imminent or life threatening deterioration in the patient's condition without immediate management. The nature of the patient's clinical problem is: Acute coronary syndrome, elevated troponin, right lower lobe pneumonia, hypertensive urgency    I have spent 1.5 hour in direct patient care, documentation, review of labs/xrays/old records, discussion with Family, Staff, Colleague, Nursing . The time involved in the performance of separately reportable procedures was not counted toward critical care time.      Leticia Goodrich MD; 10/10/2020 @3:16 AM       ____________________________________________________________________  A portion of this note was generated using voice recognition dictation software. While the note has been reviewed for accuracy, please note certain words and phrases may not be transcribed as intended and some grammatical and/or typographical errors may be present.

## 2020-10-10 NOTE — ED TRIAGE NOTES
Chest pain that radiates to left arm for \"a couple of hours. \" Pt had left rotator cuff surgery today. Pt masked in triage.

## 2020-10-10 NOTE — PROGRESS NOTES
Spoke to patient and wife. Patient continuing to refuse care. Patient alert and oriented to person, place, and time, but is disoriented to situation. When asked about why he is in the hospital, states he is here for treatment of right shoulder pain. Explained that he is in the hospital due to cardiac enzymes being elevated. Wife encouraged patient to stay and receive treatment. Pt continues to argue that he is only here for his shoulder pain and nothing else is wrong.

## 2020-10-11 ENCOUNTER — HOSPITAL ENCOUNTER (INPATIENT)
Age: 70
LOS: 5 days | Discharge: HOME | DRG: 871 | End: 2020-10-16
Attending: INTERNAL MEDICINE | Admitting: INTERNAL MEDICINE
Payer: MEDICARE

## 2020-10-11 VITALS
DIASTOLIC BLOOD PRESSURE: 80 MMHG | HEIGHT: 69 IN | WEIGHT: 212.74 LBS | TEMPERATURE: 98.3 F | HEART RATE: 113 BPM | SYSTOLIC BLOOD PRESSURE: 185 MMHG | OXYGEN SATURATION: 95 % | RESPIRATION RATE: 29 BRPM | BODY MASS INDEX: 31.51 KG/M2

## 2020-10-11 DIAGNOSIS — J69.0 ASPIRATION PNEUMONIA, UNSPECIFIED ASPIRATION PNEUMONIA TYPE, UNSPECIFIED LATERALITY, UNSPECIFIED PART OF LUNG (HCC): ICD-10-CM

## 2020-10-11 DIAGNOSIS — I25.9 CHEST PAIN DUE TO MYOCARDIAL ISCHEMIA, UNSPECIFIED ISCHEMIC CHEST PAIN TYPE: ICD-10-CM

## 2020-10-11 DIAGNOSIS — N17.9 ACUTE KIDNEY INJURY (HCC): ICD-10-CM

## 2020-10-11 DIAGNOSIS — R07.89 OTHER CHEST PAIN: ICD-10-CM

## 2020-10-11 DIAGNOSIS — R41.0 ACUTE DELIRIUM: ICD-10-CM

## 2020-10-11 DIAGNOSIS — I16.0 HYPERTENSIVE URGENCY, MALIGNANT: ICD-10-CM

## 2020-10-11 DIAGNOSIS — R77.8 ELEVATED TROPONIN: ICD-10-CM

## 2020-10-11 PROBLEM — A41.9 SEPSIS (HCC): Status: ACTIVE | Noted: 2020-10-11

## 2020-10-11 LAB
ALBUMIN SERPL-MCNC: 3.4 G/DL (ref 3.2–4.6)
ALBUMIN/GLOB SERPL: 1.2 {RATIO} (ref 1.2–3.5)
ALP SERPL-CCNC: 61 U/L (ref 50–136)
ALT SERPL-CCNC: 62 U/L (ref 12–65)
ANION GAP SERPL CALC-SCNC: 9 MMOL/L (ref 7–16)
APTT PPP: 26.1 SEC (ref 24.3–35.4)
APTT PPP: 65 SEC (ref 24.3–35.4)
ARTERIAL PATENCY WRIST A: YES
AST SERPL-CCNC: 76 U/L (ref 15–37)
ATRIAL RATE: 105 BPM
BASE EXCESS BLD CALC-SCNC: 0 MMOL/L
BASOPHILS # BLD: 0.1 K/UL (ref 0–0.2)
BASOPHILS # BLD: 0.1 K/UL (ref 0–0.2)
BASOPHILS NFR BLD: 0 % (ref 0–2)
BASOPHILS NFR BLD: 1 % (ref 0–2)
BDY SITE: ABNORMAL
BILIRUB SERPL-MCNC: 0.7 MG/DL (ref 0.2–1.1)
BUN SERPL-MCNC: 24 MG/DL (ref 8–23)
CALCIUM SERPL-MCNC: 8.5 MG/DL (ref 8.3–10.4)
CALCULATED P AXIS, ECG09: 68 DEGREES
CALCULATED R AXIS, ECG10: -41 DEGREES
CALCULATED T AXIS, ECG11: 65 DEGREES
CHLORIDE SERPL-SCNC: 108 MMOL/L (ref 98–107)
CHOLEST SERPL-MCNC: 137 MG/DL
CO2 BLD-SCNC: 22 MMOL/L
CO2 SERPL-SCNC: 22 MMOL/L (ref 21–32)
COLLECT TIME,HTIME: 1615
CREAT SERPL-MCNC: 1.2 MG/DL (ref 0.8–1.5)
DIAGNOSIS, 93000: NORMAL
DIFFERENTIAL METHOD BLD: ABNORMAL
DIFFERENTIAL METHOD BLD: ABNORMAL
EOSINOPHIL # BLD: 0.2 K/UL (ref 0–0.8)
EOSINOPHIL # BLD: 0.2 K/UL (ref 0–0.8)
EOSINOPHIL NFR BLD: 2 % (ref 0.5–7.8)
EOSINOPHIL NFR BLD: 2 % (ref 0.5–7.8)
ERYTHROCYTE [DISTWIDTH] IN BLOOD BY AUTOMATED COUNT: 13.3 % (ref 11.9–14.6)
ERYTHROCYTE [DISTWIDTH] IN BLOOD BY AUTOMATED COUNT: 13.5 % (ref 11.9–14.6)
FLOW RATE ISTAT,IFRATE: 0.21 L/MIN
GAS FLOW.O2 O2 DELIVERY SYS: ABNORMAL L/MIN
GLOBULIN SER CALC-MCNC: 2.8 G/DL (ref 2.3–3.5)
GLUCOSE BLD STRIP.AUTO-MCNC: 83 MG/DL (ref 65–100)
GLUCOSE SERPL-MCNC: 92 MG/DL (ref 65–100)
HCO3 BLD-SCNC: 21.7 MMOL/L (ref 22–26)
HCT VFR BLD AUTO: 36.9 % (ref 41.1–50.3)
HCT VFR BLD AUTO: 37 % (ref 41.1–50.3)
HDLC SERPL-MCNC: 39 MG/DL (ref 40–60)
HDLC SERPL: 3.5 {RATIO}
HGB BLD-MCNC: 12.3 G/DL (ref 13.6–17.2)
HGB BLD-MCNC: 12.7 G/DL (ref 13.6–17.2)
IMM GRANULOCYTES # BLD AUTO: 0.1 K/UL (ref 0–0.5)
IMM GRANULOCYTES # BLD AUTO: 0.1 K/UL (ref 0–0.5)
IMM GRANULOCYTES NFR BLD AUTO: 1 % (ref 0–5)
IMM GRANULOCYTES NFR BLD AUTO: 1 % (ref 0–5)
LDLC SERPL CALC-MCNC: 69.6 MG/DL
LIPID PROFILE,FLP: ABNORMAL
LYMPHOCYTES # BLD: 1.6 K/UL (ref 0.5–4.6)
LYMPHOCYTES # BLD: 1.7 K/UL (ref 0.5–4.6)
LYMPHOCYTES NFR BLD: 12 % (ref 13–44)
LYMPHOCYTES NFR BLD: 14 % (ref 13–44)
MCH RBC QN AUTO: 28.8 PG (ref 26.1–32.9)
MCH RBC QN AUTO: 29.5 PG (ref 26.1–32.9)
MCHC RBC AUTO-ENTMCNC: 33.3 G/DL (ref 31.4–35)
MCHC RBC AUTO-ENTMCNC: 34.3 G/DL (ref 31.4–35)
MCV RBC AUTO: 85.8 FL (ref 79.6–97.8)
MCV RBC AUTO: 86.4 FL (ref 79.6–97.8)
MONOCYTES # BLD: 1.3 K/UL (ref 0.1–1.3)
MONOCYTES # BLD: 1.5 K/UL (ref 0.1–1.3)
MONOCYTES NFR BLD: 11 % (ref 4–12)
MONOCYTES NFR BLD: 12 % (ref 4–12)
NEUTS SEG # BLD: 10.9 K/UL (ref 1.7–8.2)
NEUTS SEG # BLD: 8 K/UL (ref 1.7–8.2)
NEUTS SEG NFR BLD: 71 % (ref 43–78)
NEUTS SEG NFR BLD: 75 % (ref 43–78)
NRBC # BLD: 0 K/UL (ref 0–0.2)
NRBC # BLD: 0 K/UL (ref 0–0.2)
P-R INTERVAL, ECG05: 172 MS
PCO2 BLD: 26.8 MMHG (ref 35–45)
PH BLD: 7.52 [PH] (ref 7.35–7.45)
PLATELET # BLD AUTO: 193 K/UL (ref 150–450)
PLATELET # BLD AUTO: 206 K/UL (ref 150–450)
PMV BLD AUTO: 10.8 FL (ref 9.4–12.3)
PMV BLD AUTO: 10.9 FL (ref 9.4–12.3)
PO2 BLD: 31 MMHG (ref 75–100)
POTASSIUM SERPL-SCNC: 3.6 MMOL/L (ref 3.5–5.1)
PROT SERPL-MCNC: 6.2 G/DL (ref 6.3–8.2)
Q-T INTERVAL, ECG07: 384 MS
QRS DURATION, ECG06: 136 MS
QTC CALCULATION (BEZET), ECG08: 507 MS
RBC # BLD AUTO: 4.27 M/UL (ref 4.23–5.6)
RBC # BLD AUTO: 4.31 M/UL (ref 4.23–5.6)
SAO2 % BLD: 69 % (ref 95–98)
SERVICE CMNT-IMP: ABNORMAL
SODIUM SERPL-SCNC: 139 MMOL/L (ref 136–145)
SPECIMEN TYPE: ABNORMAL
TRIGL SERPL-MCNC: 142 MG/DL (ref 35–150)
TROPONIN-HIGH SENSITIVITY: 5116.9 PG/ML (ref 0–14)
TROPONIN-HIGH SENSITIVITY: ABNORMAL PG/ML (ref 0–14)
TROPONIN-HIGH SENSITIVITY: ABNORMAL PG/ML (ref 0–14)
VENTRICULAR RATE, ECG03: 105 BPM
VLDLC SERPL CALC-MCNC: 28.4 MG/DL (ref 6–23)
WBC # BLD AUTO: 11.3 K/UL (ref 4.3–11.1)
WBC # BLD AUTO: 14.5 K/UL (ref 4.3–11.1)

## 2020-10-11 PROCEDURE — 82962 GLUCOSE BLOOD TEST: CPT

## 2020-10-11 PROCEDURE — 82803 BLOOD GASES ANY COMBINATION: CPT

## 2020-10-11 PROCEDURE — 74011250637 HC RX REV CODE- 250/637: Performed by: INTERNAL MEDICINE

## 2020-10-11 PROCEDURE — 80061 LIPID PANEL: CPT

## 2020-10-11 PROCEDURE — 65660000000 HC RM CCU STEPDOWN

## 2020-10-11 PROCEDURE — 74011250636 HC RX REV CODE- 250/636: Performed by: HOSPITALIST

## 2020-10-11 PROCEDURE — C8924 2D TTE W OR W/O FOL W/CON,FU: HCPCS

## 2020-10-11 PROCEDURE — 2709999900 HC NON-CHARGEABLE SUPPLY

## 2020-10-11 PROCEDURE — 74011250636 HC RX REV CODE- 250/636: Performed by: FAMILY MEDICINE

## 2020-10-11 PROCEDURE — 74011250637 HC RX REV CODE- 250/637: Performed by: HOSPITALIST

## 2020-10-11 PROCEDURE — 96376 TX/PRO/DX INJ SAME DRUG ADON: CPT

## 2020-10-11 PROCEDURE — 96365 THER/PROPH/DIAG IV INF INIT: CPT

## 2020-10-11 PROCEDURE — 85025 COMPLETE CBC W/AUTO DIFF WBC: CPT

## 2020-10-11 PROCEDURE — 74011000258 HC RX REV CODE- 258: Performed by: HOSPITALIST

## 2020-10-11 PROCEDURE — 74011250636 HC RX REV CODE- 250/636

## 2020-10-11 PROCEDURE — 85730 THROMBOPLASTIN TIME PARTIAL: CPT

## 2020-10-11 PROCEDURE — 94760 N-INVAS EAR/PLS OXIMETRY 1: CPT

## 2020-10-11 PROCEDURE — 96372 THER/PROPH/DIAG INJ SC/IM: CPT

## 2020-10-11 PROCEDURE — 84484 ASSAY OF TROPONIN QUANT: CPT

## 2020-10-11 PROCEDURE — 99222 1ST HOSP IP/OBS MODERATE 55: CPT | Performed by: INTERNAL MEDICINE

## 2020-10-11 PROCEDURE — 74011250637 HC RX REV CODE- 250/637: Performed by: FAMILY MEDICINE

## 2020-10-11 PROCEDURE — 36600 WITHDRAWAL OF ARTERIAL BLOOD: CPT

## 2020-10-11 PROCEDURE — 80053 COMPREHEN METABOLIC PANEL: CPT

## 2020-10-11 PROCEDURE — 99218 HC RM OBSERVATION: CPT

## 2020-10-11 PROCEDURE — 74011250636 HC RX REV CODE- 250/636: Performed by: INTERNAL MEDICINE

## 2020-10-11 PROCEDURE — 93005 ELECTROCARDIOGRAM TRACING: CPT | Performed by: INTERNAL MEDICINE

## 2020-10-11 PROCEDURE — 77010033678 HC OXYGEN DAILY

## 2020-10-11 PROCEDURE — 36415 COLL VENOUS BLD VENIPUNCTURE: CPT

## 2020-10-11 PROCEDURE — 96366 THER/PROPH/DIAG IV INF ADDON: CPT

## 2020-10-11 PROCEDURE — 74011000250 HC RX REV CODE- 250: Performed by: INTERNAL MEDICINE

## 2020-10-11 PROCEDURE — 96375 TX/PRO/DX INJ NEW DRUG ADDON: CPT

## 2020-10-11 RX ORDER — ENOXAPARIN SODIUM 100 MG/ML
40 INJECTION SUBCUTANEOUS EVERY 24 HOURS
Status: CANCELLED | OUTPATIENT
Start: 2020-10-12

## 2020-10-11 RX ORDER — MORPHINE SULFATE 2 MG/ML
2 INJECTION, SOLUTION INTRAMUSCULAR; INTRAVENOUS
Status: CANCELLED | OUTPATIENT
Start: 2020-10-11

## 2020-10-11 RX ORDER — GUAIFENESIN 100 MG/5ML
81 LIQUID (ML) ORAL DAILY
Status: CANCELLED | OUTPATIENT
Start: 2020-10-11

## 2020-10-11 RX ORDER — SODIUM CHLORIDE 0.9 % (FLUSH) 0.9 %
5-40 SYRINGE (ML) INJECTION AS NEEDED
Status: CANCELLED | OUTPATIENT
Start: 2020-10-11

## 2020-10-11 RX ORDER — LORAZEPAM 2 MG/ML
2 INJECTION INTRAMUSCULAR ONCE
Status: DISCONTINUED | OUTPATIENT
Start: 2020-10-11 | End: 2020-10-11

## 2020-10-11 RX ORDER — ENOXAPARIN SODIUM 100 MG/ML
40 INJECTION SUBCUTANEOUS EVERY 24 HOURS
Status: DISCONTINUED | OUTPATIENT
Start: 2020-10-12 | End: 2020-10-11

## 2020-10-11 RX ORDER — SODIUM CHLORIDE, SODIUM LACTATE, POTASSIUM CHLORIDE, CALCIUM CHLORIDE 600; 310; 30; 20 MG/100ML; MG/100ML; MG/100ML; MG/100ML
100 INJECTION, SOLUTION INTRAVENOUS CONTINUOUS
Status: CANCELLED | OUTPATIENT
Start: 2020-10-11 | End: 2020-10-11

## 2020-10-11 RX ORDER — HALOPERIDOL 5 MG/ML
INJECTION INTRAMUSCULAR
Status: COMPLETED
Start: 2020-10-11 | End: 2020-10-11

## 2020-10-11 RX ORDER — NITROGLYCERIN 0.4 MG/1
0.4 TABLET SUBLINGUAL
Status: DISCONTINUED | OUTPATIENT
Start: 2020-10-11 | End: 2020-10-16 | Stop reason: HOSPADM

## 2020-10-11 RX ORDER — SODIUM CHLORIDE 0.9 % (FLUSH) 0.9 %
5-40 SYRINGE (ML) INJECTION AS NEEDED
Status: DISCONTINUED | OUTPATIENT
Start: 2020-10-11 | End: 2020-10-16 | Stop reason: HOSPADM

## 2020-10-11 RX ORDER — ACETAMINOPHEN 650 MG/1
650 SUPPOSITORY RECTAL
Status: DISCONTINUED | OUTPATIENT
Start: 2020-10-11 | End: 2020-10-16 | Stop reason: HOSPADM

## 2020-10-11 RX ORDER — ATORVASTATIN CALCIUM 40 MG/1
40 TABLET, FILM COATED ORAL
Status: DISCONTINUED | OUTPATIENT
Start: 2020-10-11 | End: 2020-10-15

## 2020-10-11 RX ORDER — LORAZEPAM 2 MG/ML
1 INJECTION INTRAMUSCULAR ONCE
Status: COMPLETED | OUTPATIENT
Start: 2020-10-11 | End: 2020-10-11

## 2020-10-11 RX ORDER — NITROGLYCERIN 0.4 MG/1
0.4 TABLET SUBLINGUAL
Status: CANCELLED | OUTPATIENT
Start: 2020-10-11

## 2020-10-11 RX ORDER — SODIUM CHLORIDE 0.9 % (FLUSH) 0.9 %
5-40 SYRINGE (ML) INJECTION EVERY 8 HOURS
Status: DISCONTINUED | OUTPATIENT
Start: 2020-10-11 | End: 2020-10-16 | Stop reason: HOSPADM

## 2020-10-11 RX ORDER — HYDRALAZINE HYDROCHLORIDE 20 MG/ML
10 INJECTION INTRAMUSCULAR; INTRAVENOUS ONCE
Status: COMPLETED | OUTPATIENT
Start: 2020-10-11 | End: 2020-10-11

## 2020-10-11 RX ORDER — LEVALBUTEROL INHALATION SOLUTION 1.25 MG/3ML
1.25 SOLUTION RESPIRATORY (INHALATION)
Status: DISCONTINUED | OUTPATIENT
Start: 2020-10-11 | End: 2020-10-12

## 2020-10-11 RX ORDER — LORAZEPAM 2 MG/ML
1 INJECTION INTRAMUSCULAR
Status: CANCELLED | OUTPATIENT
Start: 2020-10-11

## 2020-10-11 RX ORDER — HALOPERIDOL 5 MG/ML
2 INJECTION INTRAMUSCULAR ONCE
Status: COMPLETED | OUTPATIENT
Start: 2020-10-11 | End: 2020-10-11

## 2020-10-11 RX ORDER — ATORVASTATIN CALCIUM 40 MG/1
40 TABLET, FILM COATED ORAL
Status: CANCELLED | OUTPATIENT
Start: 2020-10-11

## 2020-10-11 RX ORDER — LABETALOL HYDROCHLORIDE 5 MG/ML
20 INJECTION, SOLUTION INTRAVENOUS
Status: DISCONTINUED | OUTPATIENT
Start: 2020-10-11 | End: 2020-10-16 | Stop reason: HOSPADM

## 2020-10-11 RX ORDER — LORAZEPAM 2 MG/ML
2 INJECTION INTRAMUSCULAR ONCE
Status: COMPLETED | OUTPATIENT
Start: 2020-10-11 | End: 2020-10-11

## 2020-10-11 RX ORDER — HEPARIN SODIUM 5000 [USP'U]/ML
60 INJECTION, SOLUTION INTRAVENOUS; SUBCUTANEOUS ONCE
Status: COMPLETED | OUTPATIENT
Start: 2020-10-11 | End: 2020-10-11

## 2020-10-11 RX ORDER — SODIUM CHLORIDE 0.9 % (FLUSH) 0.9 %
5-40 SYRINGE (ML) INJECTION EVERY 8 HOURS
Status: CANCELLED | OUTPATIENT
Start: 2020-10-11

## 2020-10-11 RX ORDER — LORAZEPAM 2 MG/ML
1 INJECTION INTRAMUSCULAR
Status: DISCONTINUED | OUTPATIENT
Start: 2020-10-11 | End: 2020-10-11

## 2020-10-11 RX ORDER — HEPARIN SODIUM 5000 [USP'U]/ML
35 INJECTION, SOLUTION INTRAVENOUS; SUBCUTANEOUS ONCE
Status: COMPLETED | OUTPATIENT
Start: 2020-10-12 | End: 2020-10-12

## 2020-10-11 RX ORDER — ZOLPIDEM TARTRATE 5 MG/1
5 TABLET ORAL
Status: DISCONTINUED | OUTPATIENT
Start: 2020-10-11 | End: 2020-10-12

## 2020-10-11 RX ORDER — SODIUM CHLORIDE, SODIUM LACTATE, POTASSIUM CHLORIDE, CALCIUM CHLORIDE 600; 310; 30; 20 MG/100ML; MG/100ML; MG/100ML; MG/100ML
100 INJECTION, SOLUTION INTRAVENOUS CONTINUOUS
Status: DISPENSED | OUTPATIENT
Start: 2020-10-11 | End: 2020-10-12

## 2020-10-11 RX ORDER — HALOPERIDOL 5 MG/ML
INJECTION INTRAMUSCULAR
Status: DISCONTINUED
Start: 2020-10-11 | End: 2020-10-11 | Stop reason: HOSPADM

## 2020-10-11 RX ORDER — HEPARIN SODIUM 5000 [USP'U]/100ML
12-25 INJECTION, SOLUTION INTRAVENOUS
Status: DISCONTINUED | OUTPATIENT
Start: 2020-10-11 | End: 2020-10-14

## 2020-10-11 RX ORDER — GUAIFENESIN 100 MG/5ML
81 LIQUID (ML) ORAL DAILY
Status: DISCONTINUED | OUTPATIENT
Start: 2020-10-12 | End: 2020-10-16 | Stop reason: HOSPADM

## 2020-10-11 RX ORDER — CARVEDILOL 6.25 MG/1
6.25 TABLET ORAL 2 TIMES DAILY WITH MEALS
Status: DISCONTINUED | OUTPATIENT
Start: 2020-10-11 | End: 2020-10-13

## 2020-10-11 RX ORDER — LORAZEPAM 2 MG/ML
1 INJECTION INTRAMUSCULAR
Status: DISCONTINUED | OUTPATIENT
Start: 2020-10-11 | End: 2020-10-16 | Stop reason: HOSPADM

## 2020-10-11 RX ORDER — METOPROLOL TARTRATE 5 MG/5ML
5 INJECTION INTRAVENOUS
Status: DISCONTINUED | OUTPATIENT
Start: 2020-10-11 | End: 2020-10-16 | Stop reason: HOSPADM

## 2020-10-11 RX ORDER — ZOLPIDEM TARTRATE 5 MG/1
5 TABLET ORAL
Status: CANCELLED | OUTPATIENT
Start: 2020-10-11

## 2020-10-11 RX ORDER — MORPHINE SULFATE 2 MG/ML
2 INJECTION, SOLUTION INTRAMUSCULAR; INTRAVENOUS
Status: DISCONTINUED | OUTPATIENT
Start: 2020-10-11 | End: 2020-10-16 | Stop reason: HOSPADM

## 2020-10-11 RX ADMIN — HALOPERIDOL LACTATE 2 MG: 5 INJECTION INTRAMUSCULAR at 00:32

## 2020-10-11 RX ADMIN — NITROGLYCERIN 1 INCH: 20 OINTMENT TOPICAL at 00:39

## 2020-10-11 RX ADMIN — NITROGLYCERIN 1 INCH: 20 OINTMENT TOPICAL at 17:31

## 2020-10-11 RX ADMIN — HEPARIN SODIUM 5850 UNITS: 5000 INJECTION INTRAVENOUS; SUBCUTANEOUS at 16:31

## 2020-10-11 RX ADMIN — Medication 10 ML: at 22:09

## 2020-10-11 RX ADMIN — Medication 10 ML: at 05:56

## 2020-10-11 RX ADMIN — LORAZEPAM 1 MG: 2 INJECTION INTRAMUSCULAR; INTRAVENOUS at 02:42

## 2020-10-11 RX ADMIN — LORAZEPAM 2 MG: 2 INJECTION INTRAMUSCULAR; INTRAVENOUS at 13:15

## 2020-10-11 RX ADMIN — HEPARIN SODIUM 12 UNITS/KG/HR: 5000 INJECTION, SOLUTION INTRAVENOUS at 16:34

## 2020-10-11 RX ADMIN — NITROGLYCERIN 1 INCH: 20 OINTMENT TOPICAL at 05:57

## 2020-10-11 RX ADMIN — HALOPERIDOL LACTATE 2 MG: 5 INJECTION INTRAMUSCULAR at 05:56

## 2020-10-11 RX ADMIN — PERFLUTREN 1 ML: 6.52 INJECTION, SUSPENSION INTRAVENOUS at 13:20

## 2020-10-11 RX ADMIN — LABETALOL HYDROCHLORIDE 20 MG: 5 INJECTION INTRAVENOUS at 20:57

## 2020-10-11 RX ADMIN — LORAZEPAM 1 MG: 2 INJECTION INTRAMUSCULAR; INTRAVENOUS at 20:57

## 2020-10-11 RX ADMIN — HALOPERIDOL 2 MG: 5 INJECTION INTRAMUSCULAR at 04:48

## 2020-10-11 RX ADMIN — LORAZEPAM 1 MG: 2 INJECTION INTRAMUSCULAR; INTRAVENOUS at 17:31

## 2020-10-11 RX ADMIN — HALOPERIDOL LACTATE 2 MG: 5 INJECTION, SOLUTION INTRAMUSCULAR at 14:32

## 2020-10-11 RX ADMIN — LORAZEPAM 1 MG: 2 INJECTION INTRAMUSCULAR; INTRAVENOUS at 08:17

## 2020-10-11 RX ADMIN — LORAZEPAM 1 MG: 2 INJECTION INTRAMUSCULAR; INTRAVENOUS at 16:40

## 2020-10-11 RX ADMIN — PIPERACILLIN AND TAZOBACTAM 3.38 G: 3; .375 INJECTION, POWDER, LYOPHILIZED, FOR SOLUTION INTRAVENOUS at 00:42

## 2020-10-11 RX ADMIN — HALOPERIDOL LACTATE 2 MG: 5 INJECTION, SOLUTION INTRAMUSCULAR at 04:48

## 2020-10-11 RX ADMIN — ENOXAPARIN SODIUM 40 MG: 40 INJECTION SUBCUTANEOUS at 05:56

## 2020-10-11 RX ADMIN — MORPHINE SULFATE 2 MG: 2 INJECTION, SOLUTION INTRAMUSCULAR; INTRAVENOUS at 04:29

## 2020-10-11 RX ADMIN — SODIUM CHLORIDE, SODIUM LACTATE, POTASSIUM CHLORIDE, AND CALCIUM CHLORIDE 100 ML/HR: 600; 310; 30; 20 INJECTION, SOLUTION INTRAVENOUS at 10:54

## 2020-10-11 RX ADMIN — LORAZEPAM 1 MG: 2 INJECTION INTRAMUSCULAR; INTRAVENOUS at 11:19

## 2020-10-11 RX ADMIN — PIPERACILLIN AND TAZOBACTAM 3.38 G: 3; .375 INJECTION, POWDER, LYOPHILIZED, FOR SOLUTION INTRAVENOUS at 08:17

## 2020-10-11 RX ADMIN — NITROGLYCERIN 1 INCH: 20 OINTMENT TOPICAL at 13:35

## 2020-10-11 RX ADMIN — Medication 10 ML: at 13:36

## 2020-10-11 RX ADMIN — PIPERACILLIN SODIUM AND TAZOBACTAM SODIUM 3.38 G: 3; .375 INJECTION, POWDER, LYOPHILIZED, FOR SOLUTION INTRAVENOUS at 16:31

## 2020-10-11 RX ADMIN — HYDRALAZINE HYDROCHLORIDE 10 MG: 20 INJECTION INTRAMUSCULAR; INTRAVENOUS at 02:41

## 2020-10-11 RX ADMIN — ACETAMINOPHEN 650 MG: 650 SUPPOSITORY RECTAL at 17:31

## 2020-10-11 NOTE — PROGRESS NOTES
Primary RN at bedside providing direct patient care. Dr. Jose Baum notified via perfect serve that patient is pulling against restraints, attempting to exit bed despite restraints, pulling at monitoring equipment, yelling out and wheezing with exertion. Order received for repeat IM dose of haldol 2 mg. Tiago Swenson, primary RN made aware.
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
## 2020-10-11 NOTE — PROGRESS NOTES
Patient very agitated, attempting to climb out of bed, remove monitoring equipment, and attempting to hit, kick, bite, and pinch nursing staff. Patient unable to be redirected and was placed again in restraints to prevent further interruption in medical treatment. Physician notified. Orders given for restraints and for Haldol 2 mg IM x 1. Request made for physician to examine patient face to face per restraint protocols.

## 2020-10-11 NOTE — ROUTINE PROCESS
Bedside report given to Fortino Gunter, [facility]. Opportunity for questions, concerns. Patient involved. Made aware of patient's restraint order.

## 2020-10-11 NOTE — PROGRESS NOTES
Events that transpired today:    11:19- Patient given Ativan 1mg IV PRN for increased agitation. Wife at bedside. 12:15- Bilateral wrist restraints replaced. Patient pulling at lines and jacobs catheter. Confused. Oriented to self. 13:15- Echo tech at bedside and cannot perform due to combative patient. Cardiology MD Tonja Chand notified. Verbal order given to administer 2mg of IV Ativan. Assisted restraining patient for echo. 13:42- Patient noted to be having periods of apnea. Hospitalist MD notified. Request for ABG. Order placed. 14:32- Patient still agitated and pulling at equipment. MD notified. Order for Haldol 2mg IM once given. Also, Speech therapy could not see patient due to agitation. Recommend to keep NPO. MD notified. 14:34- Patient's temp 100.3. Noted to be warm to touch. MD notified. Tylenol RE order given. 16:30- wife having questions about echo. Cardiology notified. Also given Ativan 1mg IV PRN. 17:02- Patient continues to be agitated. Restraints in place. MD Hospitalist notified. At 17:31, verbal order given to administer an additional 1mg of Ativan IV. Patient repositioned to be side lying. 18:00- patient resting in bed quietly. No distress noted. Restraints still in place. 19:30- patient resting comfortably. Wife updated.

## 2020-10-11 NOTE — PROGRESS NOTES
Physician notified of patient's urinary retention and past prostate issues. Instructed to continue to straight cath patient at this time.

## 2020-10-11 NOTE — PROGRESS NOTES
abg completed, patient in restraints and required additional help from RN to hold down, patient flinched and abg results are mixed with venous, oxygen saturation 95% on room air, placed on 3L.

## 2020-10-11 NOTE — PROGRESS NOTES
TRANSFER - IN REPORT:    Verbal report received from Mansoor Aguillon RN on Angela Sandoval being received from  ICU  for routine progression of care. Report consisted of patients Situation, Background, Assessment and Recommendations(SBAR). Information from the following report(s) SBAR, Kardex, ED Summary, MAR, Recent Results and Cardiac Rhythm ST was reviewed. Opportunity for questions and clarification was provided. Assessment completed upon patients arrival to unit and care assumed. Patient received to room 322. Patient connected to monitor and assessment completed. Plan of care reviewed. Patient oriented to room and call light. Patient aware to use call light to communicate any chest pain or needs. Admission skin assessment completed with second RN and reveals the following: Sacrum intact. No evident skin issues noted. Right shoulder with incision from post arthroplasty rotator cuff repair. Allevyn in place.

## 2020-10-11 NOTE — PROGRESS NOTES
Physician informed of patient's critical troponin of 3153.4 and the continued elevated BP of 198/102. Orders given for hydralazine 10 mg IV x 1. Patient not complaining of chest pain and no obvious EKG changes on the monitor. Patient continues to be restless and becomes easily agitated. Will continue to monitor.

## 2020-10-11 NOTE — CONSULTS
[facility] CARDIOLOGY History &Physical                 Subjective:     History obtained from the chart and per patient's wife as he is delirious unable to give a subjective history. Patient is a 72 y.o. male with a hx of hypertension, hyperlipidemia, former tobacco user, who presented with chest pain as well as shortness of breath. Found to have findings consistent with pneumonia, of note, patient recently underwent shoulder surgery on October 9, 2020 which was uncomplicated with no obvious anesthetic complications. He been admitted to the internal medicine service at [facility] with severely elevated BP, elevated serum white count, initially mildly elevated serum troponin which has continued to rise. Patient has been profoundly hypertensive at times with systolic blood pressure readings greater than 200 mmHg. Serum troponin initially mildly elevated and continue to rise and upon most recent measurement 18,391. Patient is unable to give any kind of subjective complaint is currently tied to the bed with restraints in an effort to protect himself and staff. Blood pressure has improved and the patient had been started on medical therapy including aspirin and atorvastatin. He has been given multiple doses of Haldol overnight for agitation and delirium. CT pulmonary embolism protocol was performed yesterday morning (10/10/2020 0141) without evidence of pulmonary embolism, consolidation of the right lower lobe concerning for aspiration pneumonia, no pulmonary edema or pleural effusion, evidence of coronary artery calcifications.     Lab Results   Component Value Date     10/11/2020    K 3.6 10/11/2020    BUN 24 (H) 10/11/2020    CREA 1.20 10/11/2020    WBC 14.5 (H) 10/11/2020    HGB 12.3 (L) 10/11/2020     10/11/2020    TSH 1.910 04/18/2018    T4 8.3 04/18/2018        Past Medical History:   Diagnosis Date    AION (acute ischemic optic neuropathy)     AION (acute ischemic optic neuropathy), bilateral     Former smoker, stopped smoking in distant past      quit    Hepatitis A     Hypercholesterolemia     Hypertension     Prostate cancer ([facility] [address] 2009    Wears hearing aid in both ears       Past Surgical History:   Procedure Laterality Date    HX HEART CATHETERIZATION      no intervention, normal    HX PROSTATE SURGERY  2016    HX TONSIL AND ADENOIDECTOMY        Family History   Problem Relation Age of Onset    Diabetes Mother     Heart Disease Father     Cancer Sister     Heart Disease Sister       Social History     Tobacco Use    Smoking status: Former Smoker     Types: Cigarettes     Last attempt to quit:      Years since quittin.8    Smokeless tobacco: Never Used   Substance Use Topics    Alcohol use: No      Allergies   Allergen Reactions    Hydrocodone Itching     Pt states incorrect/ allergy is to oxycodone. Pt states he can tolerate hydrocodone 10/8/20 Please remove    Codeine Other (comments)     convulsions    Oxycodone Itching     Can tolerate Hydrocodone     ROS -unable to obtain due to altered mental status    Objective:     Visit Vitals  BP (!) 136/51 (BP 1 Location: Left arm, BP Patient Position: At rest)   Pulse 86   Temp 99.1 °F (37.3 °C)   Resp 18   Wt 215 lb 3.2 oz (97.6 kg)   SpO2 97%   BMI 31.78 kg/m²     10/11 07 - 10/11 1900  In: -   Out: 200 [Urine:200]  No intake/output data recorded. Physical Exam   Constitutional: He appears well-developed and well-nourished. HENT:   Head: Normocephalic and atraumatic. Nose: Nose normal.   Eyes: Right eye exhibits no discharge. Left eye exhibits no discharge. Neck: Normal range of motion. Neck supple. No JVD present. No tracheal deviation present. No thyromegaly present. Cardiovascular: Tachycardia present. Exam reveals no gallop, no distant heart sounds, no friction rub, no midsystolic click and no opening snap. Murmur (diastolic murmur RUSB) heard.   Pulmonary/Chest: Effort normal. No stridor. No respiratory distress. He has no wheezes. He has rales (minimal rales/rhonchi RLL). Abdominal: Soft. He exhibits no distension. There is no guarding. Musculoskeletal:         General: No edema (of the legs bilaterally. ). Neurological:   Unable to assess at this time due to mental status. Skin: Skin is warm and dry. He is not diaphoretic. Psychiatric:   Delirious. Data Review:   Recent Labs     10/11/20  0351 10/10/20  1036    139   K 3.6 4.1   BUN 24* 35*   CREA 1.20 1.78*   GLU 92 158*   WBC 14.5* 26.7*   HGB 12.3* 13.1*   HCT 36.9* 42.2    279   CHOL 137  --    LDLC 69.6  --    HDL 39*  --      CXR Results  (Last 48 hours)               10/10/20 0038  XR CHEST PORT Final result    Impression:  IMPRESSION:       1. Mild right lung base opacity, likely atelectasis or consolidation. Narrative:  Portable chest xray         COMPARISON: none. INDICATION: chest pain       FINDINGS:        Heart appears mildly enlarged. Mediastinal contour is within normal limits. Lungs are underinflated. There is mild right lung base opacity. No pneumothorax   or pulmonary edema. No large pleural effusion. There is elevation of the right   hemidiaphragm. Surrounding bones are unremarkable. Assessment/Plan:   Principal Problem: Active Problems:    Mixed hyperlipidemia (4/4/2017)      Overview: Last Assessment & Plan:       Lipids at goal continue current treatment      Elevated troponin (10/10/2020)   - EKG today reveals sinus tachycardia, right bundle branch block, PVC, no evidence of acute ST segment changes consistent with ischemia, injury, infarct pattern.     - Continue aspirin and atorvastatin   - We will attempt to obtain echocardiogram now to evaluate left ventricular systolic function as well as valves   - Wife denies any history of intracranial hemorrhage or life-threatening bleeding, GI bleeding   - As long as the patient can be safely restrained reasonable to consider starting heparin, will plan to start as long as echocardiogram does not show concern for aortic dissection.   - No emergent indication at this time for coronary angiography at this time         Hypertensive urgency, malignant (10/11/2020)   - BP now improved, was severely elevated which can raise serum troponin as well       Leukocytosis (10/10/2020)    Sepsis ([facility] [address] (10/11/2020)    Aspiration pneumonia ([facility] [address] (10/10/2020)   - per medicine team, on ABX, CT chest concerning for consolidation RLL   - possible etiology of troponin elevation      Hyperlipidemia    - Atorvastatin 40       Acute delirium (10/11/2020)   - wife denies etoh abuse, monitor and consider CIWA protocol      Acute kidney injury ([facility] [address] (10/11/2020)    - improved     Franck Ryan DO  10/11/2020  12:29 PM

## 2020-10-11 NOTE — ROUTINE PROCESS
Bedside and Verbal shift change report given to Perez Bradley RN (oncoming nurse) by self [facility] ). Report included the following information SBAR, Kardex, Intake/Output, MAR, Recent Results and Cardiac Rhythm ST. Heparin gtt verified.

## 2020-10-11 NOTE — PROGRESS NOTES
Patient's BP again elevated. (219/109). Physician notified and additional dose of hydralazine 10 mg IV ordered. Will continue to monitor.

## 2020-10-11 NOTE — PROGRESS NOTES
Patient chart reviewed for discharge needs. SW went to meet with patient who was unable to participate in a needs assessment. Per RN, patient is anticipated to transfer DT this morning. No family at the bedside. SW/CM dept to continue to follow.      Aditi Pabon, [address] [facility]    [address] [facility]    * [email or web address]    ( [phone]

## 2020-10-11 NOTE — PROGRESS NOTES
Physician informed of critical troponin of 3153.4 and continued increase in BP to 198/102. Patient has no chest pain at this time but continues to be restless and agitated. Orders given for Hydralazine 10 mg IV x 1.

## 2020-10-11 NOTE — PROGRESS NOTES
TRANSFER - OUT REPORT:    Verbal report given to [address] RN(name) on [employer]  being transferred to [facility](unit) for change in patient condition(increased confusion and elevated trop)       Report consisted of patients Situation, Background, Assessment and   Recommendations(SBAR). Information from the following report(s) SBAR, Kardex, ED Summary, OR Summary, Procedure Summary, Intake/Output, Accordion, Recent Results, Med Rec Status and Cardiac Rhythm st was reviewed with the receiving nurse. Lines:   Peripheral IV 10/10/20 Anterior;Distal;Left Forearm (Active)   Site Assessment Clean, dry, & intact 10/11/20 0330   Phlebitis Assessment 0 10/11/20 0330   Infiltration Assessment 0 10/11/20 0330   Dressing Status Clean, dry, & intact 10/11/20 0330   Dressing Type Transparent 10/11/20 0330   Hub Color/Line Status Pink; Infusing;Patent 10/11/20 0330   Alcohol Cap Used No 10/11/20 0330        Opportunity for questions and clarification was provided.       Patient transported with:  [address] paramedics

## 2020-10-11 NOTE — PROGRESS NOTES
Patient remains delirious. Unclear etiology, wife denies knowledge of etoh use. Wife updated on echo findings of mildly decreased LVEF at bedside. No evidence of ST elevation, at this time will continue to treat medically for NSTEMI with heparin gtt, ASA given. Discussed with interventional cardioliogist on call. Agree that risks outweigh benefit at this time for invasive coronary angiography. NPO for consideration of cath tomorrow, pending improvement in mental status and ability to undergo invasive cardiac procedure safely. Monitor overnight for changes in status. Currently hemodynamically and electrically stable.

## 2020-10-11 NOTE — ROUTINE PROCESS
Bedside and Verbal shift change report received from Cresencio Rm (offgoing nurse). Report included the following information SBAR, Kardex, Procedure Summary and Recent Results. Opportunity for questions provided. Heparin IV drip verified at bedside with oncoming RN.

## 2020-10-11 NOTE — PROGRESS NOTES
Speech Pathology Note:    Patient quietly sleeping in room. Spoke with nursing re: concern for aspiration increased with current sedated state. Recommend NPO until alert. Will follow for completion of bedside swallow evaluation. Thank you,  Karlene Halsted Lemkes, MS, CCC-SLP
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
## 2020-10-12 ENCOUNTER — APPOINTMENT (OUTPATIENT)
Dept: GENERAL RADIOLOGY | Age: 70
DRG: 871 | End: 2020-10-12
Attending: INTERNAL MEDICINE
Payer: MEDICARE

## 2020-10-12 ENCOUNTER — APPOINTMENT (OUTPATIENT)
Dept: CT IMAGING | Age: 70
DRG: 871 | End: 2020-10-12
Attending: INTERNAL MEDICINE
Payer: MEDICARE

## 2020-10-12 PROBLEM — R41.0 DELIRIUM: Status: ACTIVE | Noted: 2020-10-12

## 2020-10-12 LAB
ANION GAP SERPL CALC-SCNC: 10 MMOL/L (ref 7–16)
APTT PPP: 104.9 SEC (ref 24.3–35.4)
APTT PPP: 150.1 SEC (ref 24.3–35.4)
APTT PPP: 59.2 SEC (ref 24.3–35.4)
BASOPHILS # BLD: 0.1 K/UL (ref 0–0.2)
BASOPHILS NFR BLD: 1 % (ref 0–2)
BUN SERPL-MCNC: 19 MG/DL (ref 8–23)
CALCIUM SERPL-MCNC: 8.3 MG/DL (ref 8.3–10.4)
CHLORIDE SERPL-SCNC: 109 MMOL/L (ref 98–107)
CO2 SERPL-SCNC: 23 MMOL/L (ref 21–32)
CREAT SERPL-MCNC: 1.02 MG/DL (ref 0.8–1.5)
DIFFERENTIAL METHOD BLD: ABNORMAL
EOSINOPHIL # BLD: 0.3 K/UL (ref 0–0.8)
EOSINOPHIL NFR BLD: 2 % (ref 0.5–7.8)
ERYTHROCYTE [DISTWIDTH] IN BLOOD BY AUTOMATED COUNT: 13.2 % (ref 11.9–14.6)
GLUCOSE SERPL-MCNC: 95 MG/DL (ref 65–100)
HCT VFR BLD AUTO: 36.5 % (ref 41.1–50.3)
HGB BLD-MCNC: 12.6 G/DL (ref 13.6–17.2)
IMM GRANULOCYTES # BLD AUTO: 0.1 K/UL (ref 0–0.5)
IMM GRANULOCYTES NFR BLD AUTO: 1 % (ref 0–5)
LYMPHOCYTES # BLD: 2.1 K/UL (ref 0.5–4.6)
LYMPHOCYTES NFR BLD: 16 % (ref 13–44)
MCH RBC QN AUTO: 29.4 PG (ref 26.1–32.9)
MCHC RBC AUTO-ENTMCNC: 34.5 G/DL (ref 31.4–35)
MCV RBC AUTO: 85.1 FL (ref 79.6–97.8)
MONOCYTES # BLD: 1.7 K/UL (ref 0.1–1.3)
MONOCYTES NFR BLD: 13 % (ref 4–12)
NEUTS SEG # BLD: 9.3 K/UL (ref 1.7–8.2)
NEUTS SEG NFR BLD: 69 % (ref 43–78)
NRBC # BLD: 0 K/UL (ref 0–0.2)
PLATELET # BLD AUTO: 231 K/UL (ref 150–450)
PMV BLD AUTO: 11.4 FL (ref 9.4–12.3)
POTASSIUM SERPL-SCNC: 3.6 MMOL/L (ref 3.5–5.1)
RBC # BLD AUTO: 4.29 M/UL (ref 4.23–5.6)
SODIUM SERPL-SCNC: 142 MMOL/L (ref 136–145)
WBC # BLD AUTO: 13.6 K/UL (ref 4.3–11.1)

## 2020-10-12 PROCEDURE — 74011250637 HC RX REV CODE- 250/637: Performed by: HOSPITALIST

## 2020-10-12 PROCEDURE — 2709999900 HC NON-CHARGEABLE SUPPLY

## 2020-10-12 PROCEDURE — 71045 X-RAY EXAM CHEST 1 VIEW: CPT

## 2020-10-12 PROCEDURE — 74011000250 HC RX REV CODE- 250: Performed by: HOSPITALIST

## 2020-10-12 PROCEDURE — 92610 EVALUATE SWALLOWING FUNCTION: CPT

## 2020-10-12 PROCEDURE — 85730 THROMBOPLASTIN TIME PARTIAL: CPT

## 2020-10-12 PROCEDURE — 70450 CT HEAD/BRAIN W/O DYE: CPT

## 2020-10-12 PROCEDURE — 74011250636 HC RX REV CODE- 250/636: Performed by: INTERNAL MEDICINE

## 2020-10-12 PROCEDURE — 36415 COLL VENOUS BLD VENIPUNCTURE: CPT

## 2020-10-12 PROCEDURE — 99232 SBSQ HOSP IP/OBS MODERATE 35: CPT | Performed by: INTERNAL MEDICINE

## 2020-10-12 PROCEDURE — 74011000250 HC RX REV CODE- 250: Performed by: INTERNAL MEDICINE

## 2020-10-12 PROCEDURE — 74011250636 HC RX REV CODE- 250/636: Performed by: HOSPITALIST

## 2020-10-12 PROCEDURE — 74011000258 HC RX REV CODE- 258: Performed by: HOSPITALIST

## 2020-10-12 PROCEDURE — 94640 AIRWAY INHALATION TREATMENT: CPT

## 2020-10-12 PROCEDURE — 74011250637 HC RX REV CODE- 250/637: Performed by: INTERNAL MEDICINE

## 2020-10-12 PROCEDURE — 85025 COMPLETE CBC W/AUTO DIFF WBC: CPT

## 2020-10-12 PROCEDURE — 77030013140 HC MSK NEB VYRM -A

## 2020-10-12 PROCEDURE — 65270000029 HC RM PRIVATE

## 2020-10-12 PROCEDURE — 80048 BASIC METABOLIC PNL TOTAL CA: CPT

## 2020-10-12 RX ORDER — DEXTROSE, SODIUM CHLORIDE, SODIUM LACTATE, POTASSIUM CHLORIDE, AND CALCIUM CHLORIDE 5; .6; .31; .03; .02 G/100ML; G/100ML; G/100ML; G/100ML; G/100ML
75 INJECTION, SOLUTION INTRAVENOUS CONTINUOUS
Status: DISCONTINUED | OUTPATIENT
Start: 2020-10-12 | End: 2020-10-14

## 2020-10-12 RX ORDER — HEPARIN SODIUM 5000 [USP'U]/ML
35 INJECTION, SOLUTION INTRAVENOUS; SUBCUTANEOUS ONCE
Status: COMPLETED | OUTPATIENT
Start: 2020-10-12 | End: 2020-10-12

## 2020-10-12 RX ORDER — ONDANSETRON 2 MG/ML
4 INJECTION INTRAMUSCULAR; INTRAVENOUS
Status: DISCONTINUED | OUTPATIENT
Start: 2020-10-12 | End: 2020-10-16 | Stop reason: HOSPADM

## 2020-10-12 RX ORDER — HALOPERIDOL 5 MG/ML
2 INJECTION INTRAMUSCULAR ONCE
Status: COMPLETED | OUTPATIENT
Start: 2020-10-12 | End: 2020-10-12

## 2020-10-12 RX ORDER — ACETAMINOPHEN 325 MG/1
650 TABLET ORAL
Status: DISCONTINUED | OUTPATIENT
Start: 2020-10-12 | End: 2020-10-16 | Stop reason: HOSPADM

## 2020-10-12 RX ORDER — HALOPERIDOL 5 MG/ML
2 INJECTION INTRAMUSCULAR
Status: DISCONTINUED | OUTPATIENT
Start: 2020-10-12 | End: 2020-10-16 | Stop reason: HOSPADM

## 2020-10-12 RX ORDER — HALOPERIDOL 5 MG/ML
4 INJECTION INTRAMUSCULAR
Status: DISCONTINUED | OUTPATIENT
Start: 2020-10-12 | End: 2020-10-16 | Stop reason: HOSPADM

## 2020-10-12 RX ORDER — QUETIAPINE FUMARATE 25 MG/1
12.5 TABLET, FILM COATED ORAL 2 TIMES DAILY
Status: DISCONTINUED | OUTPATIENT
Start: 2020-10-12 | End: 2020-10-16 | Stop reason: HOSPADM

## 2020-10-12 RX ADMIN — Medication 5 ML: at 06:17

## 2020-10-12 RX ADMIN — LEVALBUTEROL HYDROCHLORIDE 1.25 MG: 1.25 SOLUTION RESPIRATORY (INHALATION) at 07:19

## 2020-10-12 RX ADMIN — HALOPERIDOL LACTATE 2 MG: 5 INJECTION, SOLUTION INTRAMUSCULAR at 10:53

## 2020-10-12 RX ADMIN — ASPIRIN 81 MG: 81 TABLET, CHEWABLE ORAL at 10:42

## 2020-10-12 RX ADMIN — CARVEDILOL 6.25 MG: 6.25 TABLET, FILM COATED ORAL at 10:42

## 2020-10-12 RX ADMIN — HALOPERIDOL LACTATE 2 MG: 5 INJECTION, SOLUTION INTRAMUSCULAR at 17:21

## 2020-10-12 RX ADMIN — PIPERACILLIN SODIUM AND TAZOBACTAM SODIUM 3.38 G: 3; .375 INJECTION, POWDER, LYOPHILIZED, FOR SOLUTION INTRAVENOUS at 10:38

## 2020-10-12 RX ADMIN — NITROGLYCERIN 1 INCH: 20 OINTMENT TOPICAL at 06:17

## 2020-10-12 RX ADMIN — LABETALOL HYDROCHLORIDE 20 MG: 5 INJECTION INTRAVENOUS at 18:47

## 2020-10-12 RX ADMIN — NITROGLYCERIN 1 INCH: 20 OINTMENT TOPICAL at 00:15

## 2020-10-12 RX ADMIN — SODIUM CHLORIDE, SODIUM LACTATE, POTASSIUM CHLORIDE, CALCIUM CHLORIDE, AND DEXTROSE MONOHYDRATE 50 ML/HR: 600; 310; 30; 20; 5 INJECTION, SOLUTION INTRAVENOUS at 16:27

## 2020-10-12 RX ADMIN — MORPHINE SULFATE 2 MG: 2 INJECTION, SOLUTION INTRAMUSCULAR; INTRAVENOUS at 14:31

## 2020-10-12 RX ADMIN — LORAZEPAM 1 MG: 2 INJECTION INTRAMUSCULAR; INTRAVENOUS at 02:49

## 2020-10-12 RX ADMIN — HEPARIN SODIUM 3250 UNITS: 5000 INJECTION INTRAVENOUS; SUBCUTANEOUS at 12:29

## 2020-10-12 RX ADMIN — Medication 10 ML: at 13:12

## 2020-10-12 RX ADMIN — Medication 10 ML: at 22:30

## 2020-10-12 RX ADMIN — HEPARIN SODIUM 3400 UNITS: 5000 INJECTION INTRAVENOUS; SUBCUTANEOUS at 00:15

## 2020-10-12 RX ADMIN — MORPHINE SULFATE 2 MG: 2 INJECTION, SOLUTION INTRAMUSCULAR; INTRAVENOUS at 21:27

## 2020-10-12 RX ADMIN — HEPARIN SODIUM 12 UNITS/KG/HR: 5000 INJECTION, SOLUTION INTRAVENOUS at 10:47

## 2020-10-12 RX ADMIN — HALOPERIDOL LACTATE 2 MG: 5 INJECTION INTRAMUSCULAR at 12:31

## 2020-10-12 RX ADMIN — PIPERACILLIN SODIUM AND TAZOBACTAM SODIUM 3.38 G: 3; .375 INJECTION, POWDER, LYOPHILIZED, FOR SOLUTION INTRAVENOUS at 18:23

## 2020-10-12 RX ADMIN — FAMOTIDINE 20 MG: 10 INJECTION INTRAVENOUS at 21:35

## 2020-10-12 RX ADMIN — PIPERACILLIN SODIUM AND TAZOBACTAM SODIUM 3.38 G: 3; .375 INJECTION, POWDER, LYOPHILIZED, FOR SOLUTION INTRAVENOUS at 00:15

## 2020-10-12 RX ADMIN — FAMOTIDINE 20 MG: 10 INJECTION INTRAVENOUS at 10:39

## 2020-10-12 RX ADMIN — LORAZEPAM 1 MG: 2 INJECTION INTRAMUSCULAR; INTRAVENOUS at 07:21

## 2020-10-12 RX ADMIN — QUETIAPINE FUMARATE 12.5 MG: 25 TABLET, FILM COATED ORAL at 10:42

## 2020-10-12 RX ADMIN — HEPARIN SODIUM 12 UNITS/KG/HR: 5000 INJECTION, SOLUTION INTRAVENOUS at 06:16

## 2020-10-12 NOTE — PROGRESS NOTES
Occupational Therapy Note:    OT orders received and chart review initiated. Discussed patient with RN who reports patient not appropriate for OT today. Will hold and attempt another time/day as schedule.     Sherry Gil, ANNE MARIER/L, CLT

## 2020-10-12 NOTE — ROUTINE PROCESS
Verbal bedside report given to Brooklyn Cleverly, oncoming RN. Patient's situation, background, assessment and recommendations provided. Opportunity for questions provided. Oncoming RN assumed care of patient.

## 2020-10-12 NOTE — PROGRESS NOTES
[facility] CARDIOLOGY PROGRESS NOTE    10/12/2020 7:34 AM    Admit Date: 10/11/2020        Subjective:   Stable overnight without angina, CHF, or palpitations but still agitated/AMS with 4 point restraints. BP high/labile. NSR on tele. Vitals otherwise stable and controlled. No other complaints overnight. Tolerating meds well. Objective:      Vitals:    10/11/20 2115 10/12/20 0025 10/12/20 0532 10/12/20 0719   BP:  (!) 176/85 (!) 136/91    Pulse:  91 97    Resp:  24 24    Temp:  100.1 °F (37.8 °C) 99.1 °F (37.3 °C)    SpO2: 95% 96% 96% 92%   Weight:   205 lb 6.4 oz (93.2 kg)        Physical Exam:  Neck- supple, cannot assess JVD  CV- regular rate and rhythm no MRG  Lung- clear bilaterally anteriorly  Abd- soft, nontender, nondistended  Ext- no edema, in 4 point restraints  Skin- warm and dry  Psych- oriented to person, year, but not place. Slow responses    Data Review:   Recent Labs     10/12/20  0352 10/11/20  1501 10/11/20  0351 10/10/20  1036   NA  --   --  139 139   K  --   --  3.6 4.1   BUN  --   --  24* 35*   CREA  --   --  1.20 1.78*   GLU  --   --  92 158*   WBC 13.6* 11.3* 14.5* 26.7*   HGB 12.6* 12.7* 12.3* 13.1*   HCT 36.5* 37.0* 36.9* 42.2    206 193 279   CHOL  --   --  137  --    TRIGL  --   --  142  --    HDL  --   --  39*  --        Assessment and Plan:     Principal Problem:    Chest pain (10/10/2020)- denies CP at present, see below. Active Problems:    Mixed hyperlipidemia - resume statin when taking PO safely. Overview: Last Assessment & Plan:       Lipids at goal continue current treatment      Elevated troponin (10/10/2020)- ? Silent LCX occlusion with no ECG changes, marked rise in troponin. Denies CP at present and will need LHC at some point but with agitation/AMS at present, this cannot be safely performed and patient stable from a CV standpoint.   Recheck labs in AM, treat infection/PNA and consider LHC later this week if/when mentation improves enough to safely perform the procedure. Add ASA back, convert to RI dosing if unable to safely administer orally. Aspiration pneumonia ([facility] [address] (10/10/2020)- IV ABX per primary MD's      Leukocytosis (10/10/2020)- recheck in AM, see above. Sepsis ([address] (10/11/2020)- IV ABX      Acute delirium (10/11/2020)- still an issue, in 4 point restraints, intermittently agitated, not obeying commands well at present      Hypertensive urgency, malignant (10/11/2020)- slowly titrate meds as tolerated      Acute kidney injury ([address] (10/11/2020)- hydrate as tolerated/needed, recheck BMP in AM        MERCEDEZ Martinez MD  [facility] Cardiology  Pager [phone]

## 2020-10-12 NOTE — PROGRESS NOTES
Problem: Dysphagia (Adult)  Goal: *Acute Goals and Plan of Care (Insert Text)  Outcome: Progressing Towards Goal  Note: LTG: Patient will tolerate least restrictive diet without overt signs or symptoms of airway compromise. STG: Patient will po trials with SLP without overt signs or symptoms of airway compromise. STG: Patient will participate in modified barium swallow study as clinically indicated. SPEECH LANGUAGE PATHOLOGY: DYSPHAGIA- Initial Assessment    NAME/AGE/GENDER: Qing Ghosh is a 72 y.o. male  DATE: 10/12/2020  PRIMARY DIAGNOSIS: Chest pain [R07.9]  Aspiration pneumonia ([facility] [address] [J69.0]  Delirium [R41.0]       ICD-10: Treatment Diagnosis: R13.12 Dysphagia, Oropharyngeal Phase    RECOMMENDATIONS   DIET:    NPO    MEDICATIONS:  when awake/alert critical meds crushed in puree     ASPIRATION PRECAUTIONS  · Slow rate of intake  · Small bites/sips  · Upright at 90 degrees during meal     COMPENSATORY STRATEGIES/MODIFICATIONS  · None     EDUCATION:  · Recommendations discussed with RN, patient, and patient's wife. CONTINUATION OF SKILLED SERVICES/MEDICAL NECESSITY:   Patient is expected to demonstrate progress in  swallow strength, swallow timeliness, swallow function, and swallow safety in order to  improve swallow safety, work toward diet advancement, and decrease aspiration risk.  Patient continues to require skilled intervention due to dysphagia. RECOMMENDATIONS for CONTINUED SPEECH THERAPY:   YES: Anticipate need for ongoing speech therapy during this hospitalization. ASSESSMENT   Patient presents with oral dysphagia and high risk of aspiration due to impaired mental status. Oral dysphagia characterized by impaired acceptance and reduced attention to bolus in oral cavity resulting in oral holding requiring max cueing to prompt swallow. High risk of aspiration at this time due to mentation. Recommend NPO.  Critical meds crushed in puree when awake/alert with verbal prompt to swallow. Will continue to follow for po trials in efforts of identifying safest, least restrictive oral diet. Anticipate tolerance will improve as overall mentation improves. Discussed with wife as she reports giving patient water by straw, despite NPO recommendations. REHABILITATION POTENTIAL FOR STATED GOALS: Good    PLAN    FREQUENCY/DURATION: Continue to follow patient 3 times a week for duration of hospital stay to address above goals. - Recommendations for next treatment session: Next treatment will address po trials    SUBJECTIVE   Upright in bed. Wife at bedside. Drowsy. History of Present Injury/Illness: Mr. Vinh Granado  has a past medical history of AION (acute ischemic optic neuropathy), AION (acute ischemic optic neuropathy), bilateral, Former smoker, stopped smoking in distant past, Hepatitis A (1980), Hypercholesterolemia, Hypertension, Prostate cancer ([facility] [address] (2009), and Wears hearing aid in both ears. . He also  has a past surgical history that includes hx tonsil and adenoidectomy; hx heart catheterization (2011); and hx prostate surgery (11/2016). Problem List:  (Impairments causing functional limitations):  1. Oropharyngeal dysphagia    Previous Dysphagia: NONE REPORTED  Diet Prior to Evaluation: NPO, however wife reports giving patient water by straw. Orientation:   Person    Pain: Pain Scale 1: Numeric (0 - 10)  Pain Intensity 1: 0    Cognitive-Linguistic Screen:  Prior level of function: patient independent living with his wife.  Speech Production:   o Mild dysarthria.  Expressive Language:  o Impaired. May need further assessment.  Receptive Language:  o Impaired. Follows 1-step basic commands with prompting.  Cognition:   o Impaired mentation. Eyes closed but opens to command. May need further assessment if persists. Recommendations: Given results of speech, language, cognitive screening,  cognitive linguistic ability appears to be impaired.   Further assessment is recommended. If persists. OBJECTIVE   Oral Motor:   · Labial: No impairment  · Dentition:  upper dentures (Not placed)  · Oral Hygiene: Adequate  · Lingual: Decreased rate    Swallow evaluation:   Patient consumed trials of ice chips, thin liquids via cup and straw, and puree. Impaired oral acceptance inconsistently requiring verbal cues. Initially opening mouth upon presentation of straw with no attempts to seal, requiring verbal and tactile cues and encouragement to open eyes to improve acceptance. Reduced attention to bolus in oral cavity with episodes of oral holding as evidenced of no swallow upon palpation with both liquids and puree. After each trail, Patient tends to close mouth and state\"ok\" or close and open mouth to accept another bite/sip, despite no swallow upon palpation. Requires verbal prompting/encouragement to swallow each bite/sip. RN present to administer po meds. Presented 1 at a time in puree with verbal prompt to swallow. No overt s/sx airway compromise.      INTERDISCIPLINARY COLLABORATION: Registered Nurse  PRECAUTIONS/ALLERGIES: Hydrocodone; Codeine; and Oxycodone     Tool Used: Dysphagia Outcome and Severity Scale (KELLY)    Score Comments   Normal Diet  [] 7 With no strategies or extra time needed   Functional Swallow  [] 6 May have mild oral or pharyngeal delay   Mild Dysphagia  [] 5 Which may require one diet consistency restricted    Mild-Moderate Dysphagia  [] 4 With 1-2 diet consistencies restricted   Moderate Dysphagia  [] 3 With 2 or more diet consistencies restricted   Moderate-Severe Dysphagia  [] 2 With partial PO strategies (trials with ST only)   Severe Dysphagia  [] 1 With inability to tolerate any PO safely      Score:  Initial:2 Most Recent: 2 (Date 10/12/20 )   Interpretation of Tool: The Dysphagia Outcome and Severity Scale (KELLY) is a simple, easy-to-use, 7-point scale developed to systematically rate the functional severity of dysphagia based on objective assessment and make recommendations for diet level, independence level, and type of nutrition. Current Medications:   No current facility-administered medications on file prior to encounter. Current Outpatient Medications on File Prior to Encounter   Medication Sig Dispense Refill    hydroCHLOROthiazide (HYDRODIURIL) 12.5 mg tablet Take 1 Tab by mouth daily.  HYDROcodone-acetaminophen (NORCO) 7.5-325 mg per tablet       lisinopriL (PRINIVIL, ZESTRIL) 30 mg tablet Take 1 Tab by mouth daily.  meloxicam (MOBIC) 15 mg tablet Hold for surgery-- states not taking      zolpidem (AMBIEN) 10 mg tablet Take 1 Tab by mouth nightly as needed. States not taking      lidocaine (Lidoderm) 5 % Apply patch to the affected area for 12 hours a day and remove for 12 hours a day. 5 Each 0    naproxen (Naprosyn) 500 mg tablet Take 1 Tab by mouth two (2) times daily (with meals) for 10 days. (Patient taking differently: Take 500 mg by mouth two (2) times daily (with meals). Hold for surgery- instructed 10/7/20  2200) 20 Tab 0    atorvastatin (LIPITOR) 40 mg tablet TAKE 1 TABLET BY MOUTH EVERY DAY AT NIGHT (Patient taking differently: Take 40 mg by mouth nightly. TAKE 1 TABLET BY MOUTH EVERY DAY AT NIGHT) 90 Tab 1    omega-3 fatty acids/fish oil (FISH OIL OMEGA 3-6-9 PO) Take  by mouth.          After treatment position/precautions:  · Upright in bed  · RN and patient's wife at bedside  · Call light within reach  · Restraints in place    Total Treatment Duration:   Time In: [address]  Time Out: [address] CCC-SLP

## 2020-10-12 NOTE — PROGRESS NOTES
SPEECH PATHOLOGY NOTE:    Attempted to see patient for dysphagia evaluation. RN reports patient still not appropriate. Will check back at later time.        Bismark Mcfarlane, [facility], [facility] PE[facility] JAMES, CCC-SLP

## 2020-10-12 NOTE — PROGRESS NOTES
PT Note:  PT orders received and chart review initiated. Discussed patient with RN who reports patient not appropriate for PT today. Will hold and attempt another time/day as schedule permits.   A Radha Barger, PT, DPT

## 2020-10-12 NOTE — PROGRESS NOTES
Progress Note    Patient: Waylon Rebollar MRN: [id number]  SSN: [id number]    YOB: 1950  Age: 72 y.o. Sex: male      Admit Date: 10/11/2020    LOS: 1 day     Subjective:     CC: Confusion    This is a 52-year-old male patient who has a past medical history of hypertension and hypercholesterolemia. He underwent shoulder surgery on his right shoulder on 10/9/2020. The same night the patient started having chest pain with radiation towards his left arm and he went to the emergency room at [place]. His EKG did not show evidence of any acute abnormality and a CT scan of the chest was suggestive of possible aspiration pneumonia in the right lower lobe. His white blood cell count was elevated. He was felt to have pneumonia and was started on antibiotics and admitted to the hospital.   Unfortunately he became progressively confused and went into complete delirium. His troponin level kept going up and cardiology was consulted. It was initially felt that he was having a troponin leak however at some point his troponin level was extremely high and it was evident he was having non-STEMI. The patient was transferred to the [facility] on 10/11/2020 to the cardiology floor. He has been seen by cardiology and he has been started on a heparin drip and medical treatment for non-STEMI. An echocardiogram has shown left ventricular ejection fraction of 40 to 45% however the patient does not have evidence of pulmonary or peripheral edema. Left heart catheterization will be done once his mental status improves but until now he remains extremely agitated and in four-point restraints. A CT scan of the head was done on 10/12/2020 which did not show any acute intracranial abnormality.     Objective:     Vitals:    10/12/20 0532 10/12/20 0719 10/12/20 0849 10/12/20 1309   BP: (!) 136/91  135/78 (!) 161/73   Pulse: 97  98 95   Resp: 24 18 20 18   Temp: 99.1 °F (37.3 °C)  99.7 °F (37.6 °C) 98.2 °F (36.8 °C)   SpO2: 96% 92% 99% 96%   Weight: 93.2 kg (205 lb 6.4 oz)           Intake and Output:  Current Shift: 10/12 0701 - 10/12 1900  In: -   Out: 500 [Urine:500]  Last three shifts: 10/10 1901 - 10/12 0700  In: 468.8 [I.V.:468.8]  Out: 2000 [Urine:2000]    Physical Exam:   GENERAL: alert, appears stated age  EYE: conjunctivae/corneas clear. LYMPHATIC: Cervical, supraclavicular, and axillary nodes normal.   THROAT & NECK: normal and no erythema or exudates noted. LUNG: clear to auscultation bilaterally  HEART: regular rate and rhythm, S1, S2 normal, no murmur, click, rub or gallop  ABDOMEN: soft, non-tender. Bowel sounds normal. No masses,  no organomegaly  EXTREMITIES:  extremities normal, atraumatic, no cyanosis or edema  SKIN: Normal.  NEUROLOGIC: Confused, delirious, no focal deficits  PSYCHIATRIC: non focal    Lab/Data Review: All lab results for the last 24 hours reviewed. Assessment:     Principal Problem:    Chest pain (10/10/2020)    Active Problems:    Mixed hyperlipidemia (4/4/2017)      Overview: Last Assessment & Plan:       Lipids at goal continue current treatment      Elevated troponin (10/10/2020)      Aspiration pneumonia ([facility] [address] (10/10/2020)      Leukocytosis (10/10/2020)      Sepsis ([address] (10/11/2020)      Acute delirium (10/11/2020)      Hypertensive urgency, malignant (10/11/2020)      Acute kidney injury ([address] (10/11/2020)      Delirium (10/12/2020)        Plan:     #Non-ST elevation MI. Continue remote telemetry. On a heparin drip. On oral antiplatelet therapy. Continue statins. Cardiology on board. Patient will need a left heart catheterization in the near future. #Acute systolic congestive heart failure. An echocardiogram has revealed an ejection fraction of 40 to 45%. This might be related to his acute ischemic event. No evidence of fluid overload. #Possible aspiration pneumonia. The patient had leukocytosis and an infiltrate seen in his CT scan and admission. Chest x-ray today showing resolution of infiltrate. The patient does not have cough and does not have crackles in his physical exam.  His initial leukocytosis would have been secondary to his MI and stress. I would suggest to complete 5 to 7 days of antibiotics. #Acute delirium. CT scan of the head done today did not show evidence of any acute intracranial abnormality. IV Haldol as needed. Seroquel twice daily. IV Ativan will be used as a last resort for severe agitation. Four-point restraint. Sitter recommended    #Recent surgery of his right shoulder. Unfortunately the patient is pulling his restraints and he is on a heparin drip, for that reason he is at high risk of bleeding from his surgery. #Urinary retention. The patient had a Abreu inserted a couple of days ago. He has mild hematuria which is most likely a reflection of urethral trauma secondary to pulling his Abreu catheter. DVT prophylaxis on a heparin drip.       Signed By: Thom Aguilera MD     October 12, 2020

## 2020-10-12 NOTE — PROGRESS NOTES
CM met with pt's spouse to discuss d/c planning. Pt is currently confused, combative, and in 4 point restraints. Spouse Yuki Batters [phone] is at the bedside. Per spouse they reside in a home and have the following home DME: 3:1 and grab bars in the bathroom. Pending SLT, PT, and OT consults. Per spouse Shakir Lopez is a nurse , is perfectly capable of caring for pt at home, has support, and they do not have any needs at the present time. \"  Per spouse she may elect for pt to have outpatient rehab upon d/c. CM will continue to follow and remain available if needs arise. Care Management Interventions  PCP Verified by CM: Javier Alexander, DO)  Mode of Transport at Discharge:  Other (see comment)(Family)  Transition of Care Consult (CM Consult): Discharge Planning  Discharge Durable Medical Equipment: No  Physical Therapy Consult: Yes  Occupational Therapy Consult: Yes  Speech Therapy Consult: Yes  Current Support Network: Lives with Spouse, Own Home  Confirm Follow Up Transport: Family  The Patient and/or Patient Representative was Provided with a Choice of Provider and Agrees with the Discharge Plan?: No  Freedom of Choice List was Provided with Basic Dialogue that Supports the Patient's Individualized Plan of Care/Goals, Treatment Preferences and Shares the Quality Data Associated with the Providers?: No  [place] Resource Information Provided?: No  Discharge Location  Discharge Placement: Home

## 2020-10-12 NOTE — PROGRESS NOTES
Pt family call and concerned about pt's treatment, Primary provider and cardiology notified that family would like for them to call in regards to the plain for pt care.

## 2020-10-13 LAB
ANION GAP SERPL CALC-SCNC: 9 MMOL/L (ref 7–16)
APTT PPP: 120.2 SEC (ref 24.3–35.4)
APTT PPP: 36.5 SEC (ref 24.3–35.4)
APTT PPP: >200 SEC (ref 24.3–35.4)
BASOPHILS # BLD: 0.1 K/UL (ref 0–0.2)
BASOPHILS NFR BLD: 1 % (ref 0–2)
BUN SERPL-MCNC: 16 MG/DL (ref 8–23)
CALCIUM SERPL-MCNC: 8.9 MG/DL (ref 8.3–10.4)
CHLORIDE SERPL-SCNC: 107 MMOL/L (ref 98–107)
CO2 SERPL-SCNC: 24 MMOL/L (ref 21–32)
CREAT SERPL-MCNC: 1.11 MG/DL (ref 0.8–1.5)
DIFFERENTIAL METHOD BLD: ABNORMAL
EOSINOPHIL # BLD: 0.7 K/UL (ref 0–0.8)
EOSINOPHIL NFR BLD: 5 % (ref 0.5–7.8)
ERYTHROCYTE [DISTWIDTH] IN BLOOD BY AUTOMATED COUNT: 13.1 % (ref 11.9–14.6)
GLUCOSE SERPL-MCNC: 119 MG/DL (ref 65–100)
HCT VFR BLD AUTO: 37.3 % (ref 41.1–50.3)
HGB BLD-MCNC: 12.4 G/DL (ref 13.6–17.2)
IMM GRANULOCYTES # BLD AUTO: 0.1 K/UL (ref 0–0.5)
IMM GRANULOCYTES NFR BLD AUTO: 1 % (ref 0–5)
LYMPHOCYTES # BLD: 2.3 K/UL (ref 0.5–4.6)
LYMPHOCYTES NFR BLD: 17 % (ref 13–44)
MAGNESIUM SERPL-MCNC: 1.8 MG/DL (ref 1.8–2.4)
MCH RBC QN AUTO: 28.5 PG (ref 26.1–32.9)
MCHC RBC AUTO-ENTMCNC: 33.2 G/DL (ref 31.4–35)
MCV RBC AUTO: 85.7 FL (ref 79.6–97.8)
MONOCYTES # BLD: 1.4 K/UL (ref 0.1–1.3)
MONOCYTES NFR BLD: 10 % (ref 4–12)
NEUTS SEG # BLD: 9.5 K/UL (ref 1.7–8.2)
NEUTS SEG NFR BLD: 67 % (ref 43–78)
NRBC # BLD: 0 K/UL (ref 0–0.2)
PLATELET # BLD AUTO: 225 K/UL (ref 150–450)
PMV BLD AUTO: 11.2 FL (ref 9.4–12.3)
POTASSIUM SERPL-SCNC: 3.7 MMOL/L (ref 3.5–5.1)
RBC # BLD AUTO: 4.35 M/UL (ref 4.23–5.6)
SODIUM SERPL-SCNC: 140 MMOL/L (ref 136–145)
WBC # BLD AUTO: 14.1 K/UL (ref 4.3–11.1)

## 2020-10-13 PROCEDURE — 85730 THROMBOPLASTIN TIME PARTIAL: CPT

## 2020-10-13 PROCEDURE — 74011250637 HC RX REV CODE- 250/637: Performed by: INTERNAL MEDICINE

## 2020-10-13 PROCEDURE — 83735 ASSAY OF MAGNESIUM: CPT

## 2020-10-13 PROCEDURE — 80048 BASIC METABOLIC PNL TOTAL CA: CPT

## 2020-10-13 PROCEDURE — 65660000000 HC RM CCU STEPDOWN

## 2020-10-13 PROCEDURE — 97161 PT EVAL LOW COMPLEX 20 MIN: CPT

## 2020-10-13 PROCEDURE — 74011000258 HC RX REV CODE- 258: Performed by: HOSPITALIST

## 2020-10-13 PROCEDURE — 74011250636 HC RX REV CODE- 250/636: Performed by: NURSE PRACTITIONER

## 2020-10-13 PROCEDURE — 2709999900 HC NON-CHARGEABLE SUPPLY

## 2020-10-13 PROCEDURE — 36415 COLL VENOUS BLD VENIPUNCTURE: CPT

## 2020-10-13 PROCEDURE — 74011000250 HC RX REV CODE- 250: Performed by: INTERNAL MEDICINE

## 2020-10-13 PROCEDURE — 74011250636 HC RX REV CODE- 250/636: Performed by: INTERNAL MEDICINE

## 2020-10-13 PROCEDURE — 74011250637 HC RX REV CODE- 250/637: Performed by: HOSPITALIST

## 2020-10-13 PROCEDURE — 74011250636 HC RX REV CODE- 250/636: Performed by: HOSPITALIST

## 2020-10-13 PROCEDURE — 99232 SBSQ HOSP IP/OBS MODERATE 35: CPT | Performed by: INTERNAL MEDICINE

## 2020-10-13 PROCEDURE — 97530 THERAPEUTIC ACTIVITIES: CPT

## 2020-10-13 PROCEDURE — 85025 COMPLETE CBC W/AUTO DIFF WBC: CPT

## 2020-10-13 RX ORDER — CARVEDILOL 12.5 MG/1
12.5 TABLET ORAL 2 TIMES DAILY WITH MEALS
Status: DISCONTINUED | OUTPATIENT
Start: 2020-10-13 | End: 2020-10-16 | Stop reason: HOSPADM

## 2020-10-13 RX ORDER — HEPARIN SODIUM 5000 [USP'U]/ML
35 INJECTION, SOLUTION INTRAVENOUS; SUBCUTANEOUS ONCE
Status: COMPLETED | OUTPATIENT
Start: 2020-10-13 | End: 2020-10-13

## 2020-10-13 RX ADMIN — HEPARIN SODIUM 14 UNITS/KG/HR: 5000 INJECTION, SOLUTION INTRAVENOUS at 06:27

## 2020-10-13 RX ADMIN — HALOPERIDOL LACTATE 2 MG: 5 INJECTION, SOLUTION INTRAMUSCULAR at 00:30

## 2020-10-13 RX ADMIN — PIPERACILLIN SODIUM AND TAZOBACTAM SODIUM 3.38 G: 3; .375 INJECTION, POWDER, LYOPHILIZED, FOR SOLUTION INTRAVENOUS at 08:03

## 2020-10-13 RX ADMIN — PIPERACILLIN SODIUM AND TAZOBACTAM SODIUM 3.38 G: 3; .375 INJECTION, POWDER, LYOPHILIZED, FOR SOLUTION INTRAVENOUS at 17:06

## 2020-10-13 RX ADMIN — HALOPERIDOL LACTATE 2 MG: 5 INJECTION, SOLUTION INTRAMUSCULAR at 14:20

## 2020-10-13 RX ADMIN — Medication 10 ML: at 14:00

## 2020-10-13 RX ADMIN — MORPHINE SULFATE 2 MG: 2 INJECTION, SOLUTION INTRAMUSCULAR; INTRAVENOUS at 12:30

## 2020-10-13 RX ADMIN — HEPARIN SODIUM 15 UNITS/KG/HR: 5000 INJECTION, SOLUTION INTRAVENOUS at 22:34

## 2020-10-13 RX ADMIN — SODIUM CHLORIDE, SODIUM LACTATE, POTASSIUM CHLORIDE, CALCIUM CHLORIDE, AND DEXTROSE MONOHYDRATE 75 ML/HR: 600; 310; 30; 20; 5 INJECTION, SOLUTION INTRAVENOUS at 20:00

## 2020-10-13 RX ADMIN — HEPARIN SODIUM 3200 UNITS: 5000 INJECTION INTRAVENOUS; SUBCUTANEOUS at 07:31

## 2020-10-13 RX ADMIN — LABETALOL HYDROCHLORIDE 20 MG: 5 INJECTION INTRAVENOUS at 00:59

## 2020-10-13 RX ADMIN — PIPERACILLIN SODIUM AND TAZOBACTAM SODIUM 3.38 G: 3; .375 INJECTION, POWDER, LYOPHILIZED, FOR SOLUTION INTRAVENOUS at 00:30

## 2020-10-13 RX ADMIN — CARVEDILOL 12.5 MG: 12.5 TABLET, FILM COATED ORAL at 08:03

## 2020-10-13 RX ADMIN — FAMOTIDINE 20 MG: 10 INJECTION INTRAVENOUS at 22:37

## 2020-10-13 RX ADMIN — ASPIRIN 81 MG: 81 TABLET, CHEWABLE ORAL at 08:03

## 2020-10-13 RX ADMIN — QUETIAPINE FUMARATE 12.5 MG: 25 TABLET, FILM COATED ORAL at 17:33

## 2020-10-13 RX ADMIN — FAMOTIDINE 20 MG: 10 INJECTION INTRAVENOUS at 08:03

## 2020-10-13 RX ADMIN — SODIUM CHLORIDE, SODIUM LACTATE, POTASSIUM CHLORIDE, CALCIUM CHLORIDE, AND DEXTROSE MONOHYDRATE 75 ML/HR: 600; 310; 30; 20; 5 INJECTION, SOLUTION INTRAVENOUS at 07:06

## 2020-10-13 RX ADMIN — MORPHINE SULFATE 2 MG: 2 INJECTION, SOLUTION INTRAMUSCULAR; INTRAVENOUS at 17:06

## 2020-10-13 RX ADMIN — CARVEDILOL 12.5 MG: 12.5 TABLET, FILM COATED ORAL at 17:06

## 2020-10-13 RX ADMIN — Medication 10 ML: at 22:38

## 2020-10-13 RX ADMIN — QUETIAPINE FUMARATE 12.5 MG: 25 TABLET, FILM COATED ORAL at 08:04

## 2020-10-13 RX ADMIN — MORPHINE SULFATE 2 MG: 2 INJECTION, SOLUTION INTRAMUSCULAR; INTRAVENOUS at 08:04

## 2020-10-13 RX ADMIN — ATORVASTATIN CALCIUM 40 MG: 40 TABLET, FILM COATED ORAL at 22:37

## 2020-10-13 NOTE — PROGRESS NOTES
Oriented to name only. Agitated and restless. Hadol 2 mg given IVP, slowly. Will continue to monitor.

## 2020-10-13 NOTE — PROGRESS NOTES
Problem: Mobility Impaired (Adult and Pediatric)  Goal: *Acute Goals and Plan of Care (Insert Text)  Description: LTG:  (1.)Mr. Abarca will move from supine to sit and sit to supine , scoot up and down and roll side to side in flat bed without siderails with  INDEPENDENT within 7 day(s). (2.)Mr. Abarca will perform all functional transfers with  INDEPENDENT using the least restrictive/no device within 7 day(s). (3.)Mr. Abarca will ambulate with  INDEPENDENT for 500+ feet with normal vital sign response with the least restrictive/no device within 7 day(s). (4.)Mr. Abarca will ambulate up/down 1 steps with bilateral  railing with  MODIFIED INDEPENDENCE with no device within 7 day(s). Outcome: Progressing Towards Goal    PHYSICAL THERAPY: Initial Assessment and Daily Note 10/13/2020  INPATIENT: PT Visit Days : 1  R shld s/p rotator cuff repair  [place] / [place] / Product Type: Medicare /       NAME/AGE/GENDER: Barbara Snowden is a 72 y.o. male   PRIMARY DIAGNOSIS: Chest pain [R07.9]  Aspiration pneumonia ([facility] [address] [J69.0]  Delirium [R41.0] Chest pain Chest pain        ICD-10: Treatment Diagnosis:    Other abnormalities of gait and mobility (R26.89)   Precaution/Allergies:  Hydrocodone; Codeine; and Oxycodone      ASSESSMENT:     Mr. Noel Nunez presents supine in bed and very drowsy, does awaken to loud voice but keeps eyes closed. Patient oriented to self, date, and situation. Patient lives with his wife Dashawn Ballard and goes by [employer]". 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 He was admitted due to NSTEMI and had recently undergone R rotator cuff repair on 10/9/20. Patient transitioned to sit with max Ax2. He stood with min Ax2 and able to take few sidesteps at EOB with min HHAx2. Initiated treatment to include standing balance activity while PCT changed sheets, etc.  Patient sat with min A then stood again with min A.   Anticipate that as his agitation/delerium clears, his mobility will progress rapidly. Mr. Daniel Perez would benefit from skilled physical therapy (medically necessary) to address his deficits and maximize his function. This section established at most recent assessment   PROBLEM LIST (Impairments causing functional limitations):  Decreased Transfer Abilities  Decreased Ambulation Ability/Technique  Decreased Balance  Decreased Activity Tolerance   INTERVENTIONS PLANNED: (Benefits and precautions of physical therapy have been discussed with the patient.)  Balance Exercise  Bed Mobility  Gait Training  Therapeutic Activites  Therapeutic Exercise/Strengthening  Transfer Training  education     TREATMENT PLAN: Frequency/Duration: 4 times a week for duration of hospital stay  Rehabilitation Potential For Stated Goals: Excellent     REHAB RECOMMENDATIONS (at time of discharge pending progress):    Placement: It is my opinion, based on this patient's performance to date, that Mr. Daniel Perez may benefit from participating in 1-2 additional therapy sessions in order to continue to assess for rehab potential and then make recommendation for disposition at discharge. Equipment:   None at this time              HISTORY:   History of Present Injury/Illness (Reason for Referral):  Per MD note, \"Patient is a 72years old male with hx of HTN, dyslipidemia, former smoker admitted on 10/10/20 for substernal chest pain radiating to left arm. Pt's EKG was unremarkable for acute ischemic changes but CT chest w contrast showed right posterior lobe infiltrate/consolidation suspicious of aspiration pneumonia. Cardiology was consulted to evaluate the pt in view of chest pain and troponin 35 --> 131 --> 600. I spoke to Dr. Rj Escobar on 10/10, he suggested conservative treatment at this point since slight elevation in troponin could be from sepsis. However, overnight pt's troponin increased to 3153 --> 5116.   Pt is also having acute delirium with behavioral disturbance, he is restless, agitated and violent towards nursing staff. He was requiring 4 point restraints and prn haldol and ativan. He is currently on IV zosyn for aspiration pneumonia. Blood culture negative so far. Echo is still pending since pt refused it yesterday. He is currently at room air. His blood pressure has been elevated overnight along with tachycardia, which could be all from agitation and restlessness, which itself can cause hypertensive emergency resulting in demand ischemia. I have discussed with Dr. Batsheva Pollack from cardiology team, they will reassess him once pt is downtown to makes a decision on cardiac cath and ACS protocol. Pt is at high risk but is hemodynamically stable for transfer to [address] [place] [address]. \"  Past Medical History/Comorbidities:   Mr. Kushal Jennings  has a past medical history of AION (acute ischemic optic neuropathy), AION (acute ischemic optic neuropathy), bilateral, Former smoker, stopped smoking in distant past, Hepatitis A (1980), Hypercholesterolemia, Hypertension, Prostate cancer ([facility] [address] (2009), and Wears hearing aid in both ears. Mr. Kushal Jennings  has a past surgical history that includes hx tonsil and adenoidectomy; hx heart catheterization (2011); and hx prostate surgery (11/2016). Social History/Living Environment:      Prior Level of Function/Work/Activity:  Patient lives with his wife Danielle Bajwa. 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 Number of Personal Factors/Comorbidities that affect the Plan of Care: 3+: HIGH COMPLEXITY   EXAMINATION:   Most Recent Physical Functioning:   Gross Assessment:  AROM: Generally decreased, functional  Strength: Generally decreased, functional               Posture:  Posture (WDL): Exceptions to WDL  Posture Assessment:  Forward head, Rounded shoulders  Balance:  Sitting: Impaired  Sitting - Static: Prop sitting  Sitting - Dynamic: Fair (occasional)  Standing: Impaired  Standing - Static: Fair  Standing - Dynamic : Fair Bed Mobility:  Supine to Sit: Maximum assistance;Assist x2  Sit to Supine: Moderate assistance;Assist x2  Wheelchair Mobility:     Transfers:  Sit to Stand: Minimum assistance;Assist x2  Stand to Sit: Minimum assistance  Gait:     Base of Support: Widened  Speed/Nelda: Slow  Step Length: Left shortened;Right shortened  Distance (ft): 3 Feet (ft)(sidestepping at EOB)  Assistive Device: Other (comment)(HHAx2)  Ambulation - Level of Assistance: Minimal assistance;Assist x2      Body Structures Involved:  Metabolic Body Functions Affected:  Neuromusculoskeletal  Movement Related Activities and Participation Affected: [address], Social and Rowan [place]   Number of elements that affect the Plan of Care: 4+: HIGH COMPLEXITY   CLINICAL PRESENTATION:   Presentation: Evolving clinical presentation with changing clinical characteristics: MODERATE COMPLEXITY   CLINICAL DECISION MAKIN [facility] Inpatient Short Form  How much difficulty does the patient currently have. .. Unable A Lot A Little None   1. Turning over in bed (including adjusting bedclothes, sheets and blankets)? [] 1   [x] 2   [] 3   [] 4   2. Sitting down on and standing up from a chair with arms ( e.g., wheelchair, bedside commode, etc.)   [] 1   [] 2   [x] 3   [] 4   3. Moving from lying on back to sitting on the side of the bed? [] 1   [x] 2   [] 3   [] 4   How much help from another person does the patient currently need. .. Total A Lot A Little None   4. Moving to and from a bed to a chair (including a wheelchair)? [] 1   [] 2   [x] 3   [] 4   5. Need to walk in hospital room? [] 1   [] 2   [x] 3   [] 4   6. Climbing 3-5 steps with a railing? [] 1   [x] 2   [] 3   [] 4   © , Trustees of [address] [place] [address] [address] [id number], under license to [employer].  All rights reserved      Score:  Initial: 15 Most Recent: X (Date: -- )    Interpretation of Tool:  Represents activities that are increasingly more difficult (i.e. Bed mobility, Transfers, Gait). Medical Necessity:     Patient is expected to demonstrate progress in   strength, range of motion, balance, and functional technique   to   increase independence with   and improve safety during all functional mobility  . Reason for Services/Other Comments:  Patient continues to require skilled intervention due to   medical complications and patient unable to attend/participate in therapy as expected  . Use of outcome tool(s) and clinical judgement create a POC that gives a: Clear prediction of patient's progress: LOW COMPLEXITY            TREATMENT:   (In addition to Assessment/Re-Assessment sessions the following treatments were rendered)   Pre-treatment Symptoms/Complaints:  none. Pain: Initial:   Pain Intensity 1: 0  Post Session:  0     Therapeutic Activity: (    23 minutes): Therapeutic activities including sit to stand and standing balance activities at EOB to improve mobility, strength, and balance. Required minimal   to promote static and dynamic balance in standing. Braces/Orthotics/Lines/Etc:   IV  jacobs catheter  monitor   O2 Device: Room air  Treatment/Session Assessment:    Response to Treatment:  pleasant and cooperative. Interdisciplinary Collaboration:   Physical Therapist  Registered Nurse  Rehabilitation Attendant  Certified Nursing Assistant/Patient Care Technician  After treatment position/precautions:   Supine in bed  Bed/Chair-wheels locked  Bed in low position  Call light within reach  RN notified  Sitter at bedside    Compliance with Program/Exercises: Compliant all of the time, Will assess as treatment progresses  Recommendations/Intent for next treatment session: \"Next visit will focus on advancements to more challenging activities and reduction in assistance provided\".   Total Treatment Duration:  PT Patient Time In/Time Out  Time In: 1000  Time Out: 2001 Ron Rm Imer Burden, PT, DPT

## 2020-10-13 NOTE — PROGRESS NOTES
[facility] CARDIOLOGY PROGRESS NOTE    10/13/2020 7:26 AM    Admit Date: 10/11/2020        Subjective:   Stable overnight without angina, CHF, or palpitations. Vitals stable and controlled but intermittently agitated with resultant HTN. Mentation seems to be slowly improving. Subjectively more lucid this AM but still thinks he's at [facility]". Kalani De Souza Sterling Nolvia No acute CP/CAD/rhythm orCHF issues. Tolerating meds well. Objective:      Vitals:    10/13/20 0059 10/13/20 0400 10/13/20 0415 10/13/20 0603   BP: (!) 181/75 (!) 170/76  126/62   Pulse: 75 75  73   Resp:  18  17   Temp:  98.6 °F (37 °C)     SpO2:  96% 96%    Weight:   203 lb 1.6 oz (92.1 kg)        Physical Exam:  Neck- supple, cannot assess JVD  CV- regular rate and rhythm no MRG  Lung- clear bilaterally anterolaterally  Abd- soft, nontender, nondistended  Ext- no edema  Skin- warm and dry    Data Review:   Recent Labs     10/13/20  0455 10/12/20  1915 10/12/20  0352  10/11/20  0351    142  --   --  139   K 3.7 3.6  --   --  3.6   MG 1.8  --   --   --   --    BUN 16 19  --   --  24*   CREA 1.11 1.02  --   --  1.20   * 95  --   --  92   WBC 14.1*  --  13.6*   < > 14.5*   HGB 12.4*  --  12.6*   < > 12.3*   HCT 37.3*  --  36.5*   < > 36.9*     --  231   < > 193   CHOL  --   --   --   --  137   TRIGL  --   --   --   --  142   HDL  --   --   --   --  39*    < > = values in this interval not displayed. Assessment and Plan:     Principal Problem:    Chest pain (10/10/2020)- denies CP at present, see below.      Active Problems:    Mixed hyperlipidemia - resume statin when taking PO safely. Overview: Last Assessment & Plan:       Lipids at goal continue current treatment       Elevated troponin (10/10/2020)- ? Silent LCX occlusion with no ECG changes, marked rise in troponin. Denies CP at present and will need LHC at some point but with agitation/AMS at present, this cannot be safely performed and patient stable from a CV standpoint.   Recheck labs in AM, treat infection/PNA and consider LHC tomorrow AM if/when mentation improves enough to safely perform the procedure. Added ASA back, convert to PA dosing if unable to safely administer orally.        Aspiration pneumonia ([address] (10/10/2020)- IV ABX per primary MD's       Leukocytosis (10/10/2020)- recheck in AM, see above.        Sepsis ([address] (10/11/2020)- IV ABX       Acute delirium (10/11/2020)- still an issue, in 4 point restraints, intermittently agitated, although improving overnight per nursing staff and more lucid this AM compared to yesterday. Try removing restraints this AM if safe from fall standpoint.        Hypertensive urgency, malignant (10/11/2020)- slowly titrate meds as tolerated       Acute kidney injury ([address] (10/11/2020)- hydrate as tolerated/needed, recheck BMP in AM    Recheck labs in AM  Remove restraints slowly  Avoid more sedatives during day today  Sit up and allow to wake up  Tentatively plan C poss tomorrow? ??       A. Elridge Lefort, MD  [facility] Cardiology  Pager [phone]

## 2020-10-13 NOTE — PROGRESS NOTES
Daughter on phone, did state correct security code. Requested update on father's status. Report as on flow-sheet.

## 2020-10-13 NOTE — PROGRESS NOTES
Bedside and Verbal shift change report to be given to Jayson Adair (oncoming nurse) by self (offgoing nurse). Report included the following information SBAR, Kardex, MAR and Recent Results. Heparin gtt verified at bedside, see MAR.

## 2020-10-13 NOTE — PROGRESS NOTES
Hospitalist Note     Admit Date:  10/11/2020  9:15 AM   Name:  Baylee Smith   Age:  72 y.o.  :  1950   MRN:  [id number]   PCP:  Mary Levi DO  Treatment Team: Attending Provider: Blanche oRque MD; Consulting Provider: Ivan Villasenor DO; Utilization Review: Nettie English; Speech Language Pathologist: Emerita Zamorano, SLP; Consulting Provider: Ap Brown MD    HPI/Subjective:       Mr. Marco Barger is a 71 yo male with PMH of HTN, HLP, and 10-09-20 arthroscopic rotator cuff repair who is admitted with acute metabolic encephalopathy, pneumonia and NSTEMI. He has been agitated and required seroquel, prn haldol, restraints and sitter to bedside. CT head no acute issues. He has been covered for pneumonia with zosyn, CXR showed right base opacity. BC NGTD. UA not available. Cardiology following for NSTEMI and pending [facility] if mentation improves. ECHO EF 40-45% and coreg added. Discharge plans pending.        10-13-20 wife present, states he ate ok and is 100% improved from yesterday, more clear mentaiton, in mitts and sleeping      Objective:     Patient Vitals for the past 24 hrs:   Temp Pulse Resp BP SpO2   10/13/20 1253 97.4 °F (36.3 °C) 61 18 (!) 154/76 95 %   10/13/20 1125  61 18 117/64 99 %   10/13/20 0927  72 20 (!) 126/59 98 %   10/13/20 0840 98.9 °F (37.2 °C) 76 18 (!) 124/57 97 %   10/13/20 0727 98.4 °F (36.9 °C) 75 18 131/62 98 %   10/13/20 0603  73 17 126/62    10/13/20 0415     96 %   10/13/20 0400 98.6 °F (37 °C) 75 18 (!) 170/76 96 %   10/13/20 0059  75  (!) 181/75    10/13/20 0000 98.7 °F (37.1 °C) 81 18 (!) 156/80 96 %   10/12/20 2021 97.6 °F (36.4 °C) 78 18 (!) 140/69 96 %   10/12/20 2000  78      10/12/20 1923   18 (!) 149/69 96 %   10/12/20 183 99.2 °F (37.3 °C) 95 20 (!) 192/95 95 %     Oxygen Therapy  O2 Sat (%): 95 % (10/13/20 1253)  Pulse via Oximetry: 96 beats per minute (10/12/20 0719)  O2 Device: Room air (10/13/20 1642)  O2 Flow Rate (L/min): 0 l/min (10/12/20 0719)    Estimated body mass index is 29.99 kg/m² as calculated from the following:    Height as of 10/10/20: 5' 9\" (1.753 m). Weight as of this encounter: 92.1 kg (203 lb 1.6 oz). Intake/Output Summary (Last 24 hours) at 10/13/2020 1654  Last data filed at 10/13/2020 1027  Gross per 24 hour   Intake 1150 ml   Output 1251 ml   Net -101 ml       *Note that automatically entered I/Os may not be accurate; dependent on patient compliance with collection and accurate  by techs. General:    Elderly, sedated, no distress    CV:   RRR. No murmur, rub, or gallop. no edema  Lungs:   Crackles at bases  Abdomen:   Soft, nontender, nondistended. Extremities: Warm and dry  Skin:     No rashes or jaundice.    Neuro:  sedated    Data Reviewed:  I have reviewed all labs, meds, and studies from the last 24 hours:  Recent Results (from the past 24 hour(s))   PTT    Collection Time: 10/12/20  7:15 PM   Result Value Ref Range    aPTT 104.9 (H) 24.3 - 36.1 SEC   METABOLIC PANEL, BASIC    Collection Time: 10/12/20  7:15 PM   Result Value Ref Range    Sodium 142 136 - 145 mmol/L    Potassium 3.6 3.5 - 5.1 mmol/L    Chloride 109 (H) 98 - 107 mmol/L    CO2 23 21 - 32 mmol/L    Anion gap 10 7 - 16 mmol/L    Glucose 95 65 - 100 mg/dL    BUN 19 8 - 23 MG/DL    Creatinine 1.02 0.8 - 1.5 MG/DL    GFR est AA >60 >60 ml/min/1.73m2    GFR est non-AA >60 >60 ml/min/1.73m2    Calcium 8.3 8.3 - 00.9 MG/DL   METABOLIC PANEL, BASIC    Collection Time: 10/13/20  4:55 AM   Result Value Ref Range    Sodium 140 136 - 145 mmol/L    Potassium 3.7 3.5 - 5.1 mmol/L    Chloride 107 98 - 107 mmol/L    CO2 24 21 - 32 mmol/L    Anion gap 9 7 - 16 mmol/L    Glucose 119 (H) 65 - 100 mg/dL    BUN 16 8 - 23 MG/DL    Creatinine 1.11 0.8 - 1.5 MG/DL    GFR est AA >60 >60 ml/min/1.73m2    GFR est non-AA >60 >60 ml/min/1.73m2    Calcium 8.9 8.3 - 10.4 MG/DL   MAGNESIUM    Collection Time: 10/13/20  4:55 AM   Result Value Ref Range    Magnesium 1.8 1.8 - 2.4 mg/dL   CBC WITH AUTOMATED DIFF    Collection Time: 10/13/20  4:55 AM   Result Value Ref Range    WBC 14.1 (H) 4.3 - 11.1 K/uL    RBC 4.35 4.23 - 5.6 M/uL    HGB 12.4 (L) 13.6 - 17.2 g/dL    HCT 37.3 (L) 41.1 - 50.3 %    MCV 85.7 79.6 - 97.8 FL    MCH 28.5 26.1 - 32.9 PG    MCHC 33.2 31.4 - 35.0 g/dL    RDW 13.1 11.9 - 14.6 %    PLATELET 528 906 - 847 K/uL    MPV 11.2 9.4 - 12.3 FL    ABSOLUTE NRBC 0.00 0.0 - 0.2 K/uL    DF AUTOMATED      NEUTROPHILS 67 43 - 78 %    LYMPHOCYTES 17 13 - 44 %    MONOCYTES 10 4.0 - 12.0 %    EOSINOPHILS 5 0.5 - 7.8 %    BASOPHILS 1 0.0 - 2.0 %    IMMATURE GRANULOCYTES 1 0.0 - 5.0 %    ABS. NEUTROPHILS 9.5 (H) 1.7 - 8.2 K/UL    ABS. LYMPHOCYTES 2.3 0.5 - 4.6 K/UL    ABS. MONOCYTES 1.4 (H) 0.1 - 1.3 K/UL    ABS. EOSINOPHILS 0.7 0.0 - 0.8 K/UL    ABS. BASOPHILS 0.1 0.0 - 0.2 K/UL    ABS. IMM.  GRANS. 0.1 0.0 - 0.5 K/UL   PTT    Collection Time: 10/13/20  4:55 AM   Result Value Ref Range    aPTT 36.5 (H) 24.3 - 35.4 SEC   PTT    Collection Time: 10/13/20  1:05 PM   Result Value Ref Range    aPTT >200.0 (HH) 24.3 - 35.4 SEC        Current Meds:  Current Facility-Administered Medications   Medication Dose Route Frequency    carvediloL (COREG) tablet 12.5 mg  12.5 mg Oral BID WITH MEALS    haloperidol lactate (HALDOL) injection 2 mg  2 mg IntraVENous Q6H PRN    haloperidol lactate (HALDOL) injection 4 mg  4 mg IntraMUSCular Q6H PRN    famotidine (PF) (PEPCID) 20 mg in 0.9% sodium chloride 10 mL injection  20 mg IntraVENous Q12H    dextrose 5% lactated ringers infusion  75 mL/hr IntraVENous CONTINUOUS    acetaminophen (TYLENOL) tablet 650 mg  650 mg Oral Q6H PRN    QUEtiapine (SEROquel) tablet 12.5 mg  12.5 mg Oral BID    ondansetron (ZOFRAN) injection 4 mg  4 mg IntraVENous Q6H PRN    aspirin chewable tablet 81 mg  81 mg Oral DAILY    morphine injection 2 mg  2 mg IntraVENous Q4H PRN    nitroglycerin (NITROSTAT) tablet 0.4 mg  0.4 mg SubLINGual Q5MIN PRN    sodium chloride (NS) flush 5-40 mL  5-40 mL IntraVENous Q8H    sodium chloride (NS) flush 5-40 mL  5-40 mL IntraVENous PRN    atorvastatin (LIPITOR) tablet 40 mg  40 mg Oral QHS    piperacillin-tazobactam (ZOSYN) 3.375 g in 0.9% sodium chloride (MBP/ADV) 100 mL  3.375 g IntraVENous Q8H    LORazepam (ATIVAN) injection 1 mg  1 mg IntraVENous Q4H PRN    metoprolol (LOPRESSOR) injection 5 mg  5 mg IntraVENous Q6H PRN    labetaloL (NORMODYNE;TRANDATE) injection 20 mg  20 mg IntraVENous Q4H PRN    heparin 25,000 units in dextrose 500 mL infusion  12-25 Units/kg/hr IntraVENous TITRATE    acetaminophen (TYLENOL) suppository 650 mg  650 mg Rectal Q6H PRN       Other Studies:  Results for orders placed or performed during the hospital encounter of 10/11/20   2D ECHO COMPLETE ADULT (TTE) W OR [address] [facility] 140[address] [facility], [facility] [address] [facility]  [phone]    Transthoracic Echocardiogram  2D, M-mode, Doppler, and Color Doppler    Patient: Rox Haro  MR #: [id number]  : 1950  Age: 72 years  Gender: Male  Study date: 11-Oct-2020  Account #: [de-identified]  Height: 69 in  Weight: 214.5 lb  BSA: 2.13 mï¾²  Status:Routine  Location: [facility]  BP: 136/ 51    Allergies: HYDROCODONE, CODEINE, OXYCODONE    Sonographer:  Arpit Ruiz [facility]  Group:  [facility]  Referring Physician:  Pavel Hidalgo MD  Reading Physician:  DR. IRMA MARTIN DO    INDICATIONS: Chest pain with elevated troponin    PROCEDURE: This was a routine study. A transthoracic echocardiogram was  performed. The study included limited 2D imaging, M-mode, limited spectral  Doppler, and color Doppler. Intravenous contrast (Definity) was administered. Echocardiographic views were limited by poor patient compliance. This was a  technically difficult study. LEFT VENTRICLE: Systolic function was mildly reduced. Ejection fraction was  estimated in the range of 40 % to 45 %.  This study was inadequate for the  evaluation of regional wall motion. AORTA: The ascending aorta was normal in size. SUMMARY:    -  Procedure information: This was a technically difficult study. Echocardiographic views were limited by poor patient compliance. -  Left ventricle: Systolic function was mildly reduced. Ejection fraction   was  estimated in the range of 40 % to 45 %. This study was inadequate for the  evaluation of regional wall motion. SYSTEM MEASUREMENT TABLES    2D  Ao Diam: 3.2 cm  LA Diam: 4.4 cm  %FS: 28.4 %  IVSd: 0.8 cm  LVIDd: 4.7 cm  LVIDs: 3.3 cm  LVOT Diam: 2.1 cm  LVPWd: 0.9 cm    Prepared and signed by     [id number] [place] [address], DO  Signed 11-Oct-2020 14:15:13         No results found.     All Micro Results     None          SARS-CoV-2 Lab Results  \"Novel Coronavirus\" Test: No results found for: COV2NT   \"Emergent Disease\" Test: No results found for: EDPR  \"SARS-COV-2\" Test: No results found for: XGCOVT  Rapid Test: No results found for: COVR         Assessment and Plan:     Hospital Problems as of 10/13/2020 Date Reviewed: 12/30/2019          Codes Class Noted - Resolved POA    Delirium ICD-10-CM: R41.0  ICD-9-CM: 780.09  10/12/2020 - Present Unknown        Sepsis ([facility] ICD-10-CM: A41.9  ICD-9-CM: 038.9, 995.91  10/11/2020 - Present Yes        Acute delirium ICD-10-CM: R41.0  ICD-9-CM: 780.09  10/11/2020 - Present Yes        Hypertensive urgency, malignant ICD-10-CM: I16.0  ICD-9-CM: 401.0  10/11/2020 - Present Unknown        Acute kidney injury ([facility] ICD-10-CM: N17.9  ICD-9-CM: 584.9  10/11/2020 - Present Unknown        * (Principal) Chest pain ICD-10-CM: R07.9  ICD-9-CM: 786.50  10/10/2020 - Present Yes        Elevated troponin ICD-10-CM: R77.8  ICD-9-CM: 790.6  10/10/2020 - Present Yes        Aspiration pneumonia ([facility] ICD-10-CM: J69.0  ICD-9-CM: 507.0  10/10/2020 - Present Yes        Leukocytosis ICD-10-CM: X35.538  ICD-9-CM: 288.60  10/10/2020 - Present Yes        Mixed hyperlipidemia ICD-10-CM: E78.2  ICD-9-CM: 272.2  4/4/2017 - Present Yes    Overview Signed 4/18/2018  8:47 AM by Ana Silver MD     Last Assessment & Plan:   Lipids at goal continue current treatment                   Plan:  · Acute metabolic encephalopathy:  · Improving  · followup mentation  · Haldol prn and scheduled seroquel  · Sitter present      · Pneumonia:  · Zosyn D 3      · NSTEMI:  · Cardiology following   · Pending C  · IV heparin, asa, lipitor, coreg      · Right shoulder rotator cuff repair:  · Wife requesting ortho consult that is placed       · Hematuria:  · Possibly related to jacobs trauma  · followup      DC planning/Dispo:  pending      Diet:  DIET GI SOFT  DVT ppx:  IV heparin    Signed:  Jer Tompkins MD

## 2020-10-13 NOTE — WOUND CARE
Consult for right shoulder, patient had recent shoulder surgery from Dr. Pauline Heck Friday of last week, then had to be brought to the ER per spouses report. Right should with intact foam dressing, clean dry intact, this foam is Allevyn brand and is common post-op bandage, assumed this is the original post op dressing, spouse confirmed. Call to Dr. Mervin Carmichael nurse who stated normal procedure is to tell the patient to remove the foam bandages on the 3rd post op day and leave the steri strips in place and open to air. Normal bathing is ok and allow steri strips to come off naturally or they will be removed at the post op appointment which is to be 10/22/20 at 345pm. Notified assistant that patient may need rehab after this hospitalization. Assistant stated she would notify Dr. Pauline Heck. Noted ortho has been consulted, likely for this same reason, defer to orthopedic care. Will monitor loosely during hospital stay.

## 2020-10-13 NOTE — PROGRESS NOTES
SPEECH PATHOLOGY NOTE:    Attempted to see patient for po trials. Initial evaluation 10/12 with recommendation of NPO. Per wife, patient ate lunch and she denies any difficulty. Discussed risk of aspiration with patient's altered mentation/ decreased alertness, however she requested ST not wake patient. Will check back at later date.          [address] CCC-SLP

## 2020-10-13 NOTE — PROGRESS NOTES
Jacky Johnson, Wife on telephone, stated security code, was given update, see flow sheet, patient still very restless, pulling on restraints, oriented to name only. Wife requesting medicate  with Morphine for pain, and relax him.

## 2020-10-13 NOTE — PROGRESS NOTES
Bedside and Verbal shift change report to be given to self (oncoming nurse) by Mirna Fleischer (offgoing nurse). Report included the following information SBAR, Kardex, MAR and Recent Results. Heparin gtt verified and rate changed at bedside. See [facility]

## 2020-10-14 LAB
ANION GAP SERPL CALC-SCNC: 6 MMOL/L (ref 7–16)
APTT PPP: 97.6 SEC (ref 24.3–35.4)
BUN SERPL-MCNC: 10 MG/DL (ref 8–23)
CALCIUM SERPL-MCNC: 8.8 MG/DL (ref 8.3–10.4)
CHLORIDE SERPL-SCNC: 108 MMOL/L (ref 98–107)
CO2 SERPL-SCNC: 26 MMOL/L (ref 21–32)
CREAT SERPL-MCNC: 0.96 MG/DL (ref 0.8–1.5)
ERYTHROCYTE [DISTWIDTH] IN BLOOD BY AUTOMATED COUNT: 13.3 % (ref 11.9–14.6)
GLUCOSE BLD STRIP.AUTO-MCNC: 111 MG/DL (ref 65–100)
GLUCOSE SERPL-MCNC: 119 MG/DL (ref 65–100)
HCT VFR BLD AUTO: 36 % (ref 41.1–50.3)
HGB BLD-MCNC: 12.2 G/DL (ref 13.6–17.2)
MAGNESIUM SERPL-MCNC: 1.8 MG/DL (ref 1.8–2.4)
MCH RBC QN AUTO: 29 PG (ref 26.1–32.9)
MCHC RBC AUTO-ENTMCNC: 33.9 G/DL (ref 31.4–35)
MCV RBC AUTO: 85.5 FL (ref 79.6–97.8)
NRBC # BLD: 0 K/UL (ref 0–0.2)
PLATELET # BLD AUTO: 215 K/UL (ref 150–450)
PMV BLD AUTO: 11.2 FL (ref 9.4–12.3)
POTASSIUM SERPL-SCNC: 3.4 MMOL/L (ref 3.5–5.1)
RBC # BLD AUTO: 4.21 M/UL (ref 4.23–5.6)
SODIUM SERPL-SCNC: 140 MMOL/L (ref 136–145)
UFH PPP CHRO-ACNC: 0.45 IU/ML (ref 0.3–0.7)
WBC # BLD AUTO: 12.1 K/UL (ref 4.3–11.1)

## 2020-10-14 PROCEDURE — 82962 GLUCOSE BLOOD TEST: CPT

## 2020-10-14 PROCEDURE — 74011250637 HC RX REV CODE- 250/637: Performed by: INTERNAL MEDICINE

## 2020-10-14 PROCEDURE — 85027 COMPLETE CBC AUTOMATED: CPT

## 2020-10-14 PROCEDURE — 85520 HEPARIN ASSAY: CPT

## 2020-10-14 PROCEDURE — 65660000000 HC RM CCU STEPDOWN

## 2020-10-14 PROCEDURE — 77010033678 HC OXYGEN DAILY

## 2020-10-14 PROCEDURE — 74011000636 HC RX REV CODE- 636: Performed by: INTERNAL MEDICINE

## 2020-10-14 PROCEDURE — 74011250636 HC RX REV CODE- 250/636: Performed by: INTERNAL MEDICINE

## 2020-10-14 PROCEDURE — 93458 L HRT ARTERY/VENTRICLE ANGIO: CPT | Performed by: INTERNAL MEDICINE

## 2020-10-14 PROCEDURE — 83735 ASSAY OF MAGNESIUM: CPT

## 2020-10-14 PROCEDURE — 97165 OT EVAL LOW COMPLEX 30 MIN: CPT

## 2020-10-14 PROCEDURE — 36415 COLL VENOUS BLD VENIPUNCTURE: CPT

## 2020-10-14 PROCEDURE — 93458 L HRT ARTERY/VENTRICLE ANGIO: CPT

## 2020-10-14 PROCEDURE — 74011000250 HC RX REV CODE- 250: Performed by: INTERNAL MEDICINE

## 2020-10-14 PROCEDURE — 85730 THROMBOPLASTIN TIME PARTIAL: CPT

## 2020-10-14 PROCEDURE — 80048 BASIC METABOLIC PNL TOTAL CA: CPT

## 2020-10-14 PROCEDURE — B2151ZZ FLUOROSCOPY OF LEFT HEART USING LOW OSMOLAR CONTRAST: ICD-10-PCS | Performed by: INTERNAL MEDICINE

## 2020-10-14 PROCEDURE — 99024 POSTOP FOLLOW-UP VISIT: CPT | Performed by: INTERNAL MEDICINE

## 2020-10-14 PROCEDURE — 74011250636 HC RX REV CODE- 250/636: Performed by: HOSPITALIST

## 2020-10-14 PROCEDURE — 2709999900 HC NON-CHARGEABLE SUPPLY

## 2020-10-14 PROCEDURE — C1769 GUIDE WIRE: HCPCS

## 2020-10-14 PROCEDURE — 74011250637 HC RX REV CODE- 250/637: Performed by: HOSPITALIST

## 2020-10-14 PROCEDURE — 94760 N-INVAS EAR/PLS OXIMETRY 1: CPT

## 2020-10-14 PROCEDURE — C1894 INTRO/SHEATH, NON-LASER: HCPCS

## 2020-10-14 PROCEDURE — 97112 NEUROMUSCULAR REEDUCATION: CPT

## 2020-10-14 PROCEDURE — B2111ZZ FLUOROSCOPY OF MULTIPLE CORONARY ARTERIES USING LOW OSMOLAR CONTRAST: ICD-10-PCS | Performed by: INTERNAL MEDICINE

## 2020-10-14 PROCEDURE — 74011000258 HC RX REV CODE- 258: Performed by: HOSPITALIST

## 2020-10-14 PROCEDURE — 77030016699 HC CATH ANGI DX INFN1 CARD -A

## 2020-10-14 PROCEDURE — 77030015766

## 2020-10-14 PROCEDURE — 77030029997 HC DEV COM RDL R BND TELE -B

## 2020-10-14 PROCEDURE — 4A023N7 MEASUREMENT OF CARDIAC SAMPLING AND PRESSURE, LEFT HEART, PERCUTANEOUS APPROACH: ICD-10-PCS | Performed by: INTERNAL MEDICINE

## 2020-10-14 RX ORDER — SODIUM CHLORIDE 0.9 % (FLUSH) 0.9 %
5-40 SYRINGE (ML) INJECTION EVERY 8 HOURS
Status: DISCONTINUED | OUTPATIENT
Start: 2020-10-14 | End: 2020-10-15

## 2020-10-14 RX ORDER — LISINOPRIL 5 MG/1
5 TABLET ORAL DAILY
Status: DISCONTINUED | OUTPATIENT
Start: 2020-10-14 | End: 2020-10-15

## 2020-10-14 RX ORDER — HEPARIN SODIUM 5000 [USP'U]/100ML
12-25 INJECTION, SOLUTION INTRAVENOUS
Status: DISCONTINUED | OUTPATIENT
Start: 2020-10-14 | End: 2020-10-16

## 2020-10-14 RX ORDER — SODIUM CHLORIDE 0.9 % (FLUSH) 0.9 %
5-40 SYRINGE (ML) INJECTION AS NEEDED
Status: DISCONTINUED | OUTPATIENT
Start: 2020-10-14 | End: 2020-10-15

## 2020-10-14 RX ORDER — MIDAZOLAM HYDROCHLORIDE 1 MG/ML
.5-2 INJECTION, SOLUTION INTRAMUSCULAR; INTRAVENOUS
Status: DISCONTINUED | OUTPATIENT
Start: 2020-10-14 | End: 2020-10-15

## 2020-10-14 RX ORDER — MAGNESIUM SULFATE HEPTAHYDRATE 40 MG/ML
2 INJECTION, SOLUTION INTRAVENOUS ONCE
Status: COMPLETED | OUTPATIENT
Start: 2020-10-14 | End: 2020-10-14

## 2020-10-14 RX ORDER — FENTANYL CITRATE 50 UG/ML
25-50 INJECTION, SOLUTION INTRAMUSCULAR; INTRAVENOUS
Status: DISCONTINUED | OUTPATIENT
Start: 2020-10-14 | End: 2020-10-15

## 2020-10-14 RX ORDER — POTASSIUM CHLORIDE 20 MEQ/1
40 TABLET, EXTENDED RELEASE ORAL
Status: COMPLETED | OUTPATIENT
Start: 2020-10-14 | End: 2020-10-14

## 2020-10-14 RX ORDER — HEPARIN SODIUM 200 [USP'U]/100ML
2 INJECTION, SOLUTION INTRAVENOUS CONTINUOUS
Status: DISCONTINUED | OUTPATIENT
Start: 2020-10-14 | End: 2020-10-15

## 2020-10-14 RX ORDER — SODIUM CHLORIDE 9 MG/ML
75 INJECTION, SOLUTION INTRAVENOUS CONTINUOUS
Status: DISCONTINUED | OUTPATIENT
Start: 2020-10-14 | End: 2020-10-15

## 2020-10-14 RX ORDER — LIDOCAINE HYDROCHLORIDE 10 MG/ML
1-5 INJECTION, SOLUTION EPIDURAL; INFILTRATION; INTRACAUDAL; PERINEURAL ONCE
Status: COMPLETED | OUTPATIENT
Start: 2020-10-14 | End: 2020-10-14

## 2020-10-14 RX ADMIN — ATORVASTATIN CALCIUM 40 MG: 40 TABLET, FILM COATED ORAL at 21:56

## 2020-10-14 RX ADMIN — Medication 10 ML: at 01:11

## 2020-10-14 RX ADMIN — HEPARIN SODIUM 12 UNITS/KG/HR: 5000 INJECTION, SOLUTION INTRAVENOUS at 21:33

## 2020-10-14 RX ADMIN — HEPARIN SODIUM 2 ML: 10000 INJECTION, SOLUTION INTRAVENOUS; SUBCUTANEOUS at 14:15

## 2020-10-14 RX ADMIN — FAMOTIDINE 20 MG: 10 INJECTION INTRAVENOUS at 21:55

## 2020-10-14 RX ADMIN — PIPERACILLIN SODIUM AND TAZOBACTAM SODIUM 3.38 G: 3; .375 INJECTION, POWDER, LYOPHILIZED, FOR SOLUTION INTRAVENOUS at 08:58

## 2020-10-14 RX ADMIN — MORPHINE SULFATE 2 MG: 2 INJECTION, SOLUTION INTRAMUSCULAR; INTRAVENOUS at 08:58

## 2020-10-14 RX ADMIN — LIDOCAINE HYDROCHLORIDE 3 ML: 10 INJECTION, SOLUTION EPIDURAL; INFILTRATION; INTRACAUDAL; PERINEURAL at 14:15

## 2020-10-14 RX ADMIN — IOPAMIDOL 100 ML: 755 INJECTION, SOLUTION INTRAVENOUS at 14:32

## 2020-10-14 RX ADMIN — Medication 10 ML: at 21:49

## 2020-10-14 RX ADMIN — FAMOTIDINE 20 MG: 10 INJECTION INTRAVENOUS at 08:59

## 2020-10-14 RX ADMIN — ASPIRIN 81 MG: 81 TABLET, CHEWABLE ORAL at 08:58

## 2020-10-14 RX ADMIN — LISINOPRIL 5 MG: 5 TABLET ORAL at 16:18

## 2020-10-14 RX ADMIN — CARVEDILOL 12.5 MG: 12.5 TABLET, FILM COATED ORAL at 16:18

## 2020-10-14 RX ADMIN — MAGNESIUM SULFATE HEPTAHYDRATE 2 G: 40 INJECTION, SOLUTION INTRAVENOUS at 09:30

## 2020-10-14 RX ADMIN — Medication 10 ML: at 16:23

## 2020-10-14 RX ADMIN — QUETIAPINE FUMARATE 12.5 MG: 25 TABLET, FILM COATED ORAL at 18:00

## 2020-10-14 RX ADMIN — POTASSIUM CHLORIDE 40 MEQ: 20 TABLET, EXTENDED RELEASE ORAL at 09:30

## 2020-10-14 RX ADMIN — CARVEDILOL 12.5 MG: 12.5 TABLET, FILM COATED ORAL at 08:59

## 2020-10-14 RX ADMIN — QUETIAPINE FUMARATE 12.5 MG: 25 TABLET, FILM COATED ORAL at 08:59

## 2020-10-14 RX ADMIN — PIPERACILLIN SODIUM AND TAZOBACTAM SODIUM 3.38 G: 3; .375 INJECTION, POWDER, LYOPHILIZED, FOR SOLUTION INTRAVENOUS at 16:18

## 2020-10-14 RX ADMIN — PIPERACILLIN SODIUM AND TAZOBACTAM SODIUM 3.38 G: 3; .375 INJECTION, POWDER, LYOPHILIZED, FOR SOLUTION INTRAVENOUS at 00:34

## 2020-10-14 RX ADMIN — Medication 10 ML: at 14:00

## 2020-10-14 RX ADMIN — HEPARIN SODIUM 2 UNITS/HR: 200 INJECTION, SOLUTION INTRAVENOUS at 14:14

## 2020-10-14 RX ADMIN — MORPHINE SULFATE 2 MG: 2 INJECTION, SOLUTION INTRAMUSCULAR; INTRAVENOUS at 13:11

## 2020-10-14 RX ADMIN — MORPHINE SULFATE 2 MG: 2 INJECTION, SOLUTION INTRAMUSCULAR; INTRAVENOUS at 01:11

## 2020-10-14 NOTE — PROGRESS NOTES
Bedside shift change report to be given to Lassalle Comunidad (oncoming nurse) by self (offgoing nurse). Report included the following information SBAR, Kardex, MAR and Recent Results. Passed along that heparin gtt is to be restarted at 80.

## 2020-10-14 NOTE — PROGRESS NOTES
PT Note:  Patient off the floor- in CCL? Will attempt another time/day as schedule permits.   A Jenny White, PT, DPT

## 2020-10-14 NOTE — PROGRESS NOTES
Hospitalist Note     Admit Date:  10/11/2020  9:15 AM   Name:  Colletta Skelton   Age:  72 y.o.  :  1950   MRN:  [id number]   PCP:  Maxx Hinkle DO  Treatment Team: Attending Provider: Sondra Carbone MD; Consulting Provider: Rajan Nur DO; Utilization Review: Caitlin Holguin; Speech Language Pathologist: Valentina Gregory, DENNIS; Consulting Provider: Bernardo Root MD; Physical Therapist: Rachel Dumas, PT, DPT; Occupational Therapist: Nehal Palmer OT; Care Manager: Alexis Daly RN    HPI/Subjective:       Mr. Viji Olivarez is a 73 yo male with PMH of HTN, HLP, and 10-09-20 arthroscopic rotator cuff repair who is admitted with acute metabolic encephalopathy, pneumonia and NSTEMI. He has been agitated and required seroquel, prn haldol, restraints and sitter to bedside. CT head no acute issues. He has been covered for pneumonia with zosyn, CXR showed right base opacity. BC NGTD. UA not available. Cardiology following for NSTEMI and pending [facility] if mentation improves. ECHO EF 40-45% and coreg added. Discharge plans pending. 10-14-20  Up to bedside, working with OT, still confused       Objective:     Patient Vitals for the past 24 hrs:   Temp Pulse Resp BP SpO2   10/14/20 0842 97.9 °F (36.6 °C) 75 18 (!) 177/86 97 %   10/14/20 0540 98.2 °F (36.8 °C) 78 18 (!) 167/79 91 %   10/14/20 0032 98.3 °F (36.8 °C) 81 18 (!) 152/57 94 %   10/13/20 2133 97.8 °F (36.6 °C) 67 18 (!) 101/33 94 %   10/13/20 1930     95 %   10/13/20 1658 98.6 °F (37 °C) 72 18 (!) 149/60 95 %   10/13/20 1253 97.4 °F (36.3 °C) 61 18 (!) 154/76 95 %     Oxygen Therapy  O2 Sat (%): 97 % (10/14/20 0842)  Pulse via Oximetry: 96 beats per minute (10/12/20 0719)  O2 Device: Room air (10/14/20 0858)  O2 Flow Rate (L/min): 0 l/min (10/12/20 0719)    Estimated body mass index is 30.58 kg/m² as calculated from the following:    Height as of 10/10/20: 5' 9\" (1.753 m).     Weight as of this encounter: 93.9 kg (207 lb 1.6 oz). Intake/Output Summary (Last 24 hours) at 10/14/2020 1140  Last data filed at 10/14/2020 0845  Gross per 24 hour   Intake 200 ml   Output 1328 ml   Net -1128 ml       *Note that automatically entered I/Os may not be accurate; dependent on patient compliance with collection and accurate  by techs. General:    Elderly, no distress, alert and verbal  CV:   RRR. No murmur, rub, or gallop, trace  edema  Lungs:   Clear   Abdomen:   Soft, nontender, nondistended. Decreased BS  Extremities: Warm and dry  Skin:     No rashes or jaundice.    Neuro:  alert    Data Reviewed:  I have reviewed all labs, meds, and studies from the last 24 hours:  Recent Results (from the past 24 hour(s))   PTT    Collection Time: 10/13/20  1:05 PM   Result Value Ref Range    aPTT >200.0 (HH) 24.3 - 35.4 SEC   PTT    Collection Time: 10/13/20 11:02 PM   Result Value Ref Range    aPTT 120.2 (H) 24.3 - 35.4 SEC   PTT    Collection Time: 10/14/20  4:44 AM   Result Value Ref Range    aPTT 97.6 (H) 24.3 - 89.2 SEC   METABOLIC PANEL, BASIC    Collection Time: 10/14/20  4:44 AM   Result Value Ref Range    Sodium 140 136 - 145 mmol/L    Potassium 3.4 (L) 3.5 - 5.1 mmol/L    Chloride 108 (H) 98 - 107 mmol/L    CO2 26 21 - 32 mmol/L    Anion gap 6 (L) 7 - 16 mmol/L    Glucose 119 (H) 65 - 100 mg/dL    BUN 10 8 - 23 MG/DL    Creatinine 0.96 0.8 - 1.5 MG/DL    GFR est AA >60 >60 ml/min/1.73m2    GFR est non-AA >60 >60 ml/min/1.73m2    Calcium 8.8 8.3 - 10.4 MG/DL   CBC W/O DIFF    Collection Time: 10/14/20  4:44 AM   Result Value Ref Range    WBC 12.1 (H) 4.3 - 11.1 K/uL    RBC 4.21 (L) 4.23 - 5.6 M/uL    HGB 12.2 (L) 13.6 - 17.2 g/dL    HCT 36.0 (L) 41.1 - 50.3 %    MCV 85.5 79.6 - 97.8 FL    MCH 29.0 26.1 - 32.9 PG    MCHC 33.9 31.4 - 35.0 g/dL    RDW 13.3 11.9 - 14.6 %    PLATELET 337 111 - 411 K/uL    MPV 11.2 9.4 - 12.3 FL    ABSOLUTE NRBC 0.00 0.0 - 0.2 K/uL   MAGNESIUM    Collection Time: 10/14/20  4:44 AM   Result Value Ref Range    Magnesium 1.8 1.8 - 2.4 mg/dL   GLUCOSE, POC    Collection Time: 10/14/20  6:32 AM   Result Value Ref Range    Glucose (POC) 111 (H) 65 - 100 mg/dL        Current Meds:  Current Facility-Administered Medications   Medication Dose Route Frequency    carvediloL (COREG) tablet 12.5 mg  12.5 mg Oral BID WITH MEALS    haloperidol lactate (HALDOL) injection 2 mg  2 mg IntraVENous Q6H PRN    haloperidol lactate (HALDOL) injection 4 mg  4 mg IntraMUSCular Q6H PRN    famotidine (PF) (PEPCID) 20 mg in 0.9% sodium chloride 10 mL injection  20 mg IntraVENous Q12H    acetaminophen (TYLENOL) tablet 650 mg  650 mg Oral Q6H PRN    QUEtiapine (SEROquel) tablet 12.5 mg  12.5 mg Oral BID    ondansetron (ZOFRAN) injection 4 mg  4 mg IntraVENous Q6H PRN    aspirin chewable tablet 81 mg  81 mg Oral DAILY    morphine injection 2 mg  2 mg IntraVENous Q4H PRN    nitroglycerin (NITROSTAT) tablet 0.4 mg  0.4 mg SubLINGual Q5MIN PRN    sodium chloride (NS) flush 5-40 mL  5-40 mL IntraVENous Q8H    sodium chloride (NS) flush 5-40 mL  5-40 mL IntraVENous PRN    atorvastatin (LIPITOR) tablet 40 mg  40 mg Oral QHS    piperacillin-tazobactam (ZOSYN) 3.375 g in 0.9% sodium chloride (MBP/ADV) 100 mL  3.375 g IntraVENous Q8H    LORazepam (ATIVAN) injection 1 mg  1 mg IntraVENous Q4H PRN    metoprolol (LOPRESSOR) injection 5 mg  5 mg IntraVENous Q6H PRN    labetaloL (NORMODYNE;TRANDATE) injection 20 mg  20 mg IntraVENous Q4H PRN    heparin 25,000 units in dextrose 500 mL infusion  12-25 Units/kg/hr IntraVENous TITRATE    acetaminophen (TYLENOL) suppository 650 mg  650 mg Rectal Q6H PRN       Other Studies:  Results for orders placed or performed during the hospital encounter of 10/11/20   2D ECHO COMPLETE ADULT (TTE) W OR 1400 [facility] [address] [facility], [facility]  [phone]    Transthoracic Echocardiogram  2D, M-mode, Doppler, and Color Doppler    Patient: Christine Lara  MR #: [id number]  : 1950  Age: 72 years  Gender: Male  Study date: 11-Oct-2020  Account #: [de-identified]  Height: 69 in  Weight: 214.5 lb  BSA: 2.13 mï¾²  Status:Routine  Location: [facility]  BP: 136/ 51    Allergies: HYDROCODONE, CODEINE, OXYCODONE    Sonographer:  MICHELLE Guerrier  Group:  [facility]  Referring Physician:  Roxy Young MD  Reading Physician:  DR. IRMA MARTIN DO    INDICATIONS: Chest pain with elevated troponin    PROCEDURE: This was a routine study. A transthoracic echocardiogram was  performed. The study included limited 2D imaging, M-mode, limited spectral  Doppler, and color Doppler. Intravenous contrast (Definity) was administered. Echocardiographic views were limited by poor patient compliance. This was a  technically difficult study. LEFT VENTRICLE: Systolic function was mildly reduced. Ejection fraction was  estimated in the range of 40 % to 45 %. This study was inadequate for the  evaluation of regional wall motion. AORTA: The ascending aorta was normal in size. SUMMARY:    -  Procedure information: This was a technically difficult study. Echocardiographic views were limited by poor patient compliance. -  Left ventricle: Systolic function was mildly reduced. Ejection fraction   was  estimated in the range of 40 % to 45 %. This study was inadequate for the  evaluation of regional wall motion. SYSTEM MEASUREMENT TABLES    2D  Ao Diam: 3.2 cm  LA Diam: 4.4 cm  %FS: 28.4 %  IVSd: 0.8 cm  LVIDd: 4.7 cm  LVIDs: 3.3 cm  LVOT Diam: 2.1 cm  LVPWd: 0.9 cm    Prepared and signed by     [address] [place] [address],   Signed 11-Oct-2020 14:15:13         No results found.     All Micro Results     None          SARS-CoV-2 Lab Results  \"Novel Coronavirus\" Test: No results found for: COV2NT   \"Emergent Disease\" Test: No results found for: EDPR  \"SARS-COV-2\" Test: No results found for: XGCOVT  Rapid Test: No results found for: COVR Assessment and Plan:     Hospital Problems as of 10/14/2020 Date Reviewed: 12/30/2019          Codes Class Noted - Resolved POA    Delirium ICD-10-CM: R41.0  ICD-9-CM: 780.09  10/12/2020 - Present Unknown        Sepsis ([facility] ICD-10-CM: A41.9  ICD-9-CM: 038.9, 995.91  10/11/2020 - Present Yes        Acute delirium ICD-10-CM: R41.0  ICD-9-CM: 780.09  10/11/2020 - Present Yes        Hypertensive urgency, malignant ICD-10-CM: I16.0  ICD-9-CM: 401.0  10/11/2020 - Present Unknown        Acute kidney injury ([facility] ICD-10-CM: N17.9  ICD-9-CM: 584.9  10/11/2020 - Present Unknown        * (Principal) Chest pain ICD-10-CM: R07.9  ICD-9-CM: 786.50  10/10/2020 - Present Yes        Elevated troponin ICD-10-CM: R77.8  ICD-9-CM: 790.6  10/10/2020 - Present Yes        Aspiration pneumonia ([facility] ICD-10-CM: J69.0  ICD-9-CM: 507.0  10/10/2020 - Present Yes        Leukocytosis ICD-10-CM: D72.829  ICD-9-CM: 288.60  10/10/2020 - Present Yes        Mixed hyperlipidemia ICD-10-CM: E78.2  ICD-9-CM: 272.2  4/4/2017 - Present Yes    Overview Signed 4/18/2018  8:47 AM by Uyen Hahn MD     Last Assessment & Plan:   Lipids at goal continue current treatment                   Plan:    · Acute metabolic encephalopathy:  · Improving  · followup mentation  · Haldol prn and scheduled seroquel  · PT/OT      · Pneumonia:  · Zosyn D 4      · NSTEMI:  · Cardiology following   · Pending C  · IV heparin, asa, lipitor, coreg      · Right shoulder rotator cuff repair:  · Wife requesting ortho consult that is placed       · Hematuria:  · Possibly related to jacobs trauma  · followup clearing       · Hypokalemia and hypomagnesemia:  · Replace and repeat lab      DC planning/Dispo:  pending      Diet:  DIET GI SOFT  DVT ppx:  IV heparin    Signed:  Pedrito Alatorre MD

## 2020-10-14 NOTE — PROGRESS NOTES
SPEECH PATHOLOGY NOTE:    Speech therapy attempt this AM. Patient currently NPO for heart cath later today. Will follow up at later time/date once patient is cleared for po intake.      Marii Deras, [facility], [facility] PEYTON ROBIN JAMES, CCC-SLP

## 2020-10-14 NOTE — PROGRESS NOTES
Physician Progress Note      PATIENT:               Linda Adam  CSN #:                  [id number]  :                       1950  ADMIT DATE:       10/11/2020 9:15 AM  DISCH DATE:  RESPONDING  PROVIDER #:        GERARDO ROSARIO MD          QUERY TEXT:    Pt admitted with chest pain and aspiration pneumonia. Patient became disoriented and agitated requiring restraints. If  possible, please document in progress notes and discharge summary if patient is being treated for one of the following: The medical record reflects the following:  Risk Factors: recent surgery, PNA  Clinical Indicators: As per nursing documentation on 10/11 pt agitated, attempting to climb out of bed, remove monitoring equipment and attempting to hit, kick , bite and pinch nursing staff. Patient unable to be redirected. Treatment: Haldol. Ativan. Restraints    Thank You,  Yuli Coppola RN CDI  Options provided:  -- Metabolic encephalopathy  -- Encephalopathy  -- Acute delirium  -- Other - I will add my own diagnosis  -- Disagree - Not applicable / Not valid  -- Disagree - Clinically unable to determine / Unknown  -- Refer to Clinical Documentation Reviewer    PROVIDER RESPONSE TEXT:    This patient has metabolic encephalopathy.     Query created by: Joana Flores on 10/13/2020 1:49 PM      Electronically signed by:  Diamante ROSARIO MD 10/14/2020 10:28 AM

## 2020-10-14 NOTE — PROGRESS NOTES
TRANSFER - OUT REPORT:    R radial [facility] with Dr Se Latham   TR band 15 ml  No bleeding or hematoma noted at site. Site soft    Verbal report given to Minal(name) on Peter Chacko  being transferred to [facility](unit) for routine progression of care       Report consisted of patients Situation, Background, Assessment and   Recommendations(SBAR). Information from the following report(s) Procedure Summary was reviewed with the receiving nurse. Lines:   Peripheral IV 10/11/20 Posterior;Right Hand (Active)   Site Assessment Clean, dry, & intact 10/14/20 0800   Phlebitis Assessment 0 10/14/20 0800   Infiltration Assessment 0 10/14/20 0800   Dressing Status Clean, dry, & intact 10/14/20 0800   Dressing Type Transparent;Tape 10/14/20 0800   Hub Color/Line Status Infusing;Patent; Flushed 10/14/20 0800   Alcohol Cap Used No 10/14/20 0800        Opportunity for questions and clarification was provided.       Patient transported with:   Registered Nurse

## 2020-10-14 NOTE — PROGRESS NOTES
Nothing to eat or drink after MN. Patient verbalized understanding. No further questions, request or complaints when asked. NPO sign posted on door frame, next to room number. Sitter at bedside.

## 2020-10-14 NOTE — PROGRESS NOTES
[facility] CARDIOLOGY PROGRESS NOTE    10/14/2020 12:01 PM    Admit Date: 10/11/2020        Subjective:   Stable overnight without angina, CHF, or palpitations. BP labile. Agitation improved, up in chair and out of restraints. No other complaints overnight. Tolerating meds well. Objective:      Vitals:    10/13/20 2133 10/14/20 0032 10/14/20 0540 10/14/20 0842   BP: (!) 101/33 (!) 152/57 (!) 167/79 (!) 177/86   Pulse: 67 81 78 75   Resp: 18 18 18 18   Temp: 97.8 °F (36.6 °C) 98.3 °F (36.8 °C) 98.2 °F (36.8 °C) 97.9 °F (36.6 °C)   SpO2: 94% 94% 91% 97%   Weight:   207 lb 1.6 oz (93.9 kg)        Physical Exam:  Neck- supple, no JVD  CV- regular rate and rhythm no MRG  Lung- clear bilaterally anteriorly/apically, decreased bibasilar  Abd- soft, nontender, nondistended  Ext- no edema  Skin- warm and dry    Data Review:   Recent Labs     10/14/20  0444 10/13/20  0455    140   K 3.4* 3.7   MG 1.8 1.8   BUN 10 16   CREA 0.96 1.11   * 119*   WBC 12.1* 14.1*   HGB 12.2* 12.4*   HCT 36.0* 37.3*    225       Assessment and Plan:     Principal Problem:    Chest pain (10/10/2020)- denies CP at present, see below.      Active Problems:    Mixed hyperlipidemia - resume statin when taking PO safely.       Overview: Last Assessment & Plan:       Lipids at goal continue current treatment       Elevated troponin (10/10/2020)- ? Silent LCX occlusion with no ECG changes, marked rise in troponin.  Denies CP at present and lucid and mentation normal, agitation resolved, out of restraints and comfortable in chair today. C today.        Aspiration pneumonia ([facility] [address] (10/10/2020)- IV ABX per primary MD's       Leukocytosis (10/10/2020)- recheck in AM, see above.        Sepsis ([facility] [address] (10/11/2020)- IV ABX       Acute delirium (10/11/2020)- improved, out of restraints and in chair, happy today.  Follow clinically        Hypertensive urgency, malignant (10/11/2020)- slowly titrate meds as tolerated       Acute kidney injury ([facility] [address] (10/11/2020)- hydrate as tolerated/needed, recheck BMP in AM     [facility] today  The benefits and risks of left heart catheterization and possible percutaneous intervention were discussed with the patient. Risks including but not limited to bleeding, infection, contrast allergy reaction, acute kidney injury, MI, stroke, emergent CABG and death were discussed. The patient understands the risks of the procedure and wishes to proceed.        MERCEDEZ Hampton MD  [facility] Cardiology  Pager [phone]

## 2020-10-14 NOTE — PROGRESS NOTES
Bedside and Verbal shift change report to be given to self (oncoming nurse) by Chi Pires (offgoing nurse). Report included the following information SBAR, Kardex, MAR and Recent Results.      Heparin gtt verified at bedside

## 2020-10-14 NOTE — PROCEDURES
.Brief Cardiac Procedure Note    Patient: Keyon Miramontes MRN: [id number]  SSN: [id number]    YOB: 1950  Age: 72 y.o. Sex: male      Date of Procedure: 10/14/2020     Pre-procedure Diagnosis: NSTEMI    Post-procedure Diagnosis: Coronary Artery Disease    Reason for Procedure: Suspected CAD    Procedure: Left Heart Catheterization    Brief Description of Procedure: LHC    Performed By: Shahla Richmond MD     Assistants: NONE    Anesthesia: Moderate Sedation    Estimated Blood Loss: Less than 10 mL      Specimens: None    Implants: None    Findings:   LV GLOBAL HK, WORST ANTEROLATERAL, 40%, EDP 30-35MMHG, NO MR OR AV GRADIENT  LMCA CA++ DISTAL 40-50%   LAD HEAVILY CA++   PROX TUBULAR 80% HAZY, MID FOCAL 80% , DISTAL VESSEL LARGE WITH MILD IRREG   D1  50% OSTIAL   D2 OCCLUDED WITH LEFT-LEFT COLLATERALS, SMALL  LCX CA++ PROX 60-70%   VERY PROX OM1 (RAMUS ESSENTIALLY) WITH CA++ OSTIAL 95%   OM2 MILD IRREG   OM3 PROX IRREG 50-60%  RCA MILD DZ    Complications: None    Recommendations: Continue medical therapy.   CABG EVALUATION, DR. Michelle Polanco CONSULTED    Signed By: Shahla Richmond MD     October 14, 2020 no

## 2020-10-14 NOTE — PROGRESS NOTES
Problem: Patient Education: Go to Patient Education Activity  Goal: Patient/Family Education  Description: 1. Patient will complete lower body bathing and dressing with SBA and adaptive equipment as needed. 2. Patient will complete toileting with SBA. 3. Patient will tolerate 25 minutes of OT treatment with 1-2 rest breaks to increase activity tolerance for ADLs. 4. Patient will complete functional transfers with SBA and adaptive equipment as needed. 5. Patient will complete functional activity while seated edge of bed unsupported with MOD I and adaptive equipment as needed. Timeframe: 7 visits     Outcome: Progressing Towards Goal      OCCUPATIONAL THERAPY: Initial Assessment, Daily Note, and AM 10/14/2020  INPATIENT: OT Visit Days: 1  [place] / [place] / Product Type: Medicare /      NAME/AGE/GENDER: Andrew Gonzalez is a 72 y.o. male   PRIMARY DIAGNOSIS:  Chest pain [R07.9]  Aspiration pneumonia ([facility] [address] [J69.0]  Delirium [R41.0] Chest pain Chest pain        ICD-10: Treatment Diagnosis:    Generalized Muscle Weakness (M62.81)  Difficulty in walking, Not elsewhere classified (R26.2)   Precautions/Allergies:    Fall precautions Hydrocodone; Codeine; and Oxycodone      ASSESSMENT:     Mr. Juancarlos Daniel presents for the above diagnoses. Upon arrival, pt supine in bed; slightly lethargic however easily awakens to voice. Pt oriented x to person, place, and situation, however presents with mild confusion during conversation (could be d/t to drowsiness). 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 At baseline, pt notes independence with all ADLs, IADLs, functional mobility, and work tasks; still drives. Denies fall hx. Today, pt presents with deficits in overall strength, activity tolerance, ADL performance, and functional mobility. Pt transitioned to sitting edge of bed with min A.  Static sitting balance is intact; close CGA provided for unsupported sitting balance. While sitting edge of bed unsupported, pt performed self-care tasks including donning socks and washing face; mod A for donning socks, however pt demonstrated ability to wash face with independence after set-up. BUE AROM and strength (4/5) are generally decreased but WFL. MD entering room to consult with patient; pt subsequently returned to supine in bed with min A and left with needs met, call light within reach, and RN notified. At this time, Cathryn Bumpers is functioning below baseline for ADL performance, and functional mobility. Pt would benefit from skilled OT services to address OT goals and plan of care. This section established at most recent assessment   PROBLEM LIST (Impairments causing functional limitations):  Decreased Strength  Decreased ADL/Functional Activities  Decreased Transfer Abilities  Decreased Ambulation Ability/Technique  Decreased Balance  Decreased Activity Tolerance  Decreased Cognition   INTERVENTIONS PLANNED: (Benefits and precautions of occupational therapy have been discussed with the patient.)  Activities of daily living training  Adaptive equipment training  Balance training  Clothing management  Community reintergration  Donning&doffing training  Neuromuscular re-eduation  Re-evaluation  Therapeutic activity  Therapeutic exercise     TREATMENT PLAN: Frequency/Duration: Follow patient 3x/week to address above goals. Rehabilitation Potential For Stated Goals: Good     REHAB RECOMMENDATIONS (at time of discharge pending progress):    Placement: It is my opinion, based on this patient's performance to date, that Mr. Ethel Paulino may benefit from intensive therapy at an [address] [place] [address] after discharge due to a probable need for multiple therapy disciplines and potential to make ongoing and sustainable functional improvement that is of practical value. .  Equipment:   TBD              OCCUPATIONAL PROFILE AND HISTORY:   History of Present Injury/Illness (Reason for Referral):  See H&P  Past Medical History/Comorbidities:   Mr. Viji Olivarez  has a past medical history of AION (acute ischemic optic neuropathy), AION (acute ischemic optic neuropathy), bilateral, Former smoker, stopped smoking in distant past, Hepatitis A (1980), Hypercholesterolemia, Hypertension, Prostate cancer ([facility] [address] (2009), and Wears hearing aid in both ears. Mr. Viji Olivarez  has a past surgical history that includes hx tonsil and adenoidectomy; hx heart catheterization (2011); and hx prostate surgery (11/2016). Social History/Living Environment:   Home Environment: Private residence  One/Two Story Residence: Two story  Living Alone: No  Support Systems: Family member(s)  Patient Expects to be Discharged to[de-identified] Private residence  Current DME Used/Available at Home: Walker, rolling  Prior Level of Function/Work/Activity:  Independent with ADLs and functional mobility; drives and still works as an orthotist  Personal Factors:          Sex:  male        Age:  72 y.o. Other factors that influence how disability is experienced by the patient:  multiple co-morbidities    Number of Personal Factors/Comorbidities that affect the Plan of Care: Brief history (0):  LOW COMPLEXITY   ASSESSMENT OF OCCUPATIONAL PERFORMANCE[de-identified]   Activities of Daily Living:   Basic ADLs (From Assessment) Complex ADLs (From Assessment)   Feeding: Minimum assistance  Oral Facial Hygiene/Grooming: Minimum assistance  Bathing: Moderate assistance  Upper Body Dressing: Minimum assistance  Lower Body Dressing: Moderate assistance  Toileting: Moderate assistance Instrumental ADL  Meal Preparation: Maximum assistance  Homemaking: Maximum assistance   Grooming/Bathing/Dressing Activities of Daily Living     Cognitive Retraining  Safety/Judgement: Awareness of environment; Fall prevention                       Bed/Mat Mobility  Rolling: Minimum assistance  Supine to Sit: Minimum assistance  Sit to Supine: Minimum assistance  Sit to Stand: Moderate assistance  Stand to Sit: Minimum assistance  Scooting: Minimum assistance     Most Recent Physical Functioning:   Gross Assessment:  AROM: Generally decreased, functional  Strength: Generally decreased, functional               Posture:  Posture (WDL): Exceptions to WDL  Posture Assessment: Forward head, Rounded shoulders  Balance:  Sitting: Impaired  Sitting - Static: Good (unsupported)  Sitting - Dynamic: Fair (occasional)  Standing: Impaired  Standing - Static: Fair  Standing - Dynamic : Fair Bed Mobility:  Rolling: Minimum assistance  Supine to Sit: Minimum assistance  Sit to Supine: Minimum assistance  Scooting: Minimum assistance  Wheelchair Mobility:     Transfers:  Sit to Stand: Moderate assistance  Stand to Sit: Minimum assistance            Patient Vitals for the past 6 hrs:   BP BP Patient Position SpO2 Pulse   10/14/20 0842 (!) 177/86 At rest 97 % 75   10/14/20 1227 (!) 154/67 Lying left side 94 % 61       Mental Status  Neurologic State: Alert  Orientation Level: Oriented to person, Oriented to place, Oriented to situation  Cognition: Follows commands  Perception: Appears intact  Perseveration: No perseveration noted  Safety/Judgement: Awareness of environment, Fall prevention                          Physical Skills Involved:  Balance  Strength  Activity Tolerance  Gross Motor Control Cognitive Skills Affected (resulting in the inability to perform in a timely and safe manner):  Perception  Executive Function  Immediate Memory  Sustained Attention  Divided Attention Psychosocial Skills Affected:  Habits/Routines  Environmental Adaptation   Number of elements that affect the Plan of Care: 5+:  HIGH COMPLEXITY   CLINICAL DECISION MAKIN [facility] [place] [address] [address] AM-PAC 6 Clicks   Daily Activity Inpatient Short Form  How much help from another person does the patient currently need. .. Total A Lot A Little None   1. Putting on and taking off regular lower body clothing? [] 1   [x] 2   [] 3   [] 4   2. Bathing (including washing, rinsing, drying)? [] 1   [x] 2   [] 3   [] 4   3. Toileting, which includes using toilet, bedpan or urinal?   [] 1   [x] 2   [] 3   [] 4   4. Putting on and taking off regular upper body clothing? [] 1   [] 2   [x] 3   [] 4   5. Taking care of personal grooming such as brushing teeth? [] 1   [] 2   [x] 3   [] 4   6. Eating meals? [] 1   [] 2   [x] 3   [] 4   © 2007, Trustees of [address] [place] [address] [address] [address], under license to [employer]. All rights reserved      Score:  Initial: 15 Most Recent: X (Date: -- )    Interpretation of Tool:  Represents activities that are increasingly more difficult (i.e. Bed mobility, Transfers, Gait). Medical Necessity:     Patient demonstrates   good   rehab potential due to higher previous functional level. Reason for Services/Other Comments:  Patient continues to require skilled intervention due to   medical complications and patient unable to attend/participate in therapy as expected  . Use of outcome tool(s) and clinical judgement create a POC that gives a: LOW COMPLEXITY         TREATMENT:   (In addition to Assessment/Re-Assessment sessions the following treatments were rendered)     Pre-treatment Symptoms/Complaints:    Pain: Initial:   Pain Intensity 1: 0 /10 Post Session:  same     Neuromuscular Re-education: ( 10 minutes):  Exercise/activities per grid below to improve balance, coordination, and posture. Required minimal verbal and tactile cues to promote static and dynamic balance in sitting. While in unsupported static and dynamic sitting, pt performed functional tasks including washing face and donning socks. Min to mod A required for functional transfers today. Braces/Orthotics/Lines/Etc:   IV  O2 Device: Room air  Treatment/Session Assessment:    Response to Treatment:  tolerated well with no issues noted.    Interdisciplinary Collaboration:   Occupational Therapist  Registered Nurse  Physician  After treatment position/precautions:   Supine in bed  Bed/Chair-wheels locked  Bed in low position  Call light within reach  RN notified   Compliance with Program/Exercises: Will assess as treatment progresses. Recommendations/Intent for next treatment session: \"Next visit will focus on advancements to more challenging activities and reduction in assistance provided\".   Total Treatment Duration:  OT Patient Time In/Time Out  Time In: 0956  Time Out: [address], OT

## 2020-10-14 NOTE — PROCEDURES
[facility]  CARDIAC CATH    Name:  Lady Ornelas  MR#:  [id number]  :  1950  ACCOUNT #:  [de-identified]  DATE OF SERVICE:  10/14/2020    PROCEDURES PERFORMED:  Left heart cath with coronary angiography and left ventriculogram.    PREOPERATIVE DIAGNOSIS:  Non-ST-elevation myocardial infarction in the setting of sepsis. POSTOPERATIVE DIAGNOSIS:  Severe multi-vessel coronary artery disease. SURGEON:  Marisol Randle MD    ASSISTANT:  None. ESTIMATED BLOOD LOSS:  Less than 3 mL. SPECIMENS REMOVED:  None. COMPLICATIONS:  None. IMPLANTS:  None. ANESTHESIA:  The patient was not administered any sedation as he was somewhat lethargic after 2 mg of intravenous morphine on the floor prior to transport to the cath lab. PROCEDURE TECHNIQUE:  After informed consent was obtained, the patient was brought to the cath lab, prepped and draped in usual fashion. A 6-Finnish sheath was placed in the right radial artery via the micropuncture modified Seldinger technique and left heart catheterization performed using standard 5-Finnish angled pigtail and Tiger catheters. Manual pressure will be applied to the patient's access site via TR band protocol. There were no complications. PRESSURE RESULTS:  Aorta 140/80. Left ventricle 140 over 30 to 35. Left ventriculogram reveals global hypokinesis that is mild, but there is severe focal mid anterolateral hypokinesis in the distribution of a diagonal branch. Ejection fraction estimated to be about 40%. There is mild mitral regurgitation. There is no aortic valve gradient on catheter pullback. End-diastolic pressure is severely elevated. The ascending aorta is normal.    CORONARY ANATOMY:  There is severe calcification of the distal left main extending into the proximal to mid LAD and circumflex. The left main is large caliber, but has a distal 50 to 60% angiographic narrowing.   Dividing into an LAD and circumflex in the usual fashion. The LAD has mild irregularities proximally, but has a calcified tapering stenosis to about 80% with hazy lucency prior to the takeoff the first diagonal branch. The diagonal has an ostial 40 to 50% narrowing and is a smaller caliber vessel with mild irregularity. The continuing LAD has a focal calcified 80% lesion. The distal and apical LAD, however, have minimal calcification with minimal irregularities and is a suitable internal mammary target. The diagonal may be large enough to accept a graft as well. The circumflex gives off a very high first obtuse marginal branch, which essentially functions as a ramus intermedius branch. There is a calcified ostial 99% stenosis with moderate disease with calcium in the proximal third of this vessel to about 40%. The remainder of the vessel has mild irregularity. The continuing very proximal circumflex has irregular proximal hazy, calcified 60% disease. It then gives rise to two obtuse marginal branches. The first obtuse marginal branch is large with mild irregularity. The second obtuse marginal branch has a tubular 40 to 50% calcified stenosis with mild irregularities distally. The right coronary is a dominant vessel with mild irregularity throughout. CONCLUSIONS:  1. Ischemic cardiomyopathy. 2.  Ejection fraction 40% with severe calcified LAD and circumflex disease as described above. The patient is a suitable candidate for bypass grafting. Dr. Fidel Phan is consulted.         Eric Burns MD      [email or web address]  D:  10/14/2020 15:05  T:  10/14/2020 19:01  JOB #:  [id number]  CC:  Bubba Ruelas MD       [address] Cardiology (Delete CC field if not dictated.)

## 2020-10-14 NOTE — PROGRESS NOTES
Radial compression band removed at 1745 after slowly reducing air from 12 cc to zero as per hospital protocol. No bleeding or hematoma noted. 2 x 2 gauze with tegaderm placed over puncture site. The affected extremity is warm and dry to the touch. Frequent vital signs printed and placed on bedside chart. Patient instructed to call if any bleeding noted on gauze. Patient verbalized understanding the nursing instructions.

## 2020-10-15 LAB
ANION GAP SERPL CALC-SCNC: 8 MMOL/L (ref 7–16)
BACTERIA SPEC CULT: NORMAL
BACTERIA SPEC CULT: NORMAL
BASOPHILS # BLD: 0.1 K/UL (ref 0–0.2)
BASOPHILS NFR BLD: 1 % (ref 0–2)
BUN SERPL-MCNC: 12 MG/DL (ref 8–23)
CALCIUM SERPL-MCNC: 8.8 MG/DL (ref 8.3–10.4)
CHLORIDE SERPL-SCNC: 107 MMOL/L (ref 98–107)
CO2 SERPL-SCNC: 27 MMOL/L (ref 21–32)
CREAT SERPL-MCNC: 1.07 MG/DL (ref 0.8–1.5)
DIFFERENTIAL METHOD BLD: ABNORMAL
EOSINOPHIL # BLD: 0.6 K/UL (ref 0–0.8)
EOSINOPHIL NFR BLD: 7 % (ref 0.5–7.8)
ERYTHROCYTE [DISTWIDTH] IN BLOOD BY AUTOMATED COUNT: 13.4 % (ref 11.9–14.6)
GLUCOSE SERPL-MCNC: 112 MG/DL (ref 65–100)
HCT VFR BLD AUTO: 34.9 % (ref 41.1–50.3)
HGB BLD-MCNC: 11.5 G/DL (ref 13.6–17.2)
IMM GRANULOCYTES # BLD AUTO: 0.2 K/UL (ref 0–0.5)
IMM GRANULOCYTES NFR BLD AUTO: 2 % (ref 0–5)
LYMPHOCYTES # BLD: 1.5 K/UL (ref 0.5–4.6)
LYMPHOCYTES NFR BLD: 17 % (ref 13–44)
MAGNESIUM SERPL-MCNC: 2.1 MG/DL (ref 1.8–2.4)
MCH RBC QN AUTO: 28.6 PG (ref 26.1–32.9)
MCHC RBC AUTO-ENTMCNC: 33 G/DL (ref 31.4–35)
MCV RBC AUTO: 86.8 FL (ref 79.6–97.8)
MONOCYTES # BLD: 1 K/UL (ref 0.1–1.3)
MONOCYTES NFR BLD: 11 % (ref 4–12)
NEUTS SEG # BLD: 5.6 K/UL (ref 1.7–8.2)
NEUTS SEG NFR BLD: 63 % (ref 43–78)
NRBC # BLD: 0 K/UL (ref 0–0.2)
PLATELET # BLD AUTO: 202 K/UL (ref 150–450)
PMV BLD AUTO: 11.3 FL (ref 9.4–12.3)
POTASSIUM SERPL-SCNC: 3.7 MMOL/L (ref 3.5–5.1)
RBC # BLD AUTO: 4.02 M/UL (ref 4.23–5.6)
SERVICE CMNT-IMP: NORMAL
SERVICE CMNT-IMP: NORMAL
SODIUM SERPL-SCNC: 142 MMOL/L (ref 136–145)
UFH PPP CHRO-ACNC: 0.54 IU/ML (ref 0.3–0.7)
UFH PPP CHRO-ACNC: <0.3 IU/ML (ref 0.3–0.7)
WBC # BLD AUTO: 9 K/UL (ref 4.3–11.1)

## 2020-10-15 PROCEDURE — 2709999900 HC NON-CHARGEABLE SUPPLY

## 2020-10-15 PROCEDURE — 99232 SBSQ HOSP IP/OBS MODERATE 35: CPT | Performed by: INTERNAL MEDICINE

## 2020-10-15 PROCEDURE — 74011250636 HC RX REV CODE- 250/636: Performed by: INTERNAL MEDICINE

## 2020-10-15 PROCEDURE — 74011000250 HC RX REV CODE- 250: Performed by: INTERNAL MEDICINE

## 2020-10-15 PROCEDURE — 92526 ORAL FUNCTION THERAPY: CPT

## 2020-10-15 PROCEDURE — 65660000000 HC RM CCU STEPDOWN

## 2020-10-15 PROCEDURE — 74011250637 HC RX REV CODE- 250/637: Performed by: INTERNAL MEDICINE

## 2020-10-15 PROCEDURE — 85520 HEPARIN ASSAY: CPT

## 2020-10-15 PROCEDURE — 74011250637 HC RX REV CODE- 250/637: Performed by: HOSPITALIST

## 2020-10-15 PROCEDURE — 83735 ASSAY OF MAGNESIUM: CPT

## 2020-10-15 PROCEDURE — 80048 BASIC METABOLIC PNL TOTAL CA: CPT

## 2020-10-15 PROCEDURE — 36415 COLL VENOUS BLD VENIPUNCTURE: CPT

## 2020-10-15 PROCEDURE — 74011250636 HC RX REV CODE- 250/636: Performed by: HOSPITALIST

## 2020-10-15 PROCEDURE — 74011000258 HC RX REV CODE- 258: Performed by: HOSPITALIST

## 2020-10-15 PROCEDURE — 85025 COMPLETE CBC W/AUTO DIFF WBC: CPT

## 2020-10-15 RX ORDER — ATORVASTATIN CALCIUM 80 MG/1
80 TABLET, FILM COATED ORAL
Status: DISCONTINUED | OUTPATIENT
Start: 2020-10-15 | End: 2020-10-16 | Stop reason: HOSPADM

## 2020-10-15 RX ORDER — HEPARIN SODIUM 5000 [USP'U]/ML
40 INJECTION, SOLUTION INTRAVENOUS; SUBCUTANEOUS ONCE
Status: DISCONTINUED | OUTPATIENT
Start: 2020-10-15 | End: 2020-10-15

## 2020-10-15 RX ORDER — LISINOPRIL 5 MG/1
10 TABLET ORAL 2 TIMES DAILY
Status: DISCONTINUED | OUTPATIENT
Start: 2020-10-15 | End: 2020-10-16 | Stop reason: HOSPADM

## 2020-10-15 RX ADMIN — Medication 10 ML: at 22:51

## 2020-10-15 RX ADMIN — QUETIAPINE FUMARATE 12.5 MG: 25 TABLET, FILM COATED ORAL at 10:36

## 2020-10-15 RX ADMIN — HEPARIN SODIUM 3650 UNITS: 5000 INJECTION INTRAVENOUS; SUBCUTANEOUS at 08:16

## 2020-10-15 RX ADMIN — CARVEDILOL 12.5 MG: 12.5 TABLET, FILM COATED ORAL at 10:32

## 2020-10-15 RX ADMIN — LISINOPRIL 10 MG: 5 TABLET ORAL at 18:03

## 2020-10-15 RX ADMIN — PIPERACILLIN SODIUM AND TAZOBACTAM SODIUM 3.38 G: 3; .375 INJECTION, POWDER, LYOPHILIZED, FOR SOLUTION INTRAVENOUS at 00:02

## 2020-10-15 RX ADMIN — MORPHINE SULFATE 2 MG: 2 INJECTION, SOLUTION INTRAMUSCULAR; INTRAVENOUS at 03:08

## 2020-10-15 RX ADMIN — Medication 5 ML: at 03:09

## 2020-10-15 RX ADMIN — PIPERACILLIN SODIUM AND TAZOBACTAM SODIUM 3.38 G: 3; .375 INJECTION, POWDER, LYOPHILIZED, FOR SOLUTION INTRAVENOUS at 18:04

## 2020-10-15 RX ADMIN — FAMOTIDINE 20 MG: 10 INJECTION INTRAVENOUS at 22:50

## 2020-10-15 RX ADMIN — HEPARIN SODIUM 16 UNITS/KG/HR: 5000 INJECTION, SOLUTION INTRAVENOUS at 15:19

## 2020-10-15 RX ADMIN — ATORVASTATIN CALCIUM 80 MG: 80 TABLET, FILM COATED ORAL at 22:51

## 2020-10-15 RX ADMIN — QUETIAPINE FUMARATE 12.5 MG: 25 TABLET, FILM COATED ORAL at 18:03

## 2020-10-15 RX ADMIN — LISINOPRIL 10 MG: 5 TABLET ORAL at 10:32

## 2020-10-15 RX ADMIN — CARVEDILOL 12.5 MG: 12.5 TABLET, FILM COATED ORAL at 18:03

## 2020-10-15 RX ADMIN — FAMOTIDINE 20 MG: 10 INJECTION INTRAVENOUS at 10:32

## 2020-10-15 RX ADMIN — Medication 10 ML: at 13:31

## 2020-10-15 RX ADMIN — ASPIRIN 81 MG: 81 TABLET, CHEWABLE ORAL at 10:32

## 2020-10-15 RX ADMIN — PIPERACILLIN SODIUM AND TAZOBACTAM SODIUM 3.38 G: 3; .375 INJECTION, POWDER, LYOPHILIZED, FOR SOLUTION INTRAVENOUS at 10:32

## 2020-10-15 NOTE — PROGRESS NOTES
CTS-saw pt again with wife in room. Pt does not know where he is or what day it is. He doesn't remember why he is here. Wife states that the night after rotator cuff surgery, he suddenly seemed very \"paranoid. \" She states he has had other surgeries in the past but has never been delirious afterwards. She is in agreement with psychiatric consult. Pt states hallucinations are visual. He states he is seeing \"hot places, nursing homes and other places I wouldn't be. \"     Devan Byrd PA-C

## 2020-10-15 NOTE — PROGRESS NOTES
Hospitalist Note     Admit Date:  10/11/2020  9:15 AM   Name:  Keyon Miramontes   Age:  72 y.o.  :  1950   MRN:  [id number]   PCP:  Tere Bender DO  Treatment Team: Attending Provider: Denton Norman MD; Consulting Provider: Jenise Ash DO; Utilization Review: Emery Bhagat; Consulting Provider: Nanda Hays MD; Care Manager: Saintclair Pointer, RN; Consulting Provider: Kushal Stevens MD    HPI/Subjective:       Mr. Vangie Montanez is a 71 yo male with PMH of HTN, HLP, and 10-09-20 arthroscopic rotator cuff repair who is admitted with acute metabolic encephalopathy, pneumonia and NSTEMI. He has been agitated and required seroquel, prn haldol, restraints and sitter to bedside. CT head no acute issues. He has been covered for pneumonia with zosyn, CXR showed right base opacity. BC NGTD. UA not available. Cardiology following for NSTEMI s/p [facility] 10-14-20 and recommending CABG. ECHO EF 40-45% and coreg added. CT surgery consulted and advised need for psychiatry consult prior to CABG consideration. Ortho consulted for right shoulder postop evaluation per family request.   Discharge plans pending.        10-15-20 wife present, working on putting in his hearing aids, not very interactive, some hallucinations described by RN      Objective:     Patient Vitals for the past 24 hrs:   Temp Pulse Resp BP SpO2   10/15/20 1309 97.7 °F (36.5 °C) 68  (!) 173/76 96 %   10/15/20 0858 98.3 °F (36.8 °C) 79 18 (!) 172/76 95 %   10/15/20 0524 98.3 °F (36.8 °C) 71 18 (!) 145/54 95 %   10/15/20 0400  73      10/15/20 0308  71 18     10/15/20 0000 98.6 °F (37 °C) 82 17 (!) 140/69 96 %   10/14/20 2121 98.2 °F (36.8 °C) 76 20 (!) 179/71 95 %   10/14/20 1956  (!) 51 17       Oxygen Therapy  O2 Sat (%): 96 % (10/15/20 1309)  Pulse via Oximetry: 96 beats per minute (10/12/20 0719)  O2 Device: Room air (10/15/20 0807)  O2 Flow Rate (L/min): 2 l/min (10/14/20 1428)    Estimated body mass index is 29.68 kg/m² as calculated from the following:    Height as of 10/10/20: 5' 9\" (1.753 m). Weight as of this encounter: 91.2 kg (201 lb). Intake/Output Summary (Last 24 hours) at 10/15/2020 1608  Last data filed at 10/15/2020 0527  Gross per 24 hour   Intake 510 ml   Output 700 ml   Net -190 ml       *Note that automatically entered I/Os may not be accurate; dependent on patient compliance with collection and accurate  by techs. General:    Elderly, no distress,  verbal  CV:   RRR. No murmur, rub, or gallop, trace  edema  Lungs:   Clear anterior  Abdomen:   Soft, nontender, nondistended. Decreased BS  Extremities: Warm and dry  Skin:     No rashes or jaundice. Neuro:  Alert, confused     Data Reviewed:  I have reviewed all labs, meds, and studies from the last 24 hours:  Recent Results (from the past 24 hour(s))   CBC WITH AUTOMATED DIFF    Collection Time: 10/15/20  4:26 AM   Result Value Ref Range    WBC 9.0 4.3 - 11.1 K/uL    RBC 4.02 (L) 4.23 - 5.6 M/uL    HGB 11.5 (L) 13.6 - 17.2 g/dL    HCT 34.9 (L) 41.1 - 50.3 %    MCV 86.8 79.6 - 97.8 FL    MCH 28.6 26.1 - 32.9 PG    MCHC 33.0 31.4 - 35.0 g/dL    RDW 13.4 11.9 - 14.6 %    PLATELET 908 695 - 817 K/uL    MPV 11.3 9.4 - 12.3 FL    ABSOLUTE NRBC 0.00 0.0 - 0.2 K/uL    DF AUTOMATED      NEUTROPHILS 63 43 - 78 %    LYMPHOCYTES 17 13 - 44 %    MONOCYTES 11 4.0 - 12.0 %    EOSINOPHILS 7 0.5 - 7.8 %    BASOPHILS 1 0.0 - 2.0 %    IMMATURE GRANULOCYTES 2 0.0 - 5.0 %    ABS. NEUTROPHILS 5.6 1.7 - 8.2 K/UL    ABS. LYMPHOCYTES 1.5 0.5 - 4.6 K/UL    ABS. MONOCYTES 1.0 0.1 - 1.3 K/UL    ABS. EOSINOPHILS 0.6 0.0 - 0.8 K/UL    ABS. BASOPHILS 0.1 0.0 - 0.2 K/UL    ABS. IMM.  GRANS. 0.2 0.0 - 0.5 K/UL   METABOLIC PANEL, BASIC    Collection Time: 10/15/20  4:26 AM   Result Value Ref Range    Sodium 142 136 - 145 mmol/L    Potassium 3.7 3.5 - 5.1 mmol/L    Chloride 107 98 - 107 mmol/L    CO2 27 21 - 32 mmol/L    Anion gap 8 7 - 16 mmol/L    Glucose 112 (H) 65 - 100 mg/dL    BUN 12 8 - 23 MG/DL    Creatinine 1.07 0.8 - 1.5 MG/DL    GFR est AA >60 >60 ml/min/1.73m2    GFR est non-AA >60 >60 ml/min/1.73m2    Calcium 8.8 8.3 - 10.4 MG/DL   MAGNESIUM    Collection Time: 10/15/20  4:26 AM   Result Value Ref Range    Magnesium 2.1 1.8 - 2.4 mg/dL   HEPARIN XA UFH    Collection Time: 10/15/20  4:26 AM   Result Value Ref Range    Heparin Xa UFH <0.3 (L) 0.3 - 0.7 IU/mL   HEPARIN XA UFH    Collection Time: 10/15/20  3:05 PM   Result Value Ref Range    Heparin Xa UFH 0.54 0.3 - 0.7 IU/mL        Current Meds:  Current Facility-Administered Medications   Medication Dose Route Frequency    atorvastatin (LIPITOR) tablet 80 mg  80 mg Oral QHS    lisinopriL (PRINIVIL, ZESTRIL) tablet 10 mg  10 mg Oral BID    heparin 25,000 units in dextrose 500 mL infusion  12-25 Units/kg/hr IntraVENous TITRATE    carvediloL (COREG) tablet 12.5 mg  12.5 mg Oral BID WITH MEALS    haloperidol lactate (HALDOL) injection 2 mg  2 mg IntraVENous Q6H PRN    haloperidol lactate (HALDOL) injection 4 mg  4 mg IntraMUSCular Q6H PRN    famotidine (PF) (PEPCID) 20 mg in 0.9% sodium chloride 10 mL injection  20 mg IntraVENous Q12H    acetaminophen (TYLENOL) tablet 650 mg  650 mg Oral Q6H PRN    QUEtiapine (SEROquel) tablet 12.5 mg  12.5 mg Oral BID    ondansetron (ZOFRAN) injection 4 mg  4 mg IntraVENous Q6H PRN    aspirin chewable tablet 81 mg  81 mg Oral DAILY    morphine injection 2 mg  2 mg IntraVENous Q4H PRN    nitroglycerin (NITROSTAT) tablet 0.4 mg  0.4 mg SubLINGual Q5MIN PRN    sodium chloride (NS) flush 5-40 mL  5-40 mL IntraVENous Q8H    sodium chloride (NS) flush 5-40 mL  5-40 mL IntraVENous PRN    piperacillin-tazobactam (ZOSYN) 3.375 g in 0.9% sodium chloride (MBP/ADV) 100 mL  3.375 g IntraVENous Q8H    LORazepam (ATIVAN) injection 1 mg  1 mg IntraVENous Q4H PRN    metoprolol (LOPRESSOR) injection 5 mg  5 mg IntraVENous Q6H PRN    labetaloL (NORMODYNE;TRANDATE) injection 20 mg  20 mg IntraVENous Q4H PRN    acetaminophen (TYLENOL) suppository 650 mg  650 mg Rectal Q6H PRN       Other Studies:  Results for orders placed or performed during the hospital encounter of 10/11/20   2D ECHO COMPLETE ADULT (TTE) W OR WO CONTR    Narrative    Samson Humphrey [address], [address] [facility]  [phone]    Transthoracic Echocardiogram  2D, M-mode, Doppler, and Color Doppler    Patient: Reina Stapleton  MR #: [id number]  : 1950  Age: 72 years  Gender: Male  Study date: 11-Oct-2020  Account #: [de-identified]  Height: 69 in  Weight: 214.5 lb  BSA: 2.13 mï¾²  Status:Routine  Location: [facility]  BP: 136/ 51    Allergies: HYDROCODONE, CODEINE, OXYCODONE    Sonographer:  MICHELLE Lopes  Group:  [facility]  Referring Physician:  Stephanie Damon MD  Reading Physician:  DR. IRMA MARTIN DO    INDICATIONS: Chest pain with elevated troponin    PROCEDURE: This was a routine study. A transthoracic echocardiogram was  performed. The study included limited 2D imaging, M-mode, limited spectral  Doppler, and color Doppler. Intravenous contrast (Definity) was administered. Echocardiographic views were limited by poor patient compliance. This was a  technically difficult study. LEFT VENTRICLE: Systolic function was mildly reduced. Ejection fraction was  estimated in the range of 40 % to 45 %. This study was inadequate for the  evaluation of regional wall motion. AORTA: The ascending aorta was normal in size. SUMMARY:    -  Procedure information: This was a technically difficult study. Echocardiographic views were limited by poor patient compliance. -  Left ventricle: Systolic function was mildly reduced. Ejection fraction   was  estimated in the range of 40 % to 45 %. This study was inadequate for the  evaluation of regional wall motion.     SYSTEM MEASUREMENT TABLES    2D  Ao Diam: 3.2 cm  LA Diam: 4.4 cm  %FS: 28.4 %  IVSd: 0.8 cm  LVIDd: 4.7 cm  LVIDs: 3.3 cm  LVOT Diam: 2.1 cm  LVPWd: 0.9 cm    Prepared and signed by     [address] [place] [address], DO  Signed 11-Oct-2020 14:15:13         No results found.     All Micro Results     None          SARS-CoV-2 Lab Results  \"Novel Coronavirus\" Test: No results found for: COV2NT   \"Emergent Disease\" Test: No results found for: EDPR  \"SARS-COV-2\" Test: No results found for: XGCOVT  Rapid Test: No results found for: COVR         Assessment and Plan:     Hospital Problems as of 10/15/2020 Date Reviewed: 12/30/2019          Codes Class Noted - Resolved POA    Delirium ICD-10-CM: R41.0  ICD-9-CM: 780.09  10/12/2020 - Present Unknown        Sepsis ([facility] ICD-10-CM: A41.9  ICD-9-CM: 038.9, 995.91  10/11/2020 - Present Yes        Acute delirium ICD-10-CM: R41.0  ICD-9-CM: 780.09  10/11/2020 - Present Yes        Hypertensive urgency, malignant ICD-10-CM: I16.0  ICD-9-CM: 401.0  10/11/2020 - Present Unknown        Acute kidney injury ([facility] ICD-10-CM: N17.9  ICD-9-CM: 584.9  10/11/2020 - Present Unknown        * (Principal) Chest pain ICD-10-CM: R07.9  ICD-9-CM: 786.50  10/10/2020 - Present Yes        Elevated troponin ICD-10-CM: R77.8  ICD-9-CM: 790.6  10/10/2020 - Present Yes        Aspiration pneumonia ([facility] ICD-10-CM: J69.0  ICD-9-CM: 507.0  10/10/2020 - Present Yes        Leukocytosis ICD-10-CM: E08.654  ICD-9-CM: 288.60  10/10/2020 - Present Yes        Mixed hyperlipidemia ICD-10-CM: E78.2  ICD-9-CM: 272.2  4/4/2017 - Present Yes    Overview Signed 4/18/2018  8:47 AM by Mustapha Weber MD     Last Assessment & Plan:   Lipids at goal continue current treatment                   Plan:    · Acute metabolic encephalopathy:  · Improving daily  · followup mentation  · Haldol prn and scheduled seroquel  · PT/OT  · Tele psychiatry consult per CT surgery prior to CABG      · Pneumonia:  · Zosyn D 5/7      · NSTEMI/CAD:  · Cardiology/CT surgery following  · IV heparin, asa, lipitor, coreg  · Ongoing possible CABG discussions       · Right shoulder rotator cuff repair:  · pending ortho consult       · Hematuria:  · Possibly related to jacobs trauma  · followup clearing       · Hypokalemia and hypomagnesemia:  · Replace and repeat lab      DC planning/Dispo:  pending      Diet:  DIET GI SOFT  DVT ppx:  IV heparin    Signed:  Paul Hanna MD

## 2020-10-15 NOTE — PROGRESS NOTES
[facility] CARDIOLOGY PROGRESS NOTE           10/15/2020 8:17 AM    Admit Date: 10/11/2020      Subjective:   No cp or sob    ROS:  Cardiovascular:  As noted above    Objective:      Vitals:    10/15/20 0000 10/15/20 0308 10/15/20 0400 10/15/20 0524   BP: (!) 140/69   (!) 145/54   Pulse: 82 71 73 71   Resp: 17 18  18   Temp: 98.6 °F (37 °C)   98.3 °F (36.8 °C)   SpO2: 96%   95%   Weight:    91.2 kg (201 lb)       Physical Exam:  General-No Acute Distress, oriented x 3  Neck- supple, no JVD  CV- regular rate and rhythm no MRG  Lung- clear bilaterally  Abd- soft, nontender, nondistended  Ext- no edema bilaterally. Skin- warm and dry    Data Review:   Recent Labs     10/15/20  0426 10/14/20  0444    140   K 3.7 3.4*   MG 2.1 1.8   BUN 12 10   CREA 1.07 0.96   * 119*   WBC 9.0 12.1*   HGB 11.5* 12.2*   HCT 34.9* 36.0*    215       Assessment/Plan:     Nstemi  LV dysfxn  Altered mental status  S/p recent rotator cuff sx  Aspiration pneumonia    ////    Cont asa, coreg and heparin.   Inc lisinopril  Inc atorovastatin  Cabg next week  I spoke to his wife      Cristin Tabor MD  10/15/2020 8:17 AM

## 2020-10-15 NOTE — PROGRESS NOTES
Problem: Dysphagia (Adult)  Goal: *Acute Goals and Plan of Care (Insert Text)  Outcome: Progressing Towards Goal  Note: LTG: Patient will tolerate least restrictive diet without overt signs or symptoms of airway compromise. MET 10/15/20  STG: Patient will po trials with SLP without overt signs or symptoms of airway compromise. MET 10/15/20  STG: Patient will participate in modified barium swallow study as clinically indicated. NOT INDICATED      SPEECH LANGUAGE PATHOLOGY: DYSPHAGIA- Daily Note and Discharge    NAME/AGE/GENDER: Barbara Snowden is a 72 y.o. male  DATE: 10/15/2020  PRIMARY DIAGNOSIS: Chest pain [R07.9]  Aspiration pneumonia ([facility] [J69.0]  Delirium [R41.0]  Procedure(s) (LRB):  CORONARY ARTERY BYPASS GRAFT (CABG)/ 322 (N/A)    ICD-10: Treatment Diagnosis: R13.12 Dysphagia, Oropharyngeal Phase    RECOMMENDATIONS   DIET:    continue prescribed diet   PO:  Regular   Liquids:  regular thin    MEDICATIONS: With liquid     ASPIRATION PRECAUTIONS  · Slow rate of intake  · Small bites/sips  · Upright at 90 degrees during meal     COMPENSATORY STRATEGIES/MODIFICATIONS  · None     EDUCATION:  · Recommendations discussed with RN and patient     RECOMMENDATIONS for CONTINUED SPEECH THERAPY:   No further speech therapy indicated at this time. ASSESSMENT   Patient presents with no overt s/sx of airway compromise. Mildly impulsive with self-feeding, but verbalizes independent awareness and need to reduce bolus size. No s/sx of oropharyngeal dysphagia identified. Recommend regular diet/thin liquids. Medications whole with liquid wash. No additional speech therapy indicated at this time. REHABILITATION POTENTIAL FOR STATED GOALS: Good    PLAN    FREQUENCY/DURATION: No further speech therapy indicated at this time as oropharyngeal swallow function is within normal limits. - Recommendations for next treatment session: Next treatment will address po trials    SUBJECTIVE   Sitting up on side of bed. Sitter at bedside. He reports feeling \"funny. I have been hallucinating\". Denies any dysphagia. Has been upgraded to GI soft diet since last ST session. History of Present Injury/Illness: Mr. Asim Denton  has a past medical history of AION (acute ischemic optic neuropathy), AION (acute ischemic optic neuropathy), bilateral, Former smoker, stopped smoking in distant past, Hepatitis A (1980), Hypercholesterolemia, Hypertension, Prostate cancer ([facility] (2009), and Wears hearing aid in both ears. . He also  has a past surgical history that includes hx tonsil and adenoidectomy; hx heart catheterization (2011); and hx prostate surgery (11/2016). Problem List:  (Impairments causing functional limitations):  1. Oropharyngeal dysphagia. Orientation:   Person    Pain: Pain Scale 1: Numeric (0 - 10)  Pain Intensity 1: 0  Pain Location 1: Incisional  Pain Intervention(s) 1: Medication (see MAR)(Morphine 2 mg given IVP, slowly.)      OBJECTIVE   Swallow treatment:   Patient sitting on side of bed- self feeding trials of mixed consistencies, solid textures, and thin liquids via straw. Mildly impulsive with mixed consistencies with pieces of fruit falling to the floor. He independently stated \"I need to slow down. I am making a mess\". He then reduced bolus size and rate of intake. Appropriate oral prep and clearing. Single swallow per bolus likely indicating adequate pharyngeal clearing. Serial sips of thin liquids consumed via straw without s/sx of airway compromise.      INTERDISCIPLINARY COLLABORATION: Registered Nurse  PRECAUTIONS/ALLERGIES: Hydrocodone; Codeine; and Oxycodone     Tool Used: Dysphagia Outcome and Severity Scale (KELLY)    Score Comments   Normal Diet  [x] 7 With no strategies or extra time needed   Functional Swallow  [] 6 May have mild oral or pharyngeal delay   Mild Dysphagia  [] 5 Which may require one diet consistency restricted    Mild-Moderate Dysphagia  [] 4 With 1-2 diet consistencies restricted Moderate Dysphagia  [] 3 With 2 or more diet consistencies restricted   Moderate-Severe Dysphagia  [] 2 With partial PO strategies (trials with ST only)   Severe Dysphagia  [] 1 With inability to tolerate any PO safely      Score:  Initial:2 Most Recent: 7 (Date 10/15/20 )   Interpretation of Tool: The Dysphagia Outcome and Severity Scale (KELLY) is a simple, easy-to-use, 7-point scale developed to systematically rate the functional severity of dysphagia based on objective assessment and make recommendations for diet level, independence level, and type of nutrition. Current Medications:   No current facility-administered medications on file prior to encounter. Current Outpatient Medications on File Prior to Encounter   Medication Sig Dispense Refill    hydroCHLOROthiazide (HYDRODIURIL) 12.5 mg tablet Take 1 Tab by mouth daily.  HYDROcodone-acetaminophen (NORCO) 7.5-325 mg per tablet       lisinopriL (PRINIVIL, ZESTRIL) 30 mg tablet Take 1 Tab by mouth daily.  meloxicam (MOBIC) 15 mg tablet Hold for surgery-- states not taking      zolpidem (AMBIEN) 10 mg tablet Take 1 Tab by mouth nightly as needed. States not taking      lidocaine (Lidoderm) 5 % Apply patch to the affected area for 12 hours a day and remove for 12 hours a day. 5 Each 0    naproxen (Naprosyn) 500 mg tablet Take 1 Tab by mouth two (2) times daily (with meals) for 10 days. (Patient taking differently: Take 500 mg by mouth two (2) times daily (with meals). Hold for surgery- instructed 10/7/20  2200) 20 Tab 0    atorvastatin (LIPITOR) 40 mg tablet TAKE 1 TABLET BY MOUTH EVERY DAY AT NIGHT (Patient taking differently: Take 40 mg by mouth nightly. TAKE 1 TABLET BY MOUTH EVERY DAY AT NIGHT) 90 Tab 1    omega-3 fatty acids/fish oil (FISH OIL OMEGA 3-6-9 PO) Take  by mouth.          After treatment position/precautions:  · Upright in bed  · sitter at bedside  · Call light within reach    Total Treatment Duration:   Time In: 7913  Time Out: [address], TYREE, CCC-SLP

## 2020-10-15 NOTE — PROGRESS NOTES
Care Management Interventions  PCP Verified by CM: Blas Michael DO)  Mode of Transport at Discharge: Other (see comment)(Family)  Transition of Care Consult (CM Consult): Discharge Planning  Discharge Durable Medical Equipment: No  Physical Therapy Consult: No  Occupational Therapy Consult: No  Speech Therapy Consult: Yes  Current Support Network: Lives with Spouse, Own Home  Confirm Follow Up Transport: Family  The Patient and/or Patient Representative was Provided with a Choice of Provider and Agrees with the Discharge Plan?: No  Freedom of Choice List was Provided with Basic Dialogue that Supports the Patient's Individualized Plan of Care/Goals, Treatment Preferences and Shares the Quality Data Associated with the Providers?: No  [place] Resource Information Provided?: No  Discharge Location  Discharge Placement: Unable to determine at this time    CM met with pt's spouse for introduction and to offer any assistance for possible CM needs. 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 We have no needs, thank you\". CM noted sitter at the bedside.

## 2020-10-15 NOTE — PROGRESS NOTES
SPEECH PATHOLOGY NOTE:    Patient unavailable at time of SLP attempt. Will follow up at later time as patient is available.      Erna Appiah, [employer] [facility], [facility] PEYTON JAMES, CCC-SLP

## 2020-10-15 NOTE — CONSULTS
Santiago Alexander. MD Keira Maldonado. Bryan Mills MD             [facility]         10/11/2020        1950    REFERRING PHYSICIAN:  Dr. Leia Jeffrey:  The patient is a 72 y.o. male who underwent recent rotator cuff surgery. He was discharged on 10/9 following this procedure. He states he recalls that his pain was not controlled and he was nauseated with severe pain. He does not recall what brought him back to the ER. Per chart review, he presented with chest tightness and unable to take a deep breath. He was admitted to [facility]. He became uncooperative with staff. He was started on IV antibiotics. He refused echo. He became combative and required restraints. He was transferred to [facility] and was seen by cardiology for elevated troponins. He continued to require restraints and Haldol for agitation and delirium. His mental status improved. He did not require restraints as of 10/14. He underwent LHC on 10/14 that showed severe multivessel disease. He is still requiring a sitter. He currently denies any chest pain. He states he does not recall ever having chest pain. He states he does have FH of CAD in his father. He states he is having hallucinations \"all the time. \" He would not elaborate on this and would frequently not complete sentences on interview. Unable to obtain a history given current mental status.        Past Medical History:   Diagnosis Date    AION (acute ischemic optic neuropathy)     AION (acute ischemic optic neuropathy), bilateral     Former smoker, stopped smoking in distant past     1971 quit    Hepatitis A 1980    Hypercholesterolemia     Hypertension     Prostate cancer ([facility] [address] 2009    Wears hearing aid in both ears        Past Surgical History:   Procedure Laterality Date    HX HEART CATHETERIZATION  2011    no intervention, normal    HX PROSTATE SURGERY  11/2016    HX TONSIL AND ADENOIDECTOMY         Family History   Problem Relation Age of Onset    Diabetes Mother     Heart Disease Father     Cancer Sister     Heart Disease Sister        Social History     Socioeconomic History    Marital status:      Spouse name: Not on file    Number of children: Not on file    Years of education: Not on file    Highest education level: Not on file   Occupational History    Not on file   Social Needs    Financial resource strain: Not on file    Food insecurity     Worry: Not on file     Inability: Not on file   [employer] needs     Medical: Not on file     Non-medical: Not on file   Tobacco Use    Smoking status: Former Smoker     Types: Cigarettes     Last attempt to quit: 1971     Years since quittin.8    Smokeless tobacco: Never Used   Substance and Sexual Activity    Alcohol use: No    Drug use: No    Sexual activity: Yes     Partners: Female     Birth control/protection: None     Comment:    Lifestyle    Physical activity     Days per week: Not on file     Minutes per session: Not on file    Stress: Not on file   Relationships    Social connections     Talks on phone: Not on file     Gets together: Not on file     Attends Yazdanism service: Not on file     Active member of club or organization: Not on file     Attends meetings of clubs or organizations: Not on file     Relationship status: Not on file    Intimate partner violence     Fear of current or ex partner: Not on file     Emotionally abused: Not on file     Physically abused: Not on file     Forced sexual activity: Not on file   Other Topics Concern    Not on file   Social History Narrative    Not on file       Allergies   Allergen Reactions    Hydrocodone Itching     Pt states incorrect/ allergy is to oxycodone. Pt states he can tolerate hydrocodone 10/8/20 Please remove    Codeine Other (comments)     convulsions    Oxycodone Itching     Can tolerate Hydrocodone       No current facility-administered medications on file prior to encounter.       Current Outpatient Medications on File Prior to Encounter   Medication Sig Dispense Refill    hydroCHLOROthiazide (HYDRODIURIL) 12.5 mg tablet Take 1 Tab by mouth daily.  HYDROcodone-acetaminophen (NORCO) 7.5-325 mg per tablet       lisinopriL (PRINIVIL, ZESTRIL) 30 mg tablet Take 1 Tab by mouth daily.  meloxicam (MOBIC) 15 mg tablet Hold for surgery-- states not taking      zolpidem (AMBIEN) 10 mg tablet Take 1 Tab by mouth nightly as needed. States not taking      lidocaine (Lidoderm) 5 % Apply patch to the affected area for 12 hours a day and remove for 12 hours a day. 5 Each 0    naproxen (Naprosyn) 500 mg tablet Take 1 Tab by mouth two (2) times daily (with meals) for 10 days. (Patient taking differently: Take 500 mg by mouth two (2) times daily (with meals). Hold for surgery- instructed 10/7/20  2200) 20 Tab 0    atorvastatin (LIPITOR) 40 mg tablet TAKE 1 TABLET BY MOUTH EVERY DAY AT NIGHT (Patient taking differently: Take 40 mg by mouth nightly. TAKE 1 TABLET BY MOUTH EVERY DAY AT NIGHT) 90 Tab 1    omega-3 fatty acids/fish oil (FISH OIL OMEGA 3-6-9 PO) Take  by mouth. REVIEW OF SYSTEMS:  Review of Systems   Constitution: Negative for chills, fever, malaise/fatigue, weight gain and weight loss. HENT: Negative for ear pain, hearing loss, nosebleeds, sore throat and tinnitus. Eyes: Negative for blurred vision, vision loss in left eye and vision loss in right eye. Cardiovascular: Negative for chest pain, dyspnea on exertion, leg swelling, near-syncope, orthopnea, palpitations, paroxysmal nocturnal dyspnea and syncope. Respiratory: Negative for cough, hemoptysis, shortness of breath, sputum production and wheezing. Endocrine: Negative for cold intolerance, heat intolerance and polydipsia. Hematologic/Lymphatic: Does not bruise/bleed easily. Skin: Negative for color change and rash. Musculoskeletal: Negative for back pain, joint pain, joint swelling and myalgias. Gastrointestinal: Negative for abdominal pain, constipation, diarrhea, dysphagia, heartburn, hematemesis, melena, nausea and vomiting. Genitourinary: Negative for dysuria, frequency, hematuria and urgency. Neurological: Negative for difficulty with concentration, dizziness, headaches, light-headedness, numbness, paresthesias, seizures, vertigo and weakness. Psychiatric/Behavioral: Positive for hallucinations. Negative for altered mental status and depression.        Physical Exam  Vitals:    10/15/20 0308 10/15/20 0400 10/15/20 0524 10/15/20 0858   BP:   (!) 145/54 (!) 172/76   Pulse: 71 73 71 79   Resp: 18  18 18   Temp:   98.3 °F (36.8 °C) 98.3 °F (36.8 °C)   SpO2:   95% 95%   Weight:   201 lb (91.2 kg)        Physical Exam:  General: Well Developed, Well Nourished, No Acute Distress  HEENT: Normocephalic, pupils equal and round, no scleral icterus  Neck: supple, no JVD  Chest wall: No deformity  Heart: S1S2 with RRR without murmurs or gallops  Lungs: Clear throughout auscultation bilaterally without adventitious sounds  Abd: soft, nontender, nondistended, with good bowel sounds, no pulsatile masses  Ext: warm, no edema, calves supple/nontender, pulses 2+ bilaterally  Skin: warm and dry, no rashes, cyanosis, jaundice, ecchymoses or evidence of skin breakdown  Psychiatric: Normal mood  Neurologic: Alert to verbal, appears confused     Labs:   Recent Labs     10/15/20  0426 10/14/20  0444    140   K 3.7 3.4*   MG 2.1 1.8   BUN 12 10   CREA 1.07 0.96   * 119*   WBC 9.0 12.1*   HGB 11.5* 12.2*   HCT 34.9* 36.0*    215       Lab Results   Component Value Date/Time    Cholesterol, total 137 10/11/2020 03:51 AM    HDL Cholesterol 39 (L) 10/11/2020 03:51 AM    LDL, calculated 69.6 10/11/2020 03:51 AM    VLDL, calculated 28.4 (H) 10/11/2020 03:51 AM    Triglyceride 142 10/11/2020 03:51 AM    CHOL/HDL Ratio 3.5 10/11/2020 03:51 AM       Assessment:     Principal Problem:    Chest pain (10/10/2020)  Active Problems:  Mixed hyperlipidemia (4/4/2017)  Elevated troponin (10/10/2020)  Aspiration pneumonia ([facility] [address] (10/10/2020)  Leukocytosis (10/10/2020)  Sepsis ([facility] [address] (10/11/2020)  Acute delirium (10/11/2020)  Hypertensive urgency, malignant (10/11/2020)  Acute kidney injury ([facility] [address] (10/11/2020)  Delirium (10/12/2020)    Plan:     Currently unable to obtain history from patient. He is not completing sentences and unable to answer most questions. He does not recall what brought him to the hospital. He reports hallucinations this morning. Given pt's current mental status, unable to discuss CABG surgery and certainly unable to consent pt. Will follow.         Fred Avila PA-C

## 2020-10-15 NOTE — ROUTINE PROCESS
Bedside and Verbal report given to self by Vangie Packer RN. Report included SBAR, Kardex, ED Summary, Procedure Summary, Intake and Output and Cardiac Rhythm SR -SB. Heparin gtt verified on MAR at bedside with off-going RN. Patient in bed with bed alarm in place and active. Sitter at bedside.

## 2020-10-16 VITALS
WEIGHT: 201.6 LBS | TEMPERATURE: 98 F | OXYGEN SATURATION: 95 % | RESPIRATION RATE: 18 BRPM | HEART RATE: 77 BPM | BODY MASS INDEX: 29.77 KG/M2 | SYSTOLIC BLOOD PRESSURE: 197 MMHG | DIASTOLIC BLOOD PRESSURE: 89 MMHG

## 2020-10-16 LAB
BASOPHILS # BLD: 0.1 K/UL (ref 0–0.2)
BASOPHILS NFR BLD: 1 % (ref 0–2)
DIFFERENTIAL METHOD BLD: ABNORMAL
EOSINOPHIL # BLD: 0.7 K/UL (ref 0–0.8)
EOSINOPHIL NFR BLD: 7 % (ref 0.5–7.8)
ERYTHROCYTE [DISTWIDTH] IN BLOOD BY AUTOMATED COUNT: 13.9 % (ref 11.9–14.6)
HCT VFR BLD AUTO: 36.8 % (ref 41.1–50.3)
HGB BLD-MCNC: 12.4 G/DL (ref 13.6–17.2)
IMM GRANULOCYTES # BLD AUTO: 0.2 K/UL (ref 0–0.5)
IMM GRANULOCYTES NFR BLD AUTO: 2 % (ref 0–5)
LYMPHOCYTES # BLD: 2.5 K/UL (ref 0.5–4.6)
LYMPHOCYTES NFR BLD: 23 % (ref 13–44)
MCH RBC QN AUTO: 29 PG (ref 26.1–32.9)
MCHC RBC AUTO-ENTMCNC: 33.7 G/DL (ref 31.4–35)
MCV RBC AUTO: 86.2 FL (ref 79.6–97.8)
MONOCYTES # BLD: 1 K/UL (ref 0.1–1.3)
MONOCYTES NFR BLD: 10 % (ref 4–12)
NEUTS SEG # BLD: 6.2 K/UL (ref 1.7–8.2)
NEUTS SEG NFR BLD: 58 % (ref 43–78)
NRBC # BLD: 0 K/UL (ref 0–0.2)
PLATELET # BLD AUTO: 232 K/UL (ref 150–450)
PMV BLD AUTO: 11.1 FL (ref 9.4–12.3)
RBC # BLD AUTO: 4.27 M/UL (ref 4.23–5.6)
UFH PPP CHRO-ACNC: 0.68 IU/ML (ref 0.3–0.7)
UFH PPP CHRO-ACNC: 0.87 IU/ML (ref 0.3–0.7)
WBC # BLD AUTO: 10.7 K/UL (ref 4.3–11.1)

## 2020-10-16 PROCEDURE — 36415 COLL VENOUS BLD VENIPUNCTURE: CPT

## 2020-10-16 PROCEDURE — 74011250636 HC RX REV CODE- 250/636: Performed by: HOSPITALIST

## 2020-10-16 PROCEDURE — 99232 SBSQ HOSP IP/OBS MODERATE 35: CPT | Performed by: INTERNAL MEDICINE

## 2020-10-16 PROCEDURE — 97530 THERAPEUTIC ACTIVITIES: CPT

## 2020-10-16 PROCEDURE — 74011250637 HC RX REV CODE- 250/637: Performed by: HOSPITALIST

## 2020-10-16 PROCEDURE — 74011250637 HC RX REV CODE- 250/637: Performed by: INTERNAL MEDICINE

## 2020-10-16 PROCEDURE — 74011250636 HC RX REV CODE- 250/636: Performed by: INTERNAL MEDICINE

## 2020-10-16 PROCEDURE — 85520 HEPARIN ASSAY: CPT

## 2020-10-16 PROCEDURE — 74011000250 HC RX REV CODE- 250: Performed by: INTERNAL MEDICINE

## 2020-10-16 PROCEDURE — 85025 COMPLETE CBC W/AUTO DIFF WBC: CPT

## 2020-10-16 PROCEDURE — 74011000258 HC RX REV CODE- 258: Performed by: HOSPITALIST

## 2020-10-16 RX ORDER — ATORVASTATIN CALCIUM 80 MG/1
80 TABLET, FILM COATED ORAL
Qty: 90 TAB | Refills: 0 | Status: SHIPPED | OUTPATIENT
Start: 2020-10-16 | End: 2020-10-21

## 2020-10-16 RX ORDER — GUAIFENESIN 100 MG/5ML
81 LIQUID (ML) ORAL DAILY
Qty: 90 TAB | Refills: 0 | Status: SHIPPED | OUTPATIENT
Start: 2020-10-17

## 2020-10-16 RX ORDER — NITROGLYCERIN 0.4 MG/1
0.4 TABLET SUBLINGUAL
Qty: 10 TAB | Refills: 0 | Status: SHIPPED | OUTPATIENT
Start: 2020-10-16 | End: 2020-10-21 | Stop reason: SDUPTHER

## 2020-10-16 RX ORDER — METOPROLOL SUCCINATE 100 MG/1
100 TABLET, EXTENDED RELEASE ORAL DAILY
Qty: 90 TAB | Refills: 0 | Status: SHIPPED | OUTPATIENT
Start: 2020-10-16 | End: 2020-10-29

## 2020-10-16 RX ORDER — ENOXAPARIN SODIUM 100 MG/ML
90 INJECTION SUBCUTANEOUS EVERY 12 HOURS
Status: DISCONTINUED | OUTPATIENT
Start: 2020-10-16 | End: 2020-10-16 | Stop reason: HOSPADM

## 2020-10-16 RX ORDER — LISINOPRIL 10 MG/1
10 TABLET ORAL 2 TIMES DAILY
Qty: 60 TAB | Refills: 0 | Status: SHIPPED | OUTPATIENT
Start: 2020-10-16 | End: 2020-10-21

## 2020-10-16 RX ADMIN — PIPERACILLIN SODIUM AND TAZOBACTAM SODIUM 3.38 G: 3; .375 INJECTION, POWDER, LYOPHILIZED, FOR SOLUTION INTRAVENOUS at 08:23

## 2020-10-16 RX ADMIN — PIPERACILLIN SODIUM AND TAZOBACTAM SODIUM 3.38 G: 3; .375 INJECTION, POWDER, LYOPHILIZED, FOR SOLUTION INTRAVENOUS at 03:13

## 2020-10-16 RX ADMIN — Medication 5 ML: at 06:14

## 2020-10-16 RX ADMIN — LISINOPRIL 10 MG: 5 TABLET ORAL at 08:24

## 2020-10-16 RX ADMIN — CARVEDILOL 12.5 MG: 12.5 TABLET, FILM COATED ORAL at 08:24

## 2020-10-16 RX ADMIN — QUETIAPINE FUMARATE 12.5 MG: 25 TABLET, FILM COATED ORAL at 09:11

## 2020-10-16 RX ADMIN — FAMOTIDINE 20 MG: 10 INJECTION INTRAVENOUS at 08:23

## 2020-10-16 RX ADMIN — ENOXAPARIN SODIUM 90 MG: 100 INJECTION SUBCUTANEOUS at 09:11

## 2020-10-16 RX ADMIN — ASPIRIN 81 MG: 81 TABLET, CHEWABLE ORAL at 08:24

## 2020-10-16 NOTE — PROGRESS NOTES
[facility] CARDIOLOGY PROGRESS NOTE           10/16/2020 8:17 AM    Admit Date: 10/11/2020      Subjective:   No cp or sob    ROS:  Cardiovascular:  As noted above    Objective:      Vitals:    10/15/20 1649 10/15/20 2128 10/16/20 0056 10/16/20 0503   BP: (!) 155/74 132/63 (!) 148/79 (!) 157/75   Pulse: 60 60 65 69   Resp: 18 17 17 17   Temp: 97.7 °F (36.5 °C) 98 °F (36.7 °C) 98.6 °F (37 °C) 98.3 °F (36.8 °C)   SpO2: 94% 100% 96% 95%   Weight:    91.4 kg (201 lb 9.6 oz)       Physical Exam:  General-No Acute Distress, talkative, not confused  Neck- supple, no JVD  CV- regular rate and rhythm no MRG  Lung- clear bilaterally  Abd- soft, nontender, nondistended  Ext- no edema bilaterally.   Skin- warm and dry    Data Review:   Recent Labs     10/16/20  0350 10/15/20  0426 10/14/20  0444   NA  --  142 140   K  --  3.7 3.4*   MG  --  2.1 1.8   BUN  --  12 10   CREA  --  1.07 0.96   GLU  --  112* 119*   WBC 10.7 9.0 12.1*   HGB 12.4* 11.5* 12.2*   HCT 36.8* 34.9* 36.0*    202 215       Assessment/Plan:     Principal Problem:    Chest pain (10/10/2020)        Active Problems:    Mixed hyperlipidemia (4/4/2017)          Elevated troponin (10/10/2020)          Aspiration pneumonia (HCC) (10/10/2020)          Leukocytosis (10/10/2020)          Sepsis ([address] (10/11/2020)          Acute delirium (10/11/2020)          Hypertensive urgency, malignant (10/11/2020)          Acute kidney injury ([address] (10/11/2020)          Delirium (10/12/2020)      ////    Stop heparin  Start Lovenox          Jazmin Andrews MD  10/16/2020 8:17 AM

## 2020-10-16 NOTE — PROGRESS NOTES
Pt. Would like to leave AMA, per psych consult pt. Does not need any follow up with them. Dr. Luigi Campuzano notified of patients wishes and is to come speak with them.
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
## 2020-10-16 NOTE — ROUTINE PROCESS
Bedside and Verbal shift change report to be given to self (oncoming nurse) by Oleksandr Garcia RN (offgoing nurse). Report included the following information SBAR, Kardex and MAR. Heparin gtt verified with RN.

## 2020-10-16 NOTE — PROGRESS NOTES
Pt. Able to answer all orientation questions correctly without hesitations, would like to leave AMA. I see no reason to keep him here against his wishes. Dr. Leal Staff at the bedside as well as Dr. Claude Mckinney explaining to him the reasons to stay and urged to not leave against medical advice. Pt. States that he does not want any medications, both physicians went ahead and wrote prescriptions for patient and have been given to the wife. Wife requests follow up appointment with [facility] cardiology. Will call personally and set up appointment. Patient left prior to wife leaving. Physicians at the bedside.

## 2020-10-16 NOTE — PROGRESS NOTES
Wife at the bedside very angry and tearful. I have spoken with her twice today and updated her on the plan of care. I have also spoken to both CV surgery JAMAICA Mercado and Dr. Kevin Hyde to which they have been in the patient room to speak with the wife. Will continue to monitor.

## 2020-10-16 NOTE — PROGRESS NOTES
Problem: Mobility Impaired (Adult and Pediatric)  Goal: *Acute Goals and Plan of Care (Insert Text)  Description: LTG:  (1.)Mr. Abarca will move from supine to sit and sit to supine , scoot up and down and roll side to side in flat bed without siderails with  INDEPENDENT within 7 day(s). (2.)Mr. Abarca will perform all functional transfers with  INDEPENDENT using the least restrictive/no device within 7 day(s). (3.)Mr. Abarca will ambulate with  INDEPENDENT for 500+ feet with normal vital sign response with the least restrictive/no device within 7 day(s). (4.)Mr. Abarca will ambulate up/down 1 steps with bilateral  railing with  MODIFIED INDEPENDENCE with no device within 7 day(s). Outcome: Progressing Towards Goal    PHYSICAL THERAPY: Daily Note, Discharge and AM 10/16/2020  INPATIENT: PT Visit Days : 1  R shld s/p rotator cuff repair  [place] / [place] / Product Type: Medicare /       NAME/AGE/GENDER: Duran Quijano is a 72 y.o. male   PRIMARY DIAGNOSIS: Chest pain [R07.9]  Aspiration pneumonia ([facility] [address] [J69.0]  Delirium [R41.0] Chest pain Chest pain       ICD-10: Treatment Diagnosis:    · Other abnormalities of gait and mobility (R26.89)   Precaution/Allergies:  Hydrocodone; Codeine; and Oxycodone      ASSESSMENT:     Mr. Guido Ballard presents walking around his room with IV. I know him personally through his profession. He is alert and seems mostly oriented but certainly not fully. He recently had RCR and did have his sling in the room so we worked on donning it appropriately. He walked the whole floor without any assistance or difficulty. He said he was going home today but could not really tell me much about his heart issues. He is safe to go home mobility wise but due to cognition would need supervision. This section established at most recent assessment   PROBLEM LIST (Impairments causing functional limitations):  1.  Decreased Transfer Abilities  2. Decreased Ambulation Ability/Technique  3. Decreased Balance  4. Decreased Activity Tolerance   INTERVENTIONS PLANNED: (Benefits and precautions of physical therapy have been discussed with the patient.)  1. Balance Exercise  2. Bed Mobility  3. Gait Training  4. Therapeutic Activites  5. Therapeutic Exercise/Strengthening  6. Transfer Training  7. education     TREATMENT PLAN: Frequency/Duration: 4 times a week for duration of hospital stay  Rehabilitation Potential For Stated Goals: Excellent     REHAB RECOMMENDATIONS (at time of discharge pending progress):    Placement: It is my opinion, based on this patient's performance to date, that Mr. Margret Akhtar may benefit from [address] after discharge due to the functional deficits listed above that are likely to improve with skilled rehabilitation because he/she has multiple medical issues that affect his/her functional mobility in the community. Equipment:    None at this time              HISTORY:   History of Present Injury/Illness (Reason for Referral):  Per MD note, \"Patient is a 72years old male with hx of HTN, dyslipidemia, former smoker admitted on 10/10/20 for substernal chest pain radiating to left arm. Pt's EKG was unremarkable for acute ischemic changes but CT chest w contrast showed right posterior lobe infiltrate/consolidation suspicious of aspiration pneumonia. Cardiology was consulted to evaluate the pt in view of chest pain and troponin 35 --> 131 --> 600. I spoke to Dr. Shannon Guzman on 10/10, he suggested conservative treatment at this point since slight elevation in troponin could be from sepsis. However, overnight pt's troponin increased to 3153 --> 5116. Pt is also having acute delirium with behavioral disturbance, he is restless, agitated and violent towards nursing staff. He was requiring 4 point restraints and prn haldol and ativan. He is currently on IV zosyn for aspiration pneumonia. Blood culture negative so far.  Echo is still pending since pt refused it yesterday. He is currently at room air. His blood pressure has been elevated overnight along with tachycardia, which could be all from agitation and restlessness, which itself can cause hypertensive emergency resulting in demand ischemia. I have discussed with Dr. Amparo Campos from cardiology team, they will reassess him once pt is downtown to makes a decision on cardiac cath and ACS protocol. Pt is at high risk but is hemodynamically stable for transfer to [address] [place] [address]. \"  Past Medical History/Comorbidities:   Mr. Paola Conn  has a past medical history of AION (acute ischemic optic neuropathy), AION (acute ischemic optic neuropathy), bilateral, Former smoker, stopped smoking in distant past, Hepatitis A (1980), Hypercholesterolemia, Hypertension, Prostate cancer ([facility] [address] (2009), and Wears hearing aid in both ears. Mr. Paola Conn  has a past surgical history that includes hx tonsil and adenoidectomy; hx heart catheterization (2011); and hx prostate surgery (11/2016). Social History/Living Environment:   Home Environment: Private residence  One/Two Story Residence: Two story  Living Alone: No  Support Systems: Family member(s)  Patient Expects to be Discharged to[de-identified] Private residence  Current DME Used/Available at Home: jaxson Albrecht  Prior Level of Function/Work/Activity:  Patient lives with his wife Otis Mejia. 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       Number of Personal Factors/Comorbidities that affect the Plan of Care: 3+: HIGH COMPLEXITY   EXAMINATION:   Most Recent Physical Functioning:   Gross Assessment:                  Posture:     Balance:    Bed Mobility:     Wheelchair Mobility:     Transfers:  Sit to Stand: Independent  Stand to Sit: Independent  Gait:     Speed/Nelda: Pace decreased (<100 feet/min)  Distance (ft): 500 Feet (ft)  Assistive Device: (sling s/p RCR)  Ambulation - Level of Assistance: Independent      Body Structures Involved:  1. Metabolic Body Functions Affected:  1. Neuromusculoskeletal  2. Movement Related Activities and Participation Affected:  1. Mobility  2. Self Care  3. Domestic Life  4. Community, Social and [place]   Number of elements that affect the Plan of Care: 4+: HIGH COMPLEXITY   CLINICAL PRESENTATION:   Presentation: Evolving clinical presentation with changing clinical characteristics: MODERATE COMPLEXITY   CLINICAL DECISION MAKIN [facility] Inpatient Short Form  How much difficulty does the patient currently have. .. Unable A Lot A Little None   1. Turning over in bed (including adjusting bedclothes, sheets and blankets)? [] 1   [x] 2   [] 3   [] 4   2. Sitting down on and standing up from a chair with arms ( e.g., wheelchair, bedside commode, etc.)   [] 1   [] 2   [x] 3   [] 4   3. Moving from lying on back to sitting on the side of the bed? [] 1   [x] 2   [] 3   [] 4   How much help from another person does the patient currently need. .. Total A Lot A Little None   4. Moving to and from a bed to a chair (including a wheelchair)? [] 1   [] 2   [x] 3   [] 4   5. Need to walk in hospital room? [] 1   [] 2   [x] 3   [] 4   6. Climbing 3-5 steps with a railing? [] 1   [x] 2   [] 3   [] 4   © , Trustees of [address] [place] [address], under license to [employer]. All rights reserved      Score:  Initial: 15 Most Recent: X (Date: -- )    Interpretation of Tool:  Represents activities that are increasingly more difficult (i.e. Bed mobility, Transfers, Gait). Medical Necessity:     · Patient is expected to demonstrate progress in   · strength, range of motion, balance, and functional technique  ·  to   · increase independence with   and improve safety during all functional mobility  · .   Reason for Services/Other Comments:  · Patient continues to require skilled intervention due to   · medical complications and patient unable to attend/participate in therapy as expected  · . Use of outcome tool(s) and clinical judgement create a POC that gives a: Clear prediction of patient's progress: LOW COMPLEXITY            TREATMENT:   (In addition to Assessment/Re-Assessment sessions the following treatments were rendered)   Pre-treatment Symptoms/Complaints:  \"I'm going home in a few minutes\"  Pain: Initial:   Pain Intensity 1: 0  Post Session:  0     Therapeutic Activity: (    20 minutes): Therapeutic activities including ambulation in hallway to improve mobility, strength, and balance. Required minimal   to promote static and dynamic balance in standing. Braces/Orthotics/Lines/Etc:   · IV  · jacobs catheter  · monitor   · O2 Device: Room air  Treatment/Session Assessment:    · Response to Treatment:  pleasant and cooperative.   · Interdisciplinary Collaboration:   o Physical Therapy Assistant  o Registered Nurse  · After treatment position/precautions:   o Nurse at bedside  o standing in room   · Compliance with Program/Exercises: Compliant all of the time  · Recommendations/Intent for next treatment session:  NA  Total Treatment Duration:  PT Patient Time In/Time Out  Time In: 0900  Time Out: 0920    Joseph Ly PTA

## 2020-10-16 NOTE — ROUTINE PROCESS
Bedside and Verbal report given to Kaiden Thompson RN by self. Report included SBAR, Kardex, ED summary, procedure summary, recent results and cardiac rhythm SB-SR. Heparin gtt verified on MAR at bedside with on-coming RN.

## 2020-10-16 NOTE — PROGRESS NOTES
CTS-pt much more alert today but continues to appear confused. He is talkative but still does not answer questions appropriately. Psych consult completed but apparently no recommendations were made, no report available yet. His delirium has improved but still unable to discuss any details of CABG surgery or obtain consent. Wife is tearful at bedside as she thinks he will be discharged today. Pt states he is \"ready to climb out of the window. \"     Willem Reyna PA-C

## 2020-10-27 NOTE — PROGRESS NOTES
Physician Progress Note      PATIENT:               Daniel Omer  CSN #:                  [id number]  :                       1950  ADMIT DATE:       10/11/2020 9:15 AM  DISCH DATE:        10/16/2020 11:51 AM  RESPONDING  PROVIDER #:        Darlyn ROSARIO MD          QUERY TEXT:    Pt presented with sepsis due to aspiration pneumonia. They are noted to be s/p rotator cuff repair surgery on . After study, can the suspected etiology of the patient's pneumonia be further specified as: The medical record reflects the following:  Risk Factors: prior rotator cuff surgery  Clinical Indicators: Documentation of aspiration pneumonia  Treatment: Antibiotics    Thank Rene Martinez RN CDI  [phone]  Options provided:  -- Pneumonia is a postoperative complication of the patients recent procedure  -- Pneumonia is unrelated to the recent surgery  -- Other - I will add my own diagnosis  -- Disagree - Not applicable / Not valid  -- Disagree - Clinically unable to determine / Unknown  -- Refer to Clinical Documentation Reviewer    PROVIDER RESPONSE TEXT:    The aspiration pneumonia is unrelated to the recent surgery.     Query created by: Shay White on 10/22/2020 7:05 PM      Electronically signed by:  Darlyn ROSARIO MD 10/27/2020 12:18 PM

## 2020-10-28 ENCOUNTER — HOSPITAL ENCOUNTER (OUTPATIENT)
Dept: CARDIAC CATH/INVASIVE PROCEDURES | Age: 70
Discharge: HOME | End: 2020-10-29
Attending: INTERNAL MEDICINE | Admitting: INTERNAL MEDICINE
Payer: MEDICARE

## 2020-10-28 DIAGNOSIS — I25.10 CAD S/P PERCUTANEOUS CORONARY ANGIOPLASTY: ICD-10-CM

## 2020-10-28 DIAGNOSIS — N17.9 ACUTE KIDNEY INJURY (HCC): ICD-10-CM

## 2020-10-28 DIAGNOSIS — Z98.61 CAD S/P PERCUTANEOUS CORONARY ANGIOPLASTY: ICD-10-CM

## 2020-10-28 LAB
ACT BLD: 224 SECS (ref 70–128)
ACT BLD: 230 SECS (ref 70–128)
ACT BLD: 235 SECS (ref 70–128)
ANION GAP SERPL CALC-SCNC: 7 MMOL/L (ref 7–16)
ATRIAL RATE: 68 BPM
BUN SERPL-MCNC: 19 MG/DL (ref 8–23)
CALCIUM SERPL-MCNC: 9.5 MG/DL (ref 8.3–10.4)
CALCULATED P AXIS, ECG09: 54 DEGREES
CALCULATED R AXIS, ECG10: -37 DEGREES
CALCULATED T AXIS, ECG11: 65 DEGREES
CHLORIDE SERPL-SCNC: 109 MMOL/L (ref 98–107)
CO2 SERPL-SCNC: 25 MMOL/L (ref 21–32)
CREAT SERPL-MCNC: 1.37 MG/DL (ref 0.8–1.5)
DIAGNOSIS, 93000: NORMAL
ERYTHROCYTE [DISTWIDTH] IN BLOOD BY AUTOMATED COUNT: 13.2 % (ref 11.9–14.6)
GLUCOSE SERPL-MCNC: 87 MG/DL (ref 65–100)
HCT VFR BLD AUTO: 38.9 % (ref 41.1–50.3)
HGB BLD-MCNC: 12.6 G/DL (ref 13.6–17.2)
INR PPP: 1
MAGNESIUM SERPL-MCNC: 1.8 MG/DL (ref 1.8–2.4)
MCH RBC QN AUTO: 28.7 PG (ref 26.1–32.9)
MCHC RBC AUTO-ENTMCNC: 32.4 G/DL (ref 31.4–35)
MCV RBC AUTO: 88.6 FL (ref 79.6–97.8)
NRBC # BLD: 0 K/UL (ref 0–0.2)
P-R INTERVAL, ECG05: 172 MS
PLATELET # BLD AUTO: 311 K/UL (ref 150–450)
PMV BLD AUTO: 10.7 FL (ref 9.4–12.3)
POTASSIUM SERPL-SCNC: 4.5 MMOL/L (ref 3.5–5.1)
PROTHROMBIN TIME: 13.6 SEC (ref 12.5–14.7)
Q-T INTERVAL, ECG07: 472 MS
QRS DURATION, ECG06: 134 MS
QTC CALCULATION (BEZET), ECG08: 501 MS
RBC # BLD AUTO: 4.39 M/UL (ref 4.23–5.6)
SODIUM SERPL-SCNC: 141 MMOL/L (ref 138–145)
VENTRICULAR RATE, ECG03: 68 BPM
WBC # BLD AUTO: 10.3 K/UL (ref 4.3–11.1)

## 2020-10-28 PROCEDURE — C1887 CATHETER, GUIDING: HCPCS

## 2020-10-28 PROCEDURE — 92933 PRQ TRLML C ATHRC ST ANGIOP1: CPT | Performed by: INTERNAL MEDICINE

## 2020-10-28 PROCEDURE — 80048 BASIC METABOLIC PNL TOTAL CA: CPT

## 2020-10-28 PROCEDURE — 92933 PRQ TRLML C ATHRC ST ANGIOP1: CPT

## 2020-10-28 PROCEDURE — 74011250637 HC RX REV CODE- 250/637: Performed by: INTERNAL MEDICINE

## 2020-10-28 PROCEDURE — 77030012468 HC VLV BLEEDBK CNTRL ABBT -B

## 2020-10-28 PROCEDURE — C1894 INTRO/SHEATH, NON-LASER: HCPCS

## 2020-10-28 PROCEDURE — 74011250636 HC RX REV CODE- 250/636: Performed by: INTERNAL MEDICINE

## 2020-10-28 PROCEDURE — 93005 ELECTROCARDIOGRAM TRACING: CPT | Performed by: INTERNAL MEDICINE

## 2020-10-28 PROCEDURE — 85347 COAGULATION TIME ACTIVATED: CPT

## 2020-10-28 PROCEDURE — C1753 CATH, INTRAVAS ULTRASOUND: HCPCS

## 2020-10-28 PROCEDURE — 85610 PROTHROMBIN TIME: CPT

## 2020-10-28 PROCEDURE — 85027 COMPLETE CBC AUTOMATED: CPT

## 2020-10-28 PROCEDURE — 77030016699 HC CATH ANGI DX INFN1 CARD -A

## 2020-10-28 PROCEDURE — C1725 CATH, TRANSLUMIN NON-LASER: HCPCS

## 2020-10-28 PROCEDURE — C1769 GUIDE WIRE: HCPCS

## 2020-10-28 PROCEDURE — 99153 MOD SED SAME PHYS/QHP EA: CPT

## 2020-10-28 PROCEDURE — 92978 ENDOLUMINL IVUS OCT C 1ST: CPT | Performed by: INTERNAL MEDICINE

## 2020-10-28 PROCEDURE — 74011250637 HC RX REV CODE- 250/637: Performed by: PHYSICIAN ASSISTANT

## 2020-10-28 PROCEDURE — C1724 CATH, TRANS ATHEREC,ROTATION: HCPCS

## 2020-10-28 PROCEDURE — C1874 STENT, COATED/COV W/DEL SYS: HCPCS

## 2020-10-28 PROCEDURE — 74011000636 HC RX REV CODE- 636: Performed by: INTERNAL MEDICINE

## 2020-10-28 PROCEDURE — 74011000250 HC RX REV CODE- 250: Performed by: INTERNAL MEDICINE

## 2020-10-28 PROCEDURE — 77030029997 HC DEV COM RDL R BND TELE -B

## 2020-10-28 PROCEDURE — 92979 ENDOLUMINL IVUS OCT C EA: CPT

## 2020-10-28 PROCEDURE — 83735 ASSAY OF MAGNESIUM: CPT

## 2020-10-28 PROCEDURE — 92978 ENDOLUMINL IVUS OCT C 1ST: CPT

## 2020-10-28 PROCEDURE — 99152 MOD SED SAME PHYS/QHP 5/>YRS: CPT

## 2020-10-28 PROCEDURE — 92979 ENDOLUMINL IVUS OCT C EA: CPT | Performed by: INTERNAL MEDICINE

## 2020-10-28 RX ORDER — CLOPIDOGREL BISULFATE 75 MG/1
300 TABLET ORAL ONCE
Status: COMPLETED | OUTPATIENT
Start: 2020-10-28 | End: 2020-10-28

## 2020-10-28 RX ORDER — DIPHENHYDRAMINE HYDROCHLORIDE 50 MG/ML
50 INJECTION, SOLUTION INTRAMUSCULAR; INTRAVENOUS ONCE
Status: COMPLETED | OUTPATIENT
Start: 2020-10-28 | End: 2020-10-28

## 2020-10-28 RX ORDER — SODIUM CHLORIDE 9 MG/ML
75 INJECTION, SOLUTION INTRAVENOUS CONTINUOUS
Status: DISCONTINUED | OUTPATIENT
Start: 2020-10-28 | End: 2020-10-28

## 2020-10-28 RX ORDER — GUAIFENESIN 100 MG/5ML
324 LIQUID (ML) ORAL
Status: DISCONTINUED | OUTPATIENT
Start: 2020-10-28 | End: 2020-10-28

## 2020-10-28 RX ORDER — MIDAZOLAM HYDROCHLORIDE 1 MG/ML
.5-2 INJECTION, SOLUTION INTRAMUSCULAR; INTRAVENOUS
Status: DISCONTINUED | OUTPATIENT
Start: 2020-10-28 | End: 2020-10-28

## 2020-10-28 RX ORDER — METOPROLOL SUCCINATE 100 MG/1
100 TABLET, EXTENDED RELEASE ORAL DAILY
Status: DISCONTINUED | OUTPATIENT
Start: 2020-10-29 | End: 2020-10-29 | Stop reason: HOSPADM

## 2020-10-28 RX ORDER — TRAZODONE HYDROCHLORIDE 50 MG/1
50 TABLET ORAL
Status: DISCONTINUED | OUTPATIENT
Start: 2020-10-28 | End: 2020-10-29 | Stop reason: HOSPADM

## 2020-10-28 RX ORDER — GUAIFENESIN 100 MG/5ML
81 LIQUID (ML) ORAL DAILY
Status: DISCONTINUED | OUTPATIENT
Start: 2020-10-29 | End: 2020-10-29 | Stop reason: HOSPADM

## 2020-10-28 RX ORDER — CLOPIDOGREL BISULFATE 75 MG/1
75 TABLET ORAL DAILY
Status: DISCONTINUED | OUTPATIENT
Start: 2020-10-29 | End: 2020-10-29 | Stop reason: HOSPADM

## 2020-10-28 RX ORDER — ATORVASTATIN CALCIUM 80 MG/1
80 TABLET, FILM COATED ORAL DAILY
Status: DISCONTINUED | OUTPATIENT
Start: 2020-10-29 | End: 2020-10-29 | Stop reason: HOSPADM

## 2020-10-28 RX ORDER — HYDRALAZINE HYDROCHLORIDE 20 MG/ML
10 INJECTION INTRAMUSCULAR; INTRAVENOUS
Status: DISCONTINUED | OUTPATIENT
Start: 2020-10-28 | End: 2020-10-28 | Stop reason: HOSPADM

## 2020-10-28 RX ORDER — MAG HYDROX/ALUMINUM HYD/SIMETH 200-200-20
30 SUSPENSION, ORAL (FINAL DOSE FORM) ORAL
Status: COMPLETED | OUTPATIENT
Start: 2020-10-28 | End: 2020-10-28

## 2020-10-28 RX ORDER — GUAIFENESIN 100 MG/5ML
324 LIQUID (ML) ORAL
Status: DISCONTINUED | OUTPATIENT
Start: 2020-10-28 | End: 2020-10-28 | Stop reason: SDUPTHER

## 2020-10-28 RX ORDER — LIDOCAINE HYDROCHLORIDE 10 MG/ML
1-30 INJECTION, SOLUTION EPIDURAL; INFILTRATION; INTRACAUDAL; PERINEURAL ONCE
Status: COMPLETED | OUTPATIENT
Start: 2020-10-28 | End: 2020-10-28

## 2020-10-28 RX ORDER — HEPARIN SODIUM 10000 [USP'U]/ML
1000-10000 INJECTION, SOLUTION INTRAVENOUS; SUBCUTANEOUS
Status: DISCONTINUED | OUTPATIENT
Start: 2020-10-28 | End: 2020-10-28

## 2020-10-28 RX ORDER — HEPARIN SODIUM 200 [USP'U]/100ML
3 INJECTION, SOLUTION INTRAVENOUS CONTINUOUS
Status: DISCONTINUED | OUTPATIENT
Start: 2020-10-28 | End: 2020-10-28

## 2020-10-28 RX ORDER — HYDROMORPHONE HYDROCHLORIDE 2 MG/ML
1 INJECTION, SOLUTION INTRAMUSCULAR; INTRAVENOUS; SUBCUTANEOUS
Status: DISCONTINUED | OUTPATIENT
Start: 2020-10-28 | End: 2020-10-28

## 2020-10-28 RX ORDER — SODIUM CHLORIDE 9 MG/ML
75 INJECTION, SOLUTION INTRAVENOUS CONTINUOUS
Status: DISCONTINUED | OUTPATIENT
Start: 2020-10-28 | End: 2020-10-28 | Stop reason: SDUPTHER

## 2020-10-28 RX ORDER — TRAMADOL HYDROCHLORIDE 50 MG/1
50 TABLET ORAL
Status: DISCONTINUED | OUTPATIENT
Start: 2020-10-28 | End: 2020-10-29 | Stop reason: HOSPADM

## 2020-10-28 RX ORDER — SODIUM CHLORIDE 9 MG/ML
75 INJECTION, SOLUTION INTRAVENOUS CONTINUOUS
Status: DISPENSED | OUTPATIENT
Start: 2020-10-28 | End: 2020-10-28

## 2020-10-28 RX ADMIN — IOPAMIDOL 450 ML: 755 INJECTION, SOLUTION INTRAVENOUS at 11:18

## 2020-10-28 RX ADMIN — MIDAZOLAM HYDROCHLORIDE 2 MG: 1 INJECTION, SOLUTION INTRAMUSCULAR; INTRAVENOUS at 10:07

## 2020-10-28 RX ADMIN — DIPHENHYDRAMINE HYDROCHLORIDE 25 MG: 50 INJECTION, SOLUTION INTRAMUSCULAR; INTRAVENOUS at 08:54

## 2020-10-28 RX ADMIN — HEPARIN SODIUM 2 ML: 10000 INJECTION INTRAVENOUS; SUBCUTANEOUS at 08:42

## 2020-10-28 RX ADMIN — HEPARIN SODIUM 2500 UNITS: 10000 INJECTION INTRAVENOUS; SUBCUTANEOUS at 09:42

## 2020-10-28 RX ADMIN — CLOPIDOGREL BISULFATE 300 MG: 75 TABLET ORAL at 11:17

## 2020-10-28 RX ADMIN — MIDAZOLAM HYDROCHLORIDE 1 MG: 1 INJECTION, SOLUTION INTRAMUSCULAR; INTRAVENOUS at 09:13

## 2020-10-28 RX ADMIN — MIDAZOLAM HYDROCHLORIDE 1 MG: 1 INJECTION, SOLUTION INTRAMUSCULAR; INTRAVENOUS at 09:32

## 2020-10-28 RX ADMIN — MIDAZOLAM HYDROCHLORIDE 2 MG: 1 INJECTION, SOLUTION INTRAMUSCULAR; INTRAVENOUS at 08:45

## 2020-10-28 RX ADMIN — MIDAZOLAM HYDROCHLORIDE 2 MG: 1 INJECTION, SOLUTION INTRAMUSCULAR; INTRAVENOUS at 09:23

## 2020-10-28 RX ADMIN — HYDROMORPHONE HYDROCHLORIDE 1 MG: 2 INJECTION INTRAMUSCULAR; INTRAVENOUS; SUBCUTANEOUS at 09:43

## 2020-10-28 RX ADMIN — TRAMADOL HYDROCHLORIDE 50 MG: 50 TABLET, FILM COATED ORAL at 20:08

## 2020-10-28 RX ADMIN — HYDROMORPHONE HYDROCHLORIDE 1 MG: 2 INJECTION INTRAMUSCULAR; INTRAVENOUS; SUBCUTANEOUS at 08:52

## 2020-10-28 RX ADMIN — MIDAZOLAM HYDROCHLORIDE 1 MG: 1 INJECTION, SOLUTION INTRAMUSCULAR; INTRAVENOUS at 08:53

## 2020-10-28 RX ADMIN — LIDOCAINE HYDROCHLORIDE 4 ML: 10 INJECTION, SOLUTION EPIDURAL; INFILTRATION; INTRACAUDAL; PERINEURAL at 08:40

## 2020-10-28 RX ADMIN — HEPARIN SODIUM 2500 UNITS: 10000 INJECTION INTRAVENOUS; SUBCUTANEOUS at 09:02

## 2020-10-28 RX ADMIN — HEPARIN SODIUM 5000 UNITS: 10000 INJECTION INTRAVENOUS; SUBCUTANEOUS at 08:45

## 2020-10-28 RX ADMIN — MIDAZOLAM HYDROCHLORIDE 2 MG: 1 INJECTION, SOLUTION INTRAMUSCULAR; INTRAVENOUS at 08:37

## 2020-10-28 RX ADMIN — HYDRALAZINE HYDROCHLORIDE 10 MG: 20 INJECTION INTRAMUSCULAR; INTRAVENOUS at 12:16

## 2020-10-28 RX ADMIN — HEPARIN SODIUM 2500 UNITS: 10000 INJECTION INTRAVENOUS; SUBCUTANEOUS at 10:36

## 2020-10-28 RX ADMIN — MIDAZOLAM HYDROCHLORIDE 1 MG: 1 INJECTION, SOLUTION INTRAMUSCULAR; INTRAVENOUS at 10:41

## 2020-10-28 RX ADMIN — HEPARIN SODIUM 3 UNITS/HR: 200 INJECTION, SOLUTION INTRAVENOUS at 08:17

## 2020-10-28 RX ADMIN — MIDAZOLAM HYDROCHLORIDE 1 MG: 1 INJECTION, SOLUTION INTRAMUSCULAR; INTRAVENOUS at 09:43

## 2020-10-28 RX ADMIN — MIDAZOLAM HYDROCHLORIDE 1 MG: 1 INJECTION, SOLUTION INTRAMUSCULAR; INTRAVENOUS at 10:26

## 2020-10-28 RX ADMIN — ALUMINUM HYDROXIDE, MAGNESIUM HYDROXIDE, AND SIMETHICONE 30 ML: 200; 200; 20 SUSPENSION ORAL at 11:17

## 2020-10-28 RX ADMIN — TRAZODONE HYDROCHLORIDE 50 MG: 50 TABLET ORAL at 21:46

## 2020-10-28 RX ADMIN — MIDAZOLAM HYDROCHLORIDE 2 MG: 1 INJECTION, SOLUTION INTRAMUSCULAR; INTRAVENOUS at 08:32

## 2020-10-28 RX ADMIN — NITROGLYCERIN 0.2 MG: 200 INJECTION, SOLUTION INTRAVENOUS at 08:50

## 2020-10-28 NOTE — PROGRESS NOTES
TRANSFER - OUT REPORT:    Verbal report given to Braden Zavala RN on Niru Rangel  being transferred to 3rd floor for routine progression of care       Report consisted of patients Situation, Background, Assessment and Recommendations(SBAR). Information from the following report(s) SBAR, Kardex, Procedure Summary, MAR, Recent Results and Cardiac Rhythm junctional was reviewed with the receiving nurse. Opportunity for questions and clarification was provided. Right radial site with armboard and TR band. Old oozing noted to site. No bleeding or hematoma noted to site. Site soft. Pt transported with Janneth Siegel RN.

## 2020-10-28 NOTE — PROCEDURES
[facility]  CARDIAC CATH    Name:  Desi Murguia  MR#:  [id number]  :  1950  ACCOUNT #:  [de-identified]  DATE OF SERVICE:  10/28/2020    PROCEDURES PERFORMED:  Complex two-vessel PCI. PREOPERATIVE DIAGNOSES:  Coronary artery disease with symptomatic coronary artery obstruction. POSTOPERATIVE DIAGNOSES:  Coronary artery disease with symptomatic coronary artery obstruction. SURGEON:  Emiliano Washington MD    ASSISTANT:  JEREMIAS Sheppard    ESTIMATED BLOOD LOSS:  3 mL. SPECIMENS REMOVED:  None. COMPLICATIONS:  None. IMPLANTS:  One drug-eluting stent to the LAD and one drug-eluting stent to the left circumflex. ANESTHESIA:  Conscious sedation administered by Zahra Cruz RN using Versed and Dilaudid. HISTORY:  This is a 67-year-old gentleman who suffers from ischemic heart disease. A recent cardiac catheterization revealed severe two-vessel coronary artery disease and left main disease of uncertain significance. CABG was recommended. He refused CABG. He is brought into the cath lab for intervention. PROCEDURE:  Left coronary angiography was performed with a 6-[place] 3.5 XB via the right radial artery. This reveals  severe athero-calcific obstructive disease in the proximal LAD and proximal left circumflex. The LAD was worked on first.  The patient was then anticoagulated with heparin. The LAD was wired with a Runthrough. IVUS was performed. The left main disease was determined not to be severe. The LAD was worked on next. Via a 6fr 3.5 XB then a 7 fr. sheathless 3.5 XB, rotational athrectomy with a 1.5 sunita and a 1.75 sunita was performed. Full pre stent balloon expansion was able to be subsequently obtained in the proximal LAD using combination of 2.5 high-pressure balloon and 3.0 AngioSculpt. Following adequate preparation, the proximal LAD was stented with a 33-mm long [place] stent, deployed on a 3.0-mm balloon, post dilated at high pressure from 3.0-4.0 mm. Repeat angiography revealed a good result. Asymptomatic occlusion of a small  diagonal branch is noted. Attention was then turned to the left circumflex. Rotational atherectomy was  performed with a 1.75 bur. Over a Wiggle wire,and after an IVUS exam, the vessel was  stented with a 3.5 x [address] stent postdilated at high pressure to 3.5. The proximal area of the stented segment received high pressure at 3.0 AngioSculpt balloon angioplasty up to 26 atmospheres. Repeat IVUS showed a good result. Final angiography revealed a good result. The case was stopped at this point. FINDINGS:  Prior to intervention, fluoroscopy of the left coronary reveals heavy calcification in the region of distal left main, proximal LAD, proximal left circumflex. In the proximal LAD segment, there is a severe stenosis on the order of 75%. The middle and distal portion of the LAD show irregularity with no stenosis. Post stent deployment, the LAD stent is widely patent. There is no dissection. There is NONI III flow. There is no disruption of the left main. The diagonal branch is occluded. Post angioplasty and stenting, the left circumflex is now widely patent. The stent is well deployed. There is 0% residual narrowing. There is NONI III flow into the left circumflex circulation. There is no dissection. No thrombus. There is no retrograde dissection into the left main. After the obtuse marginal branch, there is a relatively small posterolateral branch, which has a long 60% stenosis. At the proximal portion of the stented segment, there is a small marginal branch or ramus, which has a high-grade stenosis. IMPRESSION:  Severe disease of the left coronary artery is present as described above. There is a moderate distal left main stenosis that is not surgical by IVUS criteria of greater than 6 sq mm.   The LAD and left circumflex are now widely patent post deployment of 3.0/4.0 x 33-mm  Jennifer stent and 3.5x33 [address] stent, respectively.         MD DERICK [email or web address]  D:  10/28/2020 11:45  T:  10/28/2020 19:16  JOB #:  [id number]

## 2020-10-28 NOTE — PROCEDURES
Brief Cardiac Procedure Note    Patient: Abisai Mackey MRN: [id number]  SSN: [id number]    YOB: 1950  Age: 72 y.o. Sex: male      Date of Procedure: 10/28/2020     Pre-procedure Diagnosis: Coronary Artery Disease    Post-procedure Diagnosis: Coronary Artery Disease    Reason for Procedure: Worsening Angina    Procedure: pci lad and lcx    Brief Description of Procedure: via rra    Performed By: Yfn aBllesteros MD     Assistants:     Anesthesia: Moderate Sedation    Estimated Blood Loss: Less than 10 mL      Specimens: None    Implants: None    Findings:   Lm 50%, 10 mm2 by ivus  Lad 80% prox>> 0% 33 Talia 3.0 to 4.0 mm  Lcx 80% prox>> 0% 33 talia 3.5 mm  + ivus  +ptcra  + heparin             Complications: None    Recommendations: Continue medical therapy.     Signed By: Yfn Ballesterso MD     October 28, 2020

## 2020-10-28 NOTE — ROUTINE PROCESS
TRANSFER - OUT REPORT: 
 
PCI with rotoblator Dr. Hansel Andrade access Versed 16mg, dilaudid 2mg, benadryl 25mg, plavix 300mg, mylanta 30ml Heparin 14,500 units total 
Last  @ 1030 One stent in LAD, one stent in circ 
R band placed @ 1115 with 12ml air Verbal report given to Mayda Campos RN(name) on Nile Slight  being transferred to cpru(unit) for routine progression of care Report consisted of patients Situation, Background, Assessment and  
Recommendations(SBAR). Information from the following report(s) Procedure Summary was reviewed with the receiving nurse. Lines:  
Peripheral IV 10/28/20 Right Antecubital (Active) Opportunity for questions and clarification was provided. Patient transported with: 
 Monitor O2 @ 2 liters Registered Nurse

## 2020-10-28 NOTE — ROUTINE PROCESS
Bedside and verbal report given to [facility] by Bobbe Goodell. Report included the following information SBAR, Kardex, Intake/Output and MAR. Oncoming RN assumed care of the patient. R radial cath site visualized, no apparent bleeding or hematoma noted.

## 2020-10-28 NOTE — ROUTINE PROCESS
Radial compression band removed at 1500 after slowly reducing air from 12 cc to zero as per hospital protocol. No bleeding or hematoma noted. 2 x 2 gauze with tegaderm placed over puncture site. The affected extremity is warm and dry to the touch. Frequent vital signs printed and placed on bedside chart. Patient instructed to call if any bleeding noted on gauze. Patient verbalized understanding the nursing instructions.

## 2020-10-28 NOTE — ROUTINE PROCESS
TRANSFER - IN REPORT: 
 
Verbal report received from Allie Jon , RN on Brittany Urrutia being received from [address] [place] [address] for routine post - op Report consisted of patients Situation, Background, Assessment and Recommendations(SBAR). Information from the following report(s) Procedure Summary was reviewed with the receiving nurse. Opportunity for questions and clarification was provided. Assessment completed upon patients arrival to unit and care assumed. Telemetry monitor applied, bed low and locked, side rails x2. Patient has been oriented to room and instructed to use call light for assistance. Dual skin assessment completed with secondary RN which reveals sacrum and heels intact. R rad cath site present with minimal dried blood, no hematoma noted.  Pt educated to limit use of R wrist.

## 2020-10-28 NOTE — PROGRESS NOTES
TRANSFER - IN REPORT:    Verbal report received from LASHON Alicea on Peter Chacko being received from [address] [place] [address] for routine progression of care      Report consisted of patients Situation, Background, Assessment and Recommendations(SBAR). Information from the following report(s) Procedure Summary was reviewed with the receiving nurse. Opportunity for questions and clarification was provided. Assessment completed upon patients arrival to unit and care assumed.

## 2020-10-29 VITALS
WEIGHT: 192.2 LBS | RESPIRATION RATE: 18 BRPM | TEMPERATURE: 98.3 F | DIASTOLIC BLOOD PRESSURE: 55 MMHG | HEART RATE: 47 BPM | SYSTOLIC BLOOD PRESSURE: 99 MMHG | HEIGHT: 69 IN | OXYGEN SATURATION: 99 % | BODY MASS INDEX: 28.47 KG/M2

## 2020-10-29 LAB
ANION GAP SERPL CALC-SCNC: 9 MMOL/L (ref 7–16)
BUN SERPL-MCNC: 16 MG/DL (ref 8–23)
CALCIUM SERPL-MCNC: 9.7 MG/DL (ref 8.3–10.4)
CHLORIDE SERPL-SCNC: 105 MMOL/L (ref 98–107)
CO2 SERPL-SCNC: 23 MMOL/L (ref 21–32)
CREAT SERPL-MCNC: 1.15 MG/DL (ref 0.8–1.5)
GLUCOSE SERPL-MCNC: 93 MG/DL (ref 65–100)
POTASSIUM SERPL-SCNC: 4 MMOL/L (ref 3.5–5.1)
SODIUM SERPL-SCNC: 137 MMOL/L (ref 136–145)

## 2020-10-29 PROCEDURE — 36415 COLL VENOUS BLD VENIPUNCTURE: CPT

## 2020-10-29 PROCEDURE — 74011250636 HC RX REV CODE- 250/636: Performed by: NURSE PRACTITIONER

## 2020-10-29 PROCEDURE — 80048 BASIC METABOLIC PNL TOTAL CA: CPT

## 2020-10-29 PROCEDURE — 74011250637 HC RX REV CODE- 250/637: Performed by: NURSE PRACTITIONER

## 2020-10-29 PROCEDURE — 99217 PR OBSERVATION CARE DISCHARGE MANAGEMENT: CPT | Performed by: INTERNAL MEDICINE

## 2020-10-29 RX ORDER — ONDANSETRON 2 MG/ML
4 INJECTION INTRAMUSCULAR; INTRAVENOUS ONCE
Status: COMPLETED | OUTPATIENT
Start: 2020-10-29 | End: 2020-10-29

## 2020-10-29 RX ORDER — ACETAMINOPHEN 325 MG/1
650 TABLET ORAL
Status: DISCONTINUED | OUTPATIENT
Start: 2020-10-29 | End: 2020-10-29 | Stop reason: HOSPADM

## 2020-10-29 RX ADMIN — ACETAMINOPHEN 650 MG: 325 TABLET, FILM COATED ORAL at 04:03

## 2020-10-29 RX ADMIN — ONDANSETRON 4 MG: 2 INJECTION INTRAMUSCULAR; INTRAVENOUS at 04:02

## 2020-10-29 NOTE — DISCHARGE SUMMARY
[address] [place] [address] 121 Cardiology Discharge Summary     Patient ID:  Madai Martinez  [id number]  72 y.o.  1950    Admit date: 10/28/2020    Discharge date:  10/29/2020    Admitting Physician: Makenzie Beltran MD     Discharge Physician: Dr. Carlos Edwards    Admission Diagnoses: Chronic ischemic heart disease, unspecified [I25.9]  CAD S/P percutaneous coronary angioplasty [I25.10, Z98.61]    Discharge Diagnoses:    Diagnosis    CAD S/P percutaneous coronary angioplasty    Hypertension    Chronic renal insufficiency, stage III (moderate)    Mixed hyperlipidemia       Cardiology Procedures this admission:  Left heart catheterization with PCI  Consults: None    Hospital Course: Patient was seen at the office of [address] [place] [address] 121 Cardiology by Dr. Conrad Thapa for complaints of fatigue and was subsequently scheduled for an AM Admission [facility] at [facility] on 10/28/20. Patient underwent cardiac catheterization by Dr. Conrad Thapa. Patient was found to have 80% stenosis of the pLAD that was stented with a 3.0 x 33-mm [place] CRISTHIAN with 0% residual stenosis. Patient was also found to have 80% stenosis of the proximal left circumflex that was stented with a 3.5 x 33-mm [place] CRISTHIAN with 0% residual stenosis. Patient tolerated the procedure well and was taken to the telemetry floor for recovery. The morning of discharge, patient was up feeling well without any complaints of chest pain or shortness of breath. Patient's right radial cath site was clean, dry and intact without hematoma or bruit. Patient's labs were WNL. Patient was seen and examined by Dr. Carlos Edwards and determined stable and ready for discharge. Patient was instructed on the importance of medication compliance including taking aspirin and Plavix everyday without missing a dose. After receiving drug eluting stents, the patient will remain on dual anti-platelet therapy for 1 year. For maximized medical therapy for CAD, patient will continue BB, ACE-I, and statin as well.   The patient will follow up with [facility] Cardiology -- Dr. Ginny Vargas on 11/17/2020 at 4 pm in [place] office. Patient has been referred to cardiac rehab. DISPOSITION: The patient is being discharged home in stable condition on a low saturated fat, low cholesterol and low salt diet. The patient is instructed to advance activities as tolerated to the limit of fatigue or shortness of breath. The patient is instructed to avoid all heavy lifting, straining, stooping or squatting for 3-5 days. The patient is instructed to watch the cath site for bleeding/oozing; if seen, the patient is instructed to apply firm pressure with a clean cloth and call [facility] Cardiology at [phone]. The patient is instructed to watch for signs of infection which include: increasing area of redness, fever/hot to touch or purulent drainage at the catheterization site. The patient is instructed not to soak in a bathtub for 7-10 days, but is cleared to shower. The patient is instructed to call the office or return to the ER for immediate evaluation for any shortness of breath or chest pain not relieved by NTG. Discharge Exam: Patient has been seen by Dr. Mike Rueda: see his progress note for exam details.     Visit Vitals  BP (!) 116/56 (BP 1 Location: Left arm, BP Patient Position: Sitting)   Pulse (!) 47   Temp 97.5 °F (36.4 °C)   Resp 18   Ht 5' 9\" (1.753 m)   Wt 87.2 kg (192 lb 3.2 oz)   SpO2 99%   BMI 28.38 kg/m²       Recent Results (from the past 24 hour(s))   EKG, 12 LEAD, INITIAL    Collection Time: 10/28/20  7:36 AM   Result Value Ref Range    Ventricular Rate 68 BPM    Atrial Rate 68 BPM    P-R Interval 172 ms    QRS Duration 134 ms    Q-T Interval 472 ms    QTC Calculation (Bezet) 501 ms    Calculated P Axis 54 degrees    Calculated R Axis -37 degrees    Calculated T Axis 65 degrees    Diagnosis       Sinus rhythm with Premature atrial complexes in a pattern of bigeminy  Left atrial enlargement  Left axis deviation  Right bundle branch block  Left ventricular hypertrophy  Lateral infarct (cited on or before 28-OCT-2020)  Abnormal ECG  When compared with ECG of 11-OCT-2020 11:29,  Premature ventricular complexes are no longer Present  Premature atrial complexes are now Present  Vent. rate has decreased BY  37 BPM  Confirmed by ROSIBEL NUNEZ (), Faraz Spann ([id number]) on 10/28/2020 9:42:32 AM     POC ACTIVATED CLOTTING TIME    Collection Time: 10/28/20  8:58 AM   Result Value Ref Range    Activated Clotting Time (POC) 224 (H) 70 - 128 SECS   POC ACTIVATED CLOTTING TIME    Collection Time: 10/28/20  9:38 AM   Result Value Ref Range    Activated Clotting Time (POC) 230 (H) 70 - 128 SECS   POC ACTIVATED CLOTTING TIME    Collection Time: 10/28/20 10:32 AM   Result Value Ref Range    Activated Clotting Time (POC) 235 (H) 70 - 776 SECS   METABOLIC PANEL, BASIC    Collection Time: 10/29/20  5:09 AM   Result Value Ref Range    Sodium 137 136 - 145 mmol/L    Potassium 4.0 3.5 - 5.1 mmol/L    Chloride 105 98 - 107 mmol/L    CO2 23 21 - 32 mmol/L    Anion gap 9 7 - 16 mmol/L    Glucose 93 65 - 100 mg/dL    BUN 16 8 - 23 MG/DL    Creatinine 1.15 0.8 - 1.5 MG/DL    GFR est AA >60 >60 ml/min/1.73m2    GFR est non-AA >60 >60 ml/min/1.73m2    Calcium 9.7 8.3 - 10.4 MG/DL         Patient Instructions:     Current Discharge Medication List      CONTINUE these medications which have NOT CHANGED    Details   meloxicam (MOBIC) 7.5 mg tablet Take 7.5 mg by mouth as needed. chlorzoxazone (PARAFON FORTE) 500 mg tablet Take 500 mg by mouth three (3) times daily as needed. clopidogreL (Plavix) 75 mg tab Take 4 tabs once today then 1 tab q day thereafter. Qty: 90 Tab, Refills: 3      traZODone (DESYREL) 100 mg tablet Take 1 tab q hs  Qty: 5 Tab, Refills: 0      atorvastatin (LIPITOR) 40 mg tablet Take  by mouth daily. lisinopriL (PRINIVIL, ZESTRIL) 30 mg tablet Take 30 mg by mouth daily. docosahexaenoic acid/epa (FISH OIL PO) Take  by mouth.       aspirin 81 mg chewable tablet Take 1 Tab by mouth daily. Qty: 90 Tab, Refills: 0      metoprolol succinate (TOPROL-XL) 100 mg tablet Take 1 Tab by mouth daily. Qty: 90 Tab, Refills: 0      nitroglycerin (NITROSTAT) 0.4 mg SL tablet 1 Tab by SubLINGual route every five (5) minutes as needed for Chest Pain. Up to 3 doses.   Qty: 1 Bottle, Refills: 1

## 2020-10-29 NOTE — ROUTINE PROCESS
Bedside and Verbal shift change report given to myself (oncoming nurse) by Shyann Gonzalez RN (offgoing nurse). Report included the following information SBAR, Kardex, MAR and Recent Results.

## 2020-10-29 NOTE — ROUTINE PROCESS
Bedside and Verbal shift change report given to Vashti Felix RN (oncoming nurse) by myself (offgoing nurse). Report included the following information SBAR, Kardex, MAR and Recent Results.

## 2020-10-29 NOTE — DISCHARGE INSTRUCTIONS
Patient Education        Coronary Angioplasty: What to Expect at Home  Your Recovery     Coronary angioplasty is a procedure that is used to open a narrowed or blocked coronary artery. It may also be called a percutaneous coronary intervention (PCI). The doctor opened your narrowed or blocked artery by putting a thin tube, called a catheter, into your heart through a blood vessel. The catheter was inserted into the blood vessel in your groin or arm. Your groin or arm may have a bruise and feel sore for a day or two after the procedure. You can do light activities around the house. But don't do anything strenuous for several days. This care sheet gives you a general idea about how long it will take for you to recover. But each person recovers at a different pace. Follow the steps below to get better as quickly as possible. How can you care for yourself at home? Activity    · If the doctor gave you a sedative:  ? For 24 hours, don't do anything that requires attention to detail, such as going to work, making important decisions, or signing any legal documents. It takes time for the medicine's effects to completely wear off.  ? For your safety, do not drive or operate any machinery that could be dangerous. Wait until the medicine wears off and you can think clearly and react easily.     · Do not do strenuous exercise and do not lift, pull, or push anything heavy until your doctor says it is okay. This may be for a day or two. You can walk around the house and do light activity, such as cooking.     · If the catheter was placed in your groin, try not to walk up stairs for the first couple of days.     · If the catheter was placed in your arm near your wrist, do not bend your wrist deeply for the first couple of days. Be careful using your hand to get into and out of a chair or bed.     · Carry your stent identification card with you at all times.     · If your doctor recommends it, get more exercise.  Walking is a good choice. Bit by bit, increase the amount you walk every day. Try for at least 30 minutes on most days of the week. Diet    · Drink plenty of fluids to help your body flush out the dye. If you have kidney, heart, or liver disease and have to limit fluids, talk with your doctor before you increase the amount of fluids you drink.     · Keep eating a heart-healthy diet that has lots of fruits, vegetables, and whole grains. If you have not been eating this way, talk to your doctor. You also may want to talk to a dietitian. This expert can help you to learn about healthy foods and plan meals. Medicines    · Your doctor will tell you if and when you can restart your medicines. He or she will also give you instructions about taking any new medicines.     · If you take aspirin or some other blood thinner, ask your doctor if and when to start taking it again. Make sure that you understand exactly what your doctor wants you to do.     · Your doctor will prescribe blood-thinning medicines. You will likely take aspirin plus another antiplatelet, such as clopidogrel (Plavix). It is very important that you take these medicines exactly as directed. These medicines help keep the coronary artery open and reduce your risk of a heart attack.     · Call your doctor if you think you are having a problem with your medicine. Care of the catheter site    · For 1 or 2 days, keep a bandage over the spot where the catheter was inserted. The bandage probably will fall off in this time.     · Put ice or a cold pack on the area for 10 to 20 minutes at a time to help with soreness or swelling. Put a thin cloth between the ice and your skin.     · You may shower 24 to 48 hours after the procedure, if your doctor okays it. Pat the incision dry.     · Do not soak the catheter site until it is healed. Don't take a bath for 1 week, or until your doctor tells you it is okay.     · Watch for bleeding from the site.  A small amount of blood (up to the size of a quarter) on the bandage can be normal.     · If you are bleeding, lie down and press on the area for 15 minutes to try to make it stop. If the bleeding does not stop, call your doctor or seek immediate medical care. Follow-up care is a key part of your treatment and safety. Be sure to make and go to all appointments, and call your doctor if you are having problems. It's also a good idea to know your test results and keep a list of the medicines you take. When should you call for help? Call 911 anytime you think you may need emergency care. For example, call if:    · You passed out (lost consciousness).     · You have severe trouble breathing.     · You have sudden chest pain and shortness of breath, or you cough up blood.     · You have symptoms of a heart attack, such as:  ? Chest pain or pressure. ? Sweating. ? Shortness of breath. ? Nausea or vomiting. ? Pain that spreads from the chest to the neck, jaw, or one or both shoulders or arms. ? Dizziness or lightheadedness. ? A fast or uneven pulse. After calling 911, chew 1 adult-strength aspirin. Wait for an ambulance. Do not try to drive yourself.     · You have been diagnosed with angina, and you have angina symptoms that do not go away with rest or are not getting better within 5 minutes after you take one dose of nitroglycerin. Call your doctor now or seek immediate medical care if:    · You are bleeding from the area where the catheter was put in your artery.     · You have a fast-growing, painful lump at the catheter site.     · You have signs of infection, such as:  ? Increased pain, swelling, warmth, or redness. ? Red streaks leading from the catheter site. ? Pus draining from the catheter site. ? A fever.     · Your leg, arm, or hand is painful, looks blue, or feels cold, numb, or tingly. Watch closely for changes in your health, and be sure to contact your doctor if you have any problems. Where can you learn more?   Go to http://www.gray.com/  Enter V065 in the search box to learn more about \"Coronary Angioplasty: What to Expect at Home. \"  Current as of: December 16, 2019               Content Version: 12.6  © 5626-4329 [employer]. Care instructions adapted under license by [employer] (which disclaims liability or warranty for this information). If you have questions about a medical condition or this instruction, always ask your healthcare professional. [address] any warranty or liability for your use of this information. Cardiac Catheterization/Angiography Discharge Instructions    *Check the puncture site frequently for swelling or bleeding. If you see any bleeding, lie down and apply pressure over the area with a clean town or washcloth. Notify your doctor for any redness, swelling, drainage or oozing from the puncture site. Notify your doctor for any fever or chills. *If the leg or arm with the puncture becomes cold, numb or painful, call [facility] cardiology at [phone]    *Activity should be limited for the next 48 hours. Climb stairs as little as possible and avoid any stooping, bending or strenuous activity for 48 hours. No heavy lifting (anything over 10 pounds) for three days. *Do not drive for 48 hours. *You may resume your usual diet. Drink more fluids than usual.    *Have a responsible person drive you home and stay with you for at least 24 hours after your heart catheterization/angiography. *You may remove the bandage from your Right Arm in 24 hours. You may shower in 24 hours. No tub baths, hot tubs or swimming for one week. Do not place any lotions, creams, powders, ointments over the puncture site for one week. You may place a clean band-aid over the puncture site each day for 5 days. Change this daily.

## 2020-10-29 NOTE — PROGRESS NOTES
Pt asked to be discharged due to PTSD and anxiety from his last visit. Spoke with ANAYA Briscoe about patient wanting to leave tonight. Update patient about the need to stay overnight to be monitored and he stated \"put in my chart that this will be my last visit here because I don't understand this and why I need to stay, it doesn't make sense\". Wife at bedside.

## 2020-10-29 NOTE — PROGRESS NOTES
Patient stable. Wanted to leave AMA last night so requested to go home as quickly as possible Discharge instructions reviewed with Patient. Prescriptions given for (no new meds). Opportunity for questions provided. Patient voiced understanding of all discharge instructions.

## 2021-08-04 NOTE — PROGRESS NOTES
Patient pre-assessment complete for [facility] scheduled for [facility], arrival time 2021. Patient verified using . Patient instructed to bring all home medications in labeled bottles on the day of procedure. NPO status reinforced. Patient informed to take a full dose aspirin 325mg  or 81 mg x 4 on the day of procedure. Instructed they can take all other medications excluding vitamins & supplements. Patient verbalizes understanding of all instructions & denies any questions at this time.

## 2021-08-05 ENCOUNTER — HOSPITAL ENCOUNTER (OUTPATIENT)
Age: 71
Setting detail: OUTPATIENT SURGERY
Discharge: HOME | End: 2021-08-05
Attending: INTERNAL MEDICINE | Admitting: INTERNAL MEDICINE
Payer: MEDICARE

## 2021-08-05 VITALS
BODY MASS INDEX: 27.4 KG/M2 | OXYGEN SATURATION: 98 % | DIASTOLIC BLOOD PRESSURE: 88 MMHG | HEIGHT: 69 IN | WEIGHT: 185 LBS | HEART RATE: 68 BPM | SYSTOLIC BLOOD PRESSURE: 135 MMHG | RESPIRATION RATE: 16 BRPM

## 2021-08-05 DIAGNOSIS — R07.2 PRECORDIAL PAIN: ICD-10-CM

## 2021-08-05 DIAGNOSIS — R07.2 PRECORDIAL CHEST PAIN: ICD-10-CM

## 2021-08-05 LAB
ACT BLD: 307 SECS (ref 70–128)
ANION GAP SERPL CALC-SCNC: 8 MMOL/L (ref 7–16)
ATRIAL RATE: 41 BPM
ATRIAL RATE: 41 BPM
ATRIAL RATE: 64 BPM
BUN SERPL-MCNC: 25 MG/DL (ref 8–23)
CALCIUM SERPL-MCNC: 9.8 MG/DL (ref 8.3–10.4)
CALCULATED P AXIS, ECG09: 13 DEGREES
CALCULATED P AXIS, ECG09: 44 DEGREES
CALCULATED P AXIS, ECG09: 54 DEGREES
CALCULATED R AXIS, ECG10: -57 DEGREES
CALCULATED R AXIS, ECG10: -57 DEGREES
CALCULATED R AXIS, ECG10: -60 DEGREES
CALCULATED T AXIS, ECG11: -128 DEGREES
CALCULATED T AXIS, ECG11: 1 DEGREES
CALCULATED T AXIS, ECG11: 59 DEGREES
CHLORIDE SERPL-SCNC: 109 MMOL/L (ref 98–107)
CO2 SERPL-SCNC: 25 MMOL/L (ref 21–32)
CREAT SERPL-MCNC: 1.07 MG/DL (ref 0.8–1.5)
DIAGNOSIS, 93000: NORMAL
ERYTHROCYTE [DISTWIDTH] IN BLOOD BY AUTOMATED COUNT: 14.1 % (ref 11.9–14.6)
GLUCOSE SERPL-MCNC: 98 MG/DL (ref 65–100)
HCT VFR BLD AUTO: 43.7 % (ref 41.1–50.3)
HGB BLD-MCNC: 13.9 G/DL (ref 13.6–17.2)
MAGNESIUM SERPL-MCNC: 1.7 MG/DL (ref 1.8–2.4)
MCH RBC QN AUTO: 27.4 PG (ref 26.1–32.9)
MCHC RBC AUTO-ENTMCNC: 31.8 G/DL (ref 31.4–35)
MCV RBC AUTO: 86.2 FL (ref 79.6–97.8)
NRBC # BLD: 0 K/UL (ref 0–0.2)
P-R INTERVAL, ECG05: 150 MS
P-R INTERVAL, ECG05: 152 MS
P-R INTERVAL, ECG05: 186 MS
PLATELET # BLD AUTO: 202 K/UL (ref 150–450)
PMV BLD AUTO: 11.6 FL (ref 9.4–12.3)
POTASSIUM SERPL-SCNC: 4.4 MMOL/L (ref 3.5–5.1)
Q-T INTERVAL, ECG07: 476 MS
Q-T INTERVAL, ECG07: 502 MS
Q-T INTERVAL, ECG07: 526 MS
QRS DURATION, ECG06: 142 MS
QRS DURATION, ECG06: 144 MS
QRS DURATION, ECG06: 148 MS
QTC CALCULATION (BEZET), ECG08: 414 MS
QTC CALCULATION (BEZET), ECG08: 433 MS
QTC CALCULATION (BEZET), ECG08: 491 MS
RBC # BLD AUTO: 5.07 M/UL (ref 4.23–5.6)
SODIUM SERPL-SCNC: 142 MMOL/L (ref 136–145)
VENTRICULAR RATE, ECG03: 41 BPM
VENTRICULAR RATE, ECG03: 41 BPM
VENTRICULAR RATE, ECG03: 64 BPM
WBC # BLD AUTO: 7.8 K/UL (ref 4.3–11.1)

## 2021-08-05 PROCEDURE — 85027 COMPLETE CBC AUTOMATED: CPT

## 2021-08-05 PROCEDURE — 92920 PRQ TRLUML C ANGIOP 1ART&/BR: CPT | Performed by: INTERNAL MEDICINE

## 2021-08-05 PROCEDURE — 74011000250 HC RX REV CODE- 250: Performed by: INTERNAL MEDICINE

## 2021-08-05 PROCEDURE — 99152 MOD SED SAME PHYS/QHP 5/>YRS: CPT | Performed by: INTERNAL MEDICINE

## 2021-08-05 PROCEDURE — 77030004558 HC CATH ANGI DX SUPR TORQ CARD -A: Performed by: INTERNAL MEDICINE

## 2021-08-05 PROCEDURE — 93005 ELECTROCARDIOGRAM TRACING: CPT | Performed by: INTERNAL MEDICINE

## 2021-08-05 PROCEDURE — 74011000636 HC RX REV CODE- 636: Performed by: INTERNAL MEDICINE

## 2021-08-05 PROCEDURE — C1769 GUIDE WIRE: HCPCS | Performed by: INTERNAL MEDICINE

## 2021-08-05 PROCEDURE — 74011250636 HC RX REV CODE- 250/636: Performed by: INTERNAL MEDICINE

## 2021-08-05 PROCEDURE — 92924 PRQ TRLUML C ATHRC 1 ART&/BR: CPT | Performed by: INTERNAL MEDICINE

## 2021-08-05 PROCEDURE — 2709999900 HC NON-CHARGEABLE SUPPLY: Performed by: INTERNAL MEDICINE

## 2021-08-05 PROCEDURE — 80048 BASIC METABOLIC PNL TOTAL CA: CPT

## 2021-08-05 PROCEDURE — C1887 CATHETER, GUIDING: HCPCS | Performed by: INTERNAL MEDICINE

## 2021-08-05 PROCEDURE — 77030042317 HC BND COMPR HEMSTAT -B: Performed by: INTERNAL MEDICINE

## 2021-08-05 PROCEDURE — 99153 MOD SED SAME PHYS/QHP EA: CPT | Performed by: INTERNAL MEDICINE

## 2021-08-05 PROCEDURE — 76210000031 HC REC RM PH II 4.5 TO 5 HR: Performed by: INTERNAL MEDICINE

## 2021-08-05 PROCEDURE — 92928 PRQ TCAT PLMT NTRAC ST 1 LES: CPT | Performed by: INTERNAL MEDICINE

## 2021-08-05 PROCEDURE — C1725 CATH, TRANSLUMIN NON-LASER: HCPCS | Performed by: INTERNAL MEDICINE

## 2021-08-05 PROCEDURE — 93458 L HRT ARTERY/VENTRICLE ANGIO: CPT | Performed by: INTERNAL MEDICINE

## 2021-08-05 PROCEDURE — 83735 ASSAY OF MAGNESIUM: CPT

## 2021-08-05 PROCEDURE — 85347 COAGULATION TIME ACTIVATED: CPT

## 2021-08-05 PROCEDURE — 77030012468 HC VLV BLEEDBK CNTRL ABBT -B: Performed by: INTERNAL MEDICINE

## 2021-08-05 PROCEDURE — 74011250637 HC RX REV CODE- 250/637: Performed by: INTERNAL MEDICINE

## 2021-08-05 PROCEDURE — C1894 INTRO/SHEATH, NON-LASER: HCPCS | Performed by: INTERNAL MEDICINE

## 2021-08-05 RX ORDER — GUAIFENESIN 100 MG/5ML
325 LIQUID (ML) ORAL ONCE
Status: DISCONTINUED | OUTPATIENT
Start: 2021-08-05 | End: 2021-08-05 | Stop reason: HOSPADM

## 2021-08-05 RX ORDER — LIDOCAINE HYDROCHLORIDE 10 MG/ML
INJECTION INFILTRATION; PERINEURAL AS NEEDED
Status: DISCONTINUED | OUTPATIENT
Start: 2021-08-05 | End: 2021-08-05 | Stop reason: HOSPADM

## 2021-08-05 RX ORDER — MIDAZOLAM HYDROCHLORIDE 1 MG/ML
INJECTION, SOLUTION INTRAMUSCULAR; INTRAVENOUS AS NEEDED
Status: DISCONTINUED | OUTPATIENT
Start: 2021-08-05 | End: 2021-08-05 | Stop reason: HOSPADM

## 2021-08-05 RX ORDER — SODIUM CHLORIDE 9 MG/ML
75 INJECTION, SOLUTION INTRAVENOUS CONTINUOUS
Status: DISCONTINUED | OUTPATIENT
Start: 2021-08-05 | End: 2021-08-05 | Stop reason: HOSPADM

## 2021-08-05 RX ORDER — HEPARIN SODIUM 200 [USP'U]/100ML
INJECTION, SOLUTION INTRAVENOUS
Status: COMPLETED | OUTPATIENT
Start: 2021-08-05 | End: 2021-08-05

## 2021-08-05 RX ORDER — HEPARIN SODIUM 10000 [USP'U]/ML
INJECTION, SOLUTION INTRAVENOUS; SUBCUTANEOUS AS NEEDED
Status: DISCONTINUED | OUTPATIENT
Start: 2021-08-05 | End: 2021-08-05 | Stop reason: HOSPADM

## 2021-08-05 RX ORDER — CLOPIDOGREL BISULFATE 75 MG/1
TABLET ORAL AS NEEDED
Status: DISCONTINUED | OUTPATIENT
Start: 2021-08-05 | End: 2021-08-05 | Stop reason: HOSPADM

## 2021-08-05 RX ADMIN — SODIUM CHLORIDE 75 ML/HR: 900 INJECTION, SOLUTION INTRAVENOUS at 08:46

## 2021-08-05 NOTE — PROGRESS NOTES
Patient received to [address] [place] [address] room # 10  Ambulatory from [facility]. Patient scheduled for C today with Dr Eric Hernández. Procedure reviewed & questions answered, voiced good understanding consent obtained & placed on chart. All medications and medical history reviewed. Will prep patient per orders. Patient & family updated on plan of care. The patient has a fraility score of 3-MANAGING WELL, based on ability to ambulate independently. Patient took Aspirin 324mg today at 0600 prior to arrival.  Plavix 75mg also taken PTA.

## 2021-08-05 NOTE — PROGRESS NOTES
TRANSFER - IN REPORT:    Verbal report received from Vicki Martinez RN on [employer] being received from [address] [place] [address] for routine progression of care      Report consisted of patients Situation, Background, Assessment and Recommendations(SBAR). Information from the following report(s) Procedure Summary was reviewed with the receiving nurse. Opportunity for questions and clarification was provided. Assessment completed upon patients arrival to unit and care assumed.

## 2021-08-05 NOTE — Clinical Note
TRANSFER - OUT REPORT:     Verbal report given to: [facility] RN. Report consisted of patient's Situation, Background, Assessment and   Recommendations(SBAR). Opportunity for questions and clarification was provided. Patient transported with a Cardiac Cath Tech / Patient Care Tech. Patient transported to: [facility].

## 2021-08-05 NOTE — PROGRESS NOTES
Discharge Same Day as PCI Teaching    Discharge teaching completed. Patient instructed on right radial site care, including symptoms to report to MD, medication changes & Follow up Care to include:    1- Patient is being treated with 2 separate anti-platelet medications including aspirin 81mg & plavix 75mg. It is very important that these medications are filled today started tomorrow morning. Patient instructed on the importance of these medications & that these medications must not be stopped for any reason or without talking to the doctor first.  2-  Patient is also being discharged on a medication for cholesterol management (ie-statin) atorvastatin 40mg and instructed on the importance of continuing this medication as well. 3- Patient received a referral for Cardiac Rehab II, contact information was faxed to Cardiac rehab & patient given telephone number to contact ([phone]) Cardiac Rehab if they have not heard from them within 10 days.

## 2021-08-05 NOTE — PROGRESS NOTES
Radial compression band removed at 1500 after slowly reducing air from 12 cc to zero as per hospital protocol. No bleeding or hematoma noted. 2 x 2 gauze with tegaderm placed over puncture site. The affected extremity is warm and dry to the touch. Frequent vital signs documented per flowheet. Patient instructed to call if any bleeding noted on gauze. Patient verbalized understanding the nursing instructions.

## 2021-08-05 NOTE — Clinical Note
Lesion located in the Mid Cx. Balloon inflated using multiple inflation technique. Lesion #1: Pressure = 16 jocelin; Duration = 17 sec. Inflation 2: Pressure = 26 jocelin; Duration = 28 sec.

## 2021-08-05 NOTE — DISCHARGE INSTRUCTIONS
POST PCI/STENT DISCHARGE INSTRUCTIONS    1. Check puncture site frequently for swelling or bleeding. If there is any bleeding, lie down and apply pressure over the area with a clean towel or washcloth and call 911. Notify your doctor for any redness, swelling, drainage, or oozing from the puncture site. Notify your doctor for any fever or chills. 2. If the extremity becomes cold, numb, or painful call [facility]iology at [phone].    3. Activity should be limited for the next 48-72 hours. No heavy lifting (anything over 10 pounds) for 3 days. No pushing or pulling with right arm for the next three days. 4.  You may resume your usual diet. Drink more fluids than usual.    5.  Have a responsible person drive you home and stay with you for at least 24 hours after your heart catheterization/angiography. No driving for 24 hours. 6. You may remove bandage from your right arm in 24 hours. You may shower in 24 hours. No tub baths, hot tubs, or swimming for 1 week. Do not place any lotions, creams, powders, or ointments over puncture site for 1 week. You may place a clean band-aid over the puncture site each day for 5 days. Change daily. YOU ARE BEING DISCHARGED ON TWO ANTI-PLATELET MEDICATIONS    1- aspirin 81mg    2- plavix 75mg    THESE MEDICATIONS  ARE VERY IMPORTANT TO YOUR RECOVERY AND  MUST BE TAKEN EXACTLY AS PRESCRIBED & NOT STOPPED FOR ANY REASON. THESE MAY ONLY BE STOPPED WITH AN ORDER FROM YOUR CARDIOLOGIST. PLEASE HAVE THESE FILLED IMMEDIATELY AND START TAKING tomorrow morning. YOU ARE ALSO BEING DISCHARGED ON A MEDICATION FOR CHOLESTEROL MANAGEMENT. 1- atorvastatin 40mg      You have also been referred to [facility]. Someone from Cardiac Rehab will be calling you to set up a follow up appointment. If they have not called you within a week,  please call [phone].       Sedation for a Medical Procedure: Care Instructions     You were given a sedative medication during your visit. While many of the effects will have worn   off before you leave; you may continue to feel some effects for several hours. Common side effects from sedation include:  · Feeling sleepy. (Your doctors and nurses will make sure you are not too sleepy to go home.)  · Nausea and vomiting. This usually does not last long. · Feeling tired. How can you care for yourself at home? Activity    · Don't do anything for 24 hours that requires attention to detail. It takes time for the medicine effects to completely wear off. · Do not make important legal decisions for 24 hours. · Do not sign any legal documents for 24 hours. · Do not drink alcohol today     · For your safety, you should not drive or operate heavy machinery for the remainder of the day     · Rest when you feel tired. Getting enough sleep will help you recover. Diet    · You can eat your normal diet, unless your doctor gives you other instructions. If your stomach is upset, try clear liquids and bland, low-fat foods like plain toast or rice. · Drink plenty of fluids (unless your doctor tells you not to). · Don't drink alcohol for 24 hours. Medicines    · Be safe with medicines. Read and follow all instructions on the label. · If the doctor gave you a prescription medicine for pain, take it as prescribed. · If you are not taking a prescription pain medicine, ask your doctor if you can take an over-the-counter medicine. · If you think your pain medicine is making you sick to your stomach:  · Take your medicine after meals (unless your doctor has told you not to). · Ask your doctor for a different pain medicine. Percutaneous Coronary Intervention: What to Expect at [facility]     Percutaneous coronary intervention (PCI) is the name for procedures that are used to open a narrowed or blocked coronary artery.  The two most common PCI procedures are coronary angioplasty and coronary stent placement. Your groin or arm may have a bruise and feel sore for a day or two after a percutaneous coronary intervention (PCI). You can do light activities around the house, but nothing strenuous for several days. This care sheet gives you a general idea about how long it will take for you to recover. But each person recovers at a different pace. Follow the steps below to get better as quickly as possible. How can you care for yourself at home? Activity  · Do not do strenuous exercise and do not lift, pull, or push anything heavy until your doctor says it is okay. This may be for a day or two. You can walk around the house and do light activity, such as cooking. · You may shower 24 to 48 hours after the procedure, if your doctor okays it. Pat the incision dry. Do not take a bath for 1 week, or until your doctor tells you it is okay. · If the catheter was placed in your groin, try not to walk up stairs for the first couple of days. · If the catheter was placed in your arm near your wrist, do not bend your wrist deeply for the first couple of days. Be careful using your hand to get into and out of a chair or bed. · If your doctor recommends it, get more exercise. Walking is a good choice. Bit by bit, increase the amount you walk every day. Try for at least 30 minutes on most days of the week. Diet  · Drink plenty of fluids to help your body flush out the dye. If you have kidney, heart, or liver disease and have to limit fluids, talk with your doctor before you increase the amount of fluids you drink. · Keep eating a heart-healthy diet that has lots of fruits, vegetables, and whole grains. If you have not been eating this way, talk to your doctor. You also may want to talk to a dietitian. This expert can help you to learn about healthy foods and plan meals. Medicines  · Your doctor will tell you if and when you can restart your medicines.  He or she will also give you instructions about taking any new medicines. · If you take blood thinners, such as warfarin (Coumadin), clopidogrel (Plavix), or aspirin, be sure to talk to your doctor. He or she will tell you if and when to start taking those medicines again. Make sure that you understand exactly what your doctor wants you to do. · Your doctor will prescribe blood-thinning medicines. You will likely take aspirin plus another antiplatelet, such as clopidogrel (Plavix). It is very important that you take these medicines exactly as directed. These medicines help keep the coronary artery open and reduce your risk of a heart attack. · Call your doctor if you think you are having a problem with your medicine. Care of the catheter site  · For 1 or 2 days, keep a bandage over the spot where the catheter was inserted. The bandage probably will fall off in this time. · Put ice or a cold pack on the area for 10 to 20 minutes at a time to help with soreness or swelling. Put a thin cloth between the ice and your skin. Follow-up care is a key part of your treatment and safety. Be sure to make and go to all appointments, and call your doctor if you are having problems. It's also a good idea to know your test results and keep a list of the medicines you take. When should you call for help? Call 911 anytime you think you may need emergency care. For example, call if:  · You passed out (lost consciousness). · You have severe trouble breathing. · You have sudden chest pain and shortness of breath, or you cough up blood. · You have symptoms of a heart attack, such as:  ¨ Chest pain or pressure. ¨ Sweating. ¨ Shortness of breath. ¨ Nausea or vomiting. ¨ Pain that spreads from the chest to the neck, jaw, or one or both shoulders or arms. ¨ Dizziness or lightheadedness. ¨ A fast or uneven pulse. After calling 911, chew 1 adult-strength aspirin. Wait for an ambulance. Do not try to drive yourself.   · You have been diagnosed with angina, and you have angina symptoms that do not go away with rest or are not getting better within 5 minutes after you take one dose of nitroglycerin. Call your doctor now or seek immediate medical care if:  · You are bleeding from the area where the catheter was put in your artery. · You have a fast-growing, painful lump at the catheter site. · You have signs of infection, such as:  ¨ Increased pain, swelling, warmth, or redness. ¨ Red streaks leading from the catheter site. ¨ Pus draining from the catheter site. ¨ A fever. · Your leg or arm looks blue or feels cold, numb, or tingly. Watch closely for changes in your health, and be sure to contact your doctor if you have any problems. Where can you learn more? Go to Prescreen.be  Enter O037 in the search box to learn more about \"Percutaneous Coronary Intervention: What to Expect at Home. \"   © 5643-6096 [employer]. Care instructions adapted under license by [employer] (which disclaims liability or warranty for this information). This care instruction is for use with your licensed healthcare professional. If you have questions about a medical condition or this instruction, always ask your healthcare professional. [place] [address] any warranty or liability for your use of this information. Content Version: 38.0.650094; Current as of: May 22, 2015         Cardiac Rehabilitation: Care Instructions  Your Care Instructions    Cardiac rehabilitation is a program for people who have a heart problem, such as a heart attack, heart failure, or a heart valve disease. The program includes exercise, lifestyle changes, education, and emotional support. Cardiac rehab can help you improve the quality of your life through better overall health. It can help you lose weight and feel better about yourself.   On your cardiac rehab team, you may have your doctor, a nurse specialist, a dietitian, and a physical therapist. They will design your cardiac rehab program specifically for you. You will learn how to reduce your risk for heart problems, how to manage stress, and how to eat a heart-healthy diet. By the end of the program, you will be ready to maintain a healthier lifestyle on your own. Follow-up care is a key part of your treatment and safety. Be sure to make and go to all appointments, and call your doctor if you are having problems. It's also a good idea to know your test results and keep a list of the medicines you take. How can you care for yourself at home? · Take your medicines exactly as prescribed. Call your doctor if you think you are having a problem with your medicine. You will get more details on the specific medicines your doctor prescribes. · Weigh yourself every day if your doctor tells you to. Watch for sudden weight gain. Weigh yourself on the same scale with the same amount of clothing at the same time of day. · Plan your meals so that you are eating heart-healthy foods. ¨ Eat a variety of foods daily. Fresh fruits and vegetables and whole-grains are good choices. ¨ Limit your fat intake, especially saturated and trans fat. ¨ Limit salt (sodium). ¨ Increase fiber in your diet. ¨ Limit alcohol. · Learn how to take your pulse so that you can track your heart rate during exercise. · Always check with your doctor before you begin a new exercise program.  · Warm up before you exercise and cool down afterward for at least 15 minutes each. This will help your heart gradually prepare for and recover from exercise and avoid pushing your heart too hard. · Stop exercising if you have any unusual discomfort, such as chest pain. · Do not smoke. Smoking can make heart problems worse. If you need help quitting, talk to your doctor about stop-smoking programs and medicines. These can increase your chances of quitting for good. When should you call for help? Call 911 anytime you think you may need emergency care.  For example, call if:  · You have severe trouble breathing. · You cough up pink, foamy mucus and you have trouble breathing. · You have symptoms of a heart attack. These may include:  ¨ Chest pain or pressure, or a strange feeling in the chest.  ¨ Sweating. ¨ Shortness of breath. ¨ Nausea or vomiting. ¨ Pain, pressure, or a strange feeling in the back, neck, jaw, or upper belly or in one or both shoulders or arms. ¨ Lightheadedness or sudden weakness. ¨ A fast or irregular heartbeat. After you call 911, the  may tell you to chew 1 adult-strength or 2 to 4 low-dose aspirin. Wait for an ambulance. Do not try to drive yourself. · You have angina symptoms (such as chest pain or pressure) that do not go away with rest or are not getting better within 5 minutes after you take a dose of nitroglycerin. · You have symptoms of a stroke. These may include:  ¨ Sudden numbness, tingling, weakness, or loss of movement in your face, arm, or leg, especially on only one side of your body. ¨ Sudden vision changes. ¨ Sudden trouble speaking. ¨ Sudden confusion or trouble understanding simple statements. ¨ Sudden problems with walking or balance. ¨ A sudden, severe headache that is different from past headaches. · You passed out (lost consciousness). Call your doctor now or seek immediate medical care if:  · You have new or increased shortness of breath. · You are dizzy or lightheaded, or you feel like you may faint. · You gain weight suddenly, such as 3 pounds or more in 2 to 3 days. (Your doctor may suggest a different range of weight gain.)  · You have increased swelling in your legs, ankles, or feet. Watch closely for changes in your health, and be sure to contact your doctor if you have any problems. Where can you learn more? Go to http://www.gray.com/. Enter J658 in the search box to learn more about \"Cardiac Rehabilitation: Care Instructions. \"  Current as of:  May 5, 2016  Content Version: 11.1  © 0767-6810 [employer] Care instructions adapted under license by [employer] (which disclaims liability or warranty for this information). If you have questions about a medical condition or this instruction, always ask your healthcare professional. [address] any warranty or liability for your use of this information. MyChart Activation    Thank you for requesting access to [facility]. Please follow the instructions below to securely access and download your online medical record. [facility] allows you to send messages to your doctor, view your test results, renew your prescriptions, schedule appointments, and more. How Do I Sign Up? 1. In your internet browser, go to [email or web address]. 2. Click on the First Time User? Click Here link in the Sign In box. You will see the New Member Sign Up page. 3. Enter your [facility] Access Code exactly as it appears below. You will not need to use this code after youve completed the sign-up process. If you do not sign up before the expiration date, you must request a new code. [facility] Access Code: Activation code not generated  Current [facility] Status: Active (This is the date your [facility] access code will )    4. Enter the last four digits of your Social Security Number (xxxx) and Date of Birth (mm/dd/yyyy) as indicated and click Submit. You will be taken to the next sign-up page. 5. Create a [facility] ID. This will be your [facility] login ID and cannot be changed, so think of one that is secure and easy to remember. 6. Create a [facility] password. You can change your password at any time. 7. Enter your Password Reset Question and Answer. This can be used at a later time if you forget your password. 8. Enter your e-mail address. You will receive e-mail notification when new information is available in [address] 9. Click Sign Up.  You can now view and download portions of your medical record. 10. Click the Download Summary menu link to download a portable copy of your medical information. Additional Information    If you have questions, please visit the Frequently Asked Questions section of the [employer] website at [email or web address] Remember, [employer] is NOT to be used for urgent needs. For medical emergencies, dial 911. Coronary Angioplasty: What to Expect at [facility]     Coronary angioplasty is a procedure that is used to open a narrowed or blocked coronary artery. It may also be called a percutaneous coronary intervention (PCI). The doctor opened your narrowed or blocked artery by putting a thin tube, called a catheter, into your heart through a blood vessel. The catheter was inserted into the blood vessel in your groin or arm. The doctor may have placed a small tube, called a stent, in the artery. Your groin or arm may have a bruise and feel sore for a day or two after the procedure. You can do light activities around the house. But don't do anything strenuous for a day or two. This care sheet gives you a general idea about how long it will take for you to recover. But each person recovers at a different pace. Follow the steps below to get better as quickly as possible. How can you care for yourself at home? Activity    · If the doctor gave you a sedative:  ? For 24 hours, don't do anything that requires attention to detail, such as going to work, making important decisions, or signing any legal documents. It takes time for the medicine's effects to completely wear off.  ? For your safety, do not drive or operate any machinery that could be dangerous. Wait until the medicine wears off and you can think clearly and react easily.     · Do not do strenuous exercise and do not lift, pull, or push anything heavy until your doctor says it is okay. This may be for a day or two.  You can walk around the house and do light activity, such as cooking.     · If the catheter was placed in your groin, try not to walk up stairs for the first couple of days.     · If the catheter was placed in your arm near your wrist, do not bend your wrist deeply for the first couple of days. Be careful using your hand to get into and out of a chair or bed.     · Carry your stent identification card with you at all times.     · If your doctor recommends it, get more exercise. Walking is a good choice. Bit by bit, increase the amount you walk every day. Try for at least 30 minutes on most days of the week.     · If you haven't been set up with a cardiac rehab program, talk to your doctor about whether rehab is right for you. Cardiac rehab includes supervised exercise. It also includes help with diet and lifestyle changes and emotional support. Diet    · Drink plenty of fluids to help your body flush out the dye. If you have kidney, heart, or liver disease and have to limit fluids, talk with your doctor before you increase the amount of fluids you drink.     · Keep eating a heart-healthy diet that has lots of fruits, vegetables, and whole grains. If you have not been eating this way, talk to your doctor. You also may want to talk to a dietitian. This expert can help you to learn about healthy foods and plan meals. Medicines    · Your doctor will tell you if and when you can restart your medicines. Your doctor will also give you instructions about taking any new medicines.     · If you take aspirin or some other blood thinner, ask your doctor if and when to start taking it again. Make sure that you understand exactly what your doctor wants you to do.     · Your doctor will prescribe blood-thinning medicines. You will likely take aspirin plus another antiplatelet, such as clopidogrel (Plavix). It is very important that you take these medicines exactly as directed.  These medicines help keep the coronary artery open and reduce your risk of a heart attack.     · Call your doctor if you think you are having a problem with your medicine. Care of the catheter site    · For 1 or 2 days, keep a bandage over the spot where the catheter was inserted. The bandage probably will fall off in this time.     · Put ice or a cold pack on the area for 10 to 20 minutes at a time to help with soreness or swelling. Put a thin cloth between the ice and your skin.     · You may shower 24 to 48 hours after the procedure, if your doctor okays it. Pat the incision dry.     · Do not soak the catheter site until it is healed. Don't take a bath for 1 week, or until your doctor tells you it is okay.     · Watch for bleeding from the site. A small amount of blood (up to the size of a quarter) on the bandage can be normal.     · If you are bleeding, lie down and press on the area for 15 minutes to try to make it stop. If the bleeding does not stop, call your doctor or seek immediate medical care. Follow-up care is a key part of your treatment and safety. Be sure to make and go to all appointments, and call your doctor if you are having problems. It's also a good idea to know your test results and keep a list of the medicines you take. When should you call for help? Call 911 anytime you think you may need emergency care. For example, call if:    · You passed out (lost consciousness).     · You have severe trouble breathing.     · You have sudden chest pain and shortness of breath, or you cough up blood.     · You have symptoms of a heart attack, such as:  ? Chest pain or pressure. ? Sweating. ? Shortness of breath. ? Nausea or vomiting. ? Pain that spreads from the chest to the neck, jaw, or one or both shoulders or arms. ? Dizziness or lightheadedness. ? A fast or uneven pulse. After calling 911, chew 1 adult-strength aspirin. Wait for an ambulance.  Do not try to drive yourself.     · You have been diagnosed with angina, and you have angina symptoms that do not go away with rest or are not getting better within 5 minutes after you take one dose of nitroglycerin. Call your doctor now or seek immediate medical care if:    · You are bleeding from the area where the catheter was put in your artery.     · You have a fast-growing, painful lump at the catheter site.     · You have signs of infection, such as:  ? Increased pain, swelling, warmth, or redness. ? Red streaks leading from the catheter site. ? Pus draining from the catheter site. ? A fever.     · Your leg, arm, or hand is painful, looks blue, or feels cold, numb, or tingly. Watch closely for changes in your health, and be sure to contact your doctor if you have any problems. Where can you learn more? Go to http://www.gray.com/  Enter I611 in the search box to learn more about \"Coronary Angioplasty: What to Expect at Home. \"  Current as of: August 31, 2020               Content Version: 12.8  © 4628-2098 [employer]. Care instructions adapted under license by [employer] (which disclaims liability or warranty for this information). If you have questions about a medical condition or this instruction, always ask your healthcare professional. [place] [address] any warranty or liability for your use of this information.

## 2021-08-05 NOTE — PROGRESS NOTES
TRANSFER - OUT REPORT:    Verbal report given to RN(name) on Pascual Sue  being transferred to CPRU(unit) for routine progression of care       Report consisted of patients Situation, Background, Assessment and   Recommendations(SBAR). Information from the following report(s) SBAR was reviewed with the receiving nurse.     [facility] with Dr Yogesh Dyer  No interventions  R Radial  TR band at  Versed 5mg  Heparin 4000 units  Plavix 300mg

## 2021-08-05 NOTE — PROCEDURES
Cardiac cath was performed. 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 This was successfully treated with high-pressure focal angioplasty.

## 2021-08-05 NOTE — DISCHARGE SUMMARY
[address] [place] [address] 121 Cardiology Discharge Summary     Patient ID:  Janine Link  [id number]  80 y.o.  1950    Admit date: 2021    Discharge date:  21      Admitting Physician: Russel Holden MD     Discharge Physician: Surekha Woods NP/[facility]    Admission Diagnoses: Precordial chest pain [R07.2]    Discharge Diagnoses:    Diagnosis    CAD S/P percutaneous coronary angioplasty    Delirium    Sepsis [facility])    Acute delirium    Hypertensive urgency, malignant    Acute kidney injury ([facility] [address]    Chest pain    Elevated troponin    Aspiration pneumonia ([facility] [address]    Leukocytosis    Hypertension    Prostate cancer ([facility] [address]    Chronic renal insufficiency, stage III (moderate) ([facility] [address]    Mixed hyperlipidemia    Erectile dysfunction following simple prostatectomy       Cardiology Procedures this admission:  Left heart catheterization with PCI  Consults: None    Hospital Course: Patient was seen at the office of [address] [place] [address] Cardiology by Dr. [facility] for complaints of CP and was subsequently scheduled for a LHC at [facility] on 21   Patient underwent cardiac catheterization by Dr. N[facility] JR[facility]. Patient was found to have the followin. Coronary artery disease as described. There is mild to moderate focal left main disease. There is heavy coronary artery calcification. The stented proximal LAD shows no restenosis. The stented left circumflex shows high-grade focal restenosis. There is a moderate size ramus branch with tight ostial disease. The right coronary artery is a large vessel with diffuse disease. 2.  Successful PCI to the left circumflex is performed described above. A good result was obtained with high-pressure balloon and high-pressure Cutting Balloon atherectomy. Patient tolerated the procedure well and was taken to the telemetry floor for recovery. The following morning patient was up feeling well without any complaints of chest pain or shortness of breath.  Patient's right radial cath site was clean, dry and intact without hematoma or bruit. Patient's labs were WNL. Patient was seen and examined by Dr. Johnathan Daley and determined stable and ready for discharge. Patient was instructed on the importance of medication compliance including taking ASA and Plavix. After having a PABO, the patient will remain on dual anti-platelet therapy for 1 year. For maximized medical therapy for CAD, patient will continue ACE and Statin as well. No BB due to junctional rhythm post procedure with bradycardia. This can be reconsidered in the outpatient setting. The patient will follow up with [address] [place] [address] 121 Cardiology in 2 weeks and has been referred to cardiac rehab. DISPOSITION: The patient is being discharged home in stable condition on a low saturated fat, low cholesterol and low salt diet. The patient is instructed to advance activities as tolerated to the limit of fatigue or shortness of breath. The patient is instructed to avoid all heavy lifting for 5 days. The patient is instructed to watch the cath site for bleeding/oozing; if seen, the patient is instructed to apply firm pressure with a clean cloth and call [address] [place] [address] Cardiology at [phone]. The patient is instructed to watch for signs of infection which include: increasing area of redness, fever/hot to touch or purulent drainage at the catheterization site. The patient is instructed not to soak in a bathtub for 7-10 days, but is cleared to shower. The patient is instructed to call the office or return to the ER for immediate evaluation for any shortness of breath or chest pain not relieved by NTG. Discharge Exam:   Visit Vitals  /72   Pulse (!) 46   Resp 16   Ht 5' 9\" (1.753 m)   Wt 83.9 kg (185 lb)   SpO2 99%   BMI 27.32 kg/m²     Patient has been seen by Dr. Johnathan Daley see his progress note for exam details.     Recent Results (from the past 24 hour(s))   CBC W/O DIFF    Collection Time: 08/05/21  8:39 AM   Result Value Ref Range    WBC 7.8 4.3 - 11.1 K/uL    RBC 5.07 4.23 - 5.6 M/uL    HGB 13.9 13.6 - 17.2 g/dL    HCT 43.7 41.1 - 50.3 %    MCV 86.2 79.6 - 97.8 FL    MCH 27.4 26.1 - 32.9 PG    MCHC 31.8 31.4 - 35.0 g/dL    RDW 14.1 11.9 - 14.6 %    PLATELET 430 295 - 758 K/uL    MPV 11.6 9.4 - 12.3 FL    ABSOLUTE NRBC 0.00 0.0 - 0.2 K/uL   MAGNESIUM    Collection Time: 08/05/21  8:39 AM   Result Value Ref Range    Magnesium 1.7 (L) 1.8 - 2.4 mg/dL   METABOLIC PANEL, BASIC    Collection Time: 08/05/21  8:39 AM   Result Value Ref Range    Sodium 142 136 - 145 mmol/L    Potassium 4.4 3.5 - 5.1 mmol/L    Chloride 109 (H) 98 - 107 mmol/L    CO2 25 21 - 32 mmol/L    Anion gap 8 7 - 16 mmol/L    Glucose 98 65 - 100 mg/dL    BUN 25 (H) 8 - 23 MG/DL    Creatinine 1.07 0.8 - 1.5 MG/DL    GFR est AA >60 >60 ml/min/1.73m2    GFR est non-AA >60 >60 ml/min/1.73m2    Calcium 9.8 8.3 - 10.4 MG/DL   EKG, 12 LEAD, INITIAL    Collection Time: 08/05/21  9:02 AM   Result Value Ref Range    Ventricular Rate 64 BPM    Atrial Rate 64 BPM    P-R Interval 186 ms    QRS Duration 144 ms    Q-T Interval 476 ms    QTC Calculation (Bezet) 491 ms    Calculated P Axis 13 degrees    Calculated R Axis -57 degrees    Calculated T Axis 59 degrees    Diagnosis       Normal sinus rhythm with sinus arrhythmia  Possible Left atrial enlargement  Right bundle branch block  Left anterior fascicular block  !!!  Bifascicular block !!!  Lateral infarct (cited on or before 28-OCT-2020)  Abnormal ECG  When compared with ECG of 28-OCT-2020 07:36,  Premature atrial complexes are no longer Present  Confirmed by Jerry Hendrix MD ()DIANA ([id number]) on 8/5/2021 11:19:12 AM     POC ACTIVATED CLOTTING TIME    Collection Time: 08/05/21 11:05 AM   Result Value Ref Range    Activated Clotting Time (POC) 307 (H) 70 - 128 SECS   EKG, 12 LEAD, INITIAL    Collection Time: 08/05/21 12:13 PM   Result Value Ref Range    Ventricular Rate 41 BPM    Atrial Rate 41 BPM    P-R Interval 152 ms    QRS Duration 148 ms    Q-T Interval 526 ms    QTC Calculation (Bezet) 433 ms    Calculated P Axis 54 degrees    Calculated R Axis -57 degrees    Calculated T Axis -128 degrees    Diagnosis       Marked sinus bradycardia  Right bundle branch block  Left anterior fascicular block  !!! Bifascicular block !!!  Lateral infarct (cited on or before 28-OCT-2020)  Abnormal ECG  When compared with ECG of 05-AUG-2021 09:02,  Vent. rate has decreased BY  23 BPM  Nonspecific T wave abnormality now evident in Inferior leads  T wave inversion now evident in Lateral leads  QT has shortened           Patient Instructions:       Current Discharge Medication List        CONTINUE these medications which have NOT CHANGED    Details   lisinopriL (PRINIVIL, ZESTRIL) 30 mg tablet Take 1 Tab by mouth daily. Qty: 90 Tab, Refills: 3      atorvastatin (LIPITOR) 40 mg tablet Take 1 Tab by mouth daily. Qty: 90 Tab, Refills: 3      clopidogreL (Plavix) 75 mg tab Take 4 tabs once today then 1 tab q day thereafter. Qty: 90 Tab, Refills: 3      aspirin 81 mg chewable tablet Take 1 Tab by mouth daily. Qty: 90 Tab, Refills: 0      nitroglycerin (NITROSTAT) 0.4 mg SL tablet 1 Tablet by SubLINGual route every five (5) minutes as needed for Chest Pain. Up to 3 doses.   Qty: 1 Bottle, Refills: 1           STOP taking these medications       metoprolol succinate (TOPROL-XL) 25 mg XL tablet Comments:   Reason for Stopping:                 Signed:  JOHNSON Chen  8/5/2021  2:12 PM

## 2021-08-05 NOTE — Clinical Note
Lesion located in the Mid Cx. Balloon inflated using multiple inflation technique. Lesion #1: Pressure = 12 jocelin; Duration = 17 sec. Inflation 2: Pressure = 22 jocelin; Duration = 24 sec. Inflation 3: Pressure = 22 jocelin; Duration = 11 sec.

## 2021-08-06 NOTE — PROGRESS NOTES
SAME DAY DISCHARGE FOLLOW UP PHONE CALL           NAME : Jessy Mckenzie           : 1950                    DATE/TIME:   21 (Diane Lea)                           MRN# [id number]        3. Ensure that the patient has had their new prescriptions filled & ask if they have taken their anti-platelet & aspirin that day. Spoke with wife who states pt has taken asa & plavix today. Reinforced the importance of continuing both these medications daily & to not stop for any reason without discussing with the cardiologist    2. Ask about access site. Have the patient assess for any signs of a hematoma. Ask about any pain at access site. Reports right radial site without bleeding hematoma, pain, numbness or swelling      3. Ask the patient if they had experienced any chest pain or shortness of breath. Reports pt has done great today.  Denies any chest pain or shortness of breath

## 2022-01-27 ENCOUNTER — APPOINTMENT (RX ONLY)
Dept: URBAN - METROPOLITAN AREA CLINIC 330 | Facility: CLINIC | Age: 72
Setting detail: DERMATOLOGY
End: 2022-01-27

## 2022-01-27 DIAGNOSIS — L82.1 OTHER SEBORRHEIC KERATOSIS: ICD-10-CM

## 2022-01-27 DIAGNOSIS — D22 MELANOCYTIC NEVI: ICD-10-CM

## 2022-01-27 DIAGNOSIS — L30.9 DERMATITIS, UNSPECIFIED: ICD-10-CM

## 2022-01-27 DIAGNOSIS — L81.4 OTHER MELANIN HYPERPIGMENTATION: ICD-10-CM

## 2022-01-27 DIAGNOSIS — L91.8 OTHER HYPERTROPHIC DISORDERS OF THE SKIN: ICD-10-CM

## 2022-01-27 PROBLEM — D22.62 MELANOCYTIC NEVI OF LEFT UPPER LIMB, INCLUDING SHOULDER: Status: ACTIVE | Noted: 2022-01-27

## 2022-01-27 PROBLEM — D22.5 MELANOCYTIC NEVI OF TRUNK: Status: ACTIVE | Noted: 2022-01-27

## 2022-01-27 PROCEDURE — ? COUNSELING

## 2022-01-27 PROCEDURE — ? SKIN TAG REMOVAL MULTI

## 2022-01-27 PROCEDURE — 99213 OFFICE O/P EST LOW 20 MIN: CPT | Mod: 25

## 2022-01-27 PROCEDURE — ? FULL BODY SKIN EXAM

## 2022-01-27 PROCEDURE — ? ADDITIONAL NOTES

## 2022-01-27 PROCEDURE — ? PRESCRIPTION MEDICATION MANAGEMENT

## 2022-01-27 PROCEDURE — ? TREATMENT REGIMEN

## 2022-01-27 PROCEDURE — ? PRESCRIPTION

## 2022-01-27 PROCEDURE — 11200 RMVL SKIN TAGS UP TO&INC 15: CPT

## 2022-01-27 RX ORDER — TRIAMCINOLONE ACETONIDE 1 MG/G
CREAM TOPICAL BID
Qty: 80 | Refills: 4 | Status: ERX | COMMUNITY
Start: 2022-01-27

## 2022-01-27 RX ORDER — KETOCONAZOLE 20 MG/ML
SHAMPOO, SUSPENSION TOPICAL
Qty: 120 | Refills: 4 | Status: ERX | COMMUNITY
Start: 2022-01-27

## 2022-01-27 RX ADMIN — TRIAMCINOLONE ACETONIDE: 1 CREAM TOPICAL at 00:00

## 2022-01-27 RX ADMIN — KETOCONAZOLE: 20 SHAMPOO, SUSPENSION TOPICAL at 00:00

## 2022-01-27 ASSESSMENT — LOCATION SIMPLE DESCRIPTION DERM
LOCATION SIMPLE: RIGHT UPPER BACK
LOCATION SIMPLE: LEFT SHOULDER
LOCATION SIMPLE: ABDOMEN
LOCATION SIMPLE: LEFT UPPER BACK
LOCATION SIMPLE: RIGHT POPLITEAL SKIN
LOCATION SIMPLE: LEFT LOWER BACK
LOCATION SIMPLE: CHEST
LOCATION SIMPLE: RIGHT POSTERIOR UPPER ARM
LOCATION SIMPLE: UPPER BACK
LOCATION SIMPLE: LEFT POSTERIOR UPPER ARM

## 2022-01-27 ASSESSMENT — LOCATION DETAILED DESCRIPTION DERM
LOCATION DETAILED: LEFT INFERIOR MEDIAL MIDBACK
LOCATION DETAILED: RIGHT DISTAL POSTERIOR UPPER ARM
LOCATION DETAILED: LEFT ANTERIOR SHOULDER
LOCATION DETAILED: RIGHT LATERAL INFERIOR CHEST
LOCATION DETAILED: LEFT LATERAL UPPER BACK
LOCATION DETAILED: RIGHT LATERAL SUPERIOR CHEST
LOCATION DETAILED: LEFT POSTERIOR SHOULDER
LOCATION DETAILED: LEFT MEDIAL INFERIOR CHEST
LOCATION DETAILED: LEFT RIB CAGE
LOCATION DETAILED: RIGHT POPLITEAL SKIN
LOCATION DETAILED: PERIUMBILICAL SKIN
LOCATION DETAILED: LEFT DISTAL POSTERIOR UPPER ARM
LOCATION DETAILED: LEFT SUPERIOR LATERAL UPPER BACK
LOCATION DETAILED: INFERIOR THORACIC SPINE
LOCATION DETAILED: LEFT SUPERIOR UPPER BACK
LOCATION DETAILED: LEFT INFERIOR UPPER BACK
LOCATION DETAILED: RIGHT MID-UPPER BACK

## 2022-01-27 ASSESSMENT — LOCATION ZONE DERM
LOCATION ZONE: TRUNK
LOCATION ZONE: ARM
LOCATION ZONE: LEG

## 2022-01-27 NOTE — PROCEDURE: PRESCRIPTION MEDICATION MANAGEMENT
Detail Level: Zone
Render In Strict Bullet Format?: No
Continue Regimen: Ketoconazole shampoo to use as body wash as directed\\nTriamcinolone cream 0.1% bid x 2 wks then as needed for flares.

## 2022-01-27 NOTE — PROCEDURE: SKIN TAG REMOVAL MULTI
Medical Necessity Information: It is in your best interest to select a reason for this procedure from the list below. All of these items fulfill various CMS LCD requirements except the new and changing color options.
Anesthesia Type: 1% lidocaine with epinephrine
Medical Necessity Clause: This procedure was medically necessary because the lesions that were treated were:
Add Associated Diagnoses If Applicable When Selecting Medical Necessity: Yes
Include Z78.9 (Other Specified Conditions Influencing Health Status) As An Associated Diagnosis?: No
Total Number Of Lesions Treated: 1
Consent: Written consent obtained and the risks of skin tag removal was reviewed with the patient including but not limited to bleeding, pigmentary change, infection, pain, and remote possibility of scarring.
Detail Level: Detailed
Anesthesia Volume In Cc: 3

## 2022-03-18 PROBLEM — C61 PROSTATE CANCER (HCC): Status: ACTIVE | Noted: 2018-04-18

## 2022-03-18 PROBLEM — N17.9 ACUTE KIDNEY INJURY (HCC): Status: ACTIVE | Noted: 2020-10-11

## 2022-03-18 PROBLEM — R77.8 ELEVATED TROPONIN: Status: ACTIVE | Noted: 2020-10-10

## 2022-03-18 PROBLEM — E78.2 MIXED HYPERLIPIDEMIA: Status: ACTIVE | Noted: 2017-04-04

## 2022-03-18 PROBLEM — R79.89 ELEVATED TROPONIN: Status: ACTIVE | Noted: 2020-10-10

## 2022-03-19 PROBLEM — N18.30 CHRONIC RENAL INSUFFICIENCY, STAGE III (MODERATE) (HCC): Status: ACTIVE | Noted: 2017-11-07

## 2022-03-19 PROBLEM — I10 HYPERTENSION: Status: ACTIVE | Noted: 2018-04-18

## 2022-03-19 PROBLEM — R41.0 DELIRIUM: Status: ACTIVE | Noted: 2020-10-12

## 2022-03-19 PROBLEM — J69.0 ASPIRATION PNEUMONIA (HCC): Status: ACTIVE | Noted: 2020-10-10

## 2022-03-19 PROBLEM — R41.0 ACUTE DELIRIUM: Status: ACTIVE | Noted: 2020-10-11

## 2022-03-19 PROBLEM — I16.0 HYPERTENSIVE URGENCY, MALIGNANT: Status: ACTIVE | Noted: 2020-10-11

## 2022-03-19 PROBLEM — A41.9 SEPSIS (HCC): Status: ACTIVE | Noted: 2020-10-11

## 2022-03-19 PROBLEM — R07.9 CHEST PAIN: Status: ACTIVE | Noted: 2020-10-10

## 2022-03-20 PROBLEM — Z98.61 CAD S/P PERCUTANEOUS CORONARY ANGIOPLASTY: Status: ACTIVE | Noted: 2020-10-28

## 2022-03-20 PROBLEM — D72.829 LEUKOCYTOSIS: Status: ACTIVE | Noted: 2020-10-10

## 2022-03-20 PROBLEM — I25.10 CAD S/P PERCUTANEOUS CORONARY ANGIOPLASTY: Status: ACTIVE | Noted: 2020-10-28

## 2022-07-11 NOTE — ROUTINE PROCESS
Verbal bedside report given to jovita Muro RN. Patient's situation, background, assessment and recommendations provided. Opportunity for questions provided. Oncoming RN assumed care of patient. Heparin IV drip verified at bedside with oncoming RN. Complex Repair And V-Y Plasty Text: The defect edges were debeveled with a #15 scalpel blade.  The primary defect was closed partially with a complex linear closure.  Given the location of the remaining defect, shape of the defect and the proximity to free margins a V-Y plasty was deemed most appropriate for complete closure of the defect.  Using a sterile surgical marker, an appropriate advancement flap was drawn incorporating the defect and placing the expected incisions within the relaxed skin tension lines where possible.    The area thus outlined was incised deep to adipose tissue with a #15 scalpel blade.  The skin margins were undermined to an appropriate distance in all directions utilizing iris scissors.

## 2022-11-30 ENCOUNTER — APPOINTMENT (RX ONLY)
Dept: URBAN - METROPOLITAN AREA CLINIC 329 | Facility: CLINIC | Age: 72
Setting detail: DERMATOLOGY
End: 2022-11-30

## 2022-11-30 DIAGNOSIS — D22 MELANOCYTIC NEVI: ICD-10-CM

## 2022-11-30 DIAGNOSIS — L82.0 INFLAMED SEBORRHEIC KERATOSIS: ICD-10-CM

## 2022-11-30 DIAGNOSIS — L81.4 OTHER MELANIN HYPERPIGMENTATION: ICD-10-CM

## 2022-11-30 PROBLEM — D22.5 MELANOCYTIC NEVI OF TRUNK: Status: ACTIVE | Noted: 2022-11-30

## 2022-11-30 PROBLEM — D22.61 MELANOCYTIC NEVI OF RIGHT UPPER LIMB, INCLUDING SHOULDER: Status: ACTIVE | Noted: 2022-11-30

## 2022-11-30 PROBLEM — D48.5 NEOPLASM OF UNCERTAIN BEHAVIOR OF SKIN: Status: ACTIVE | Noted: 2022-11-30

## 2022-11-30 PROBLEM — D22.62 MELANOCYTIC NEVI OF LEFT UPPER LIMB, INCLUDING SHOULDER: Status: ACTIVE | Noted: 2022-11-30

## 2022-11-30 PROCEDURE — 11302 SHAVE SKIN LESION 1.1-2.0 CM: CPT

## 2022-11-30 PROCEDURE — 99203 OFFICE O/P NEW LOW 30 MIN: CPT | Mod: 25

## 2022-11-30 PROCEDURE — 17110 DESTRUCTION B9 LES UP TO 14: CPT | Mod: 59

## 2022-11-30 PROCEDURE — ? LIQUID NITROGEN

## 2022-11-30 PROCEDURE — ? COUNSELING

## 2022-11-30 PROCEDURE — ? SUNSCREEN RECOMMENDATIONS

## 2022-11-30 PROCEDURE — ? SHAVE REMOVAL

## 2022-11-30 ASSESSMENT — LOCATION SIMPLE DESCRIPTION DERM
LOCATION SIMPLE: ABDOMEN
LOCATION SIMPLE: RIGHT UPPER BACK
LOCATION SIMPLE: RIGHT LOWER BACK
LOCATION SIMPLE: LEFT FOREARM
LOCATION SIMPLE: LEFT UPPER BACK
LOCATION SIMPLE: RIGHT UPPER ARM
LOCATION SIMPLE: CHEST
LOCATION SIMPLE: RIGHT POSTERIOR THIGH
LOCATION SIMPLE: UPPER BACK
LOCATION SIMPLE: RIGHT POPLITEAL SKIN
LOCATION SIMPLE: LEFT LOWER BACK
LOCATION SIMPLE: RIGHT SHOULDER

## 2022-11-30 ASSESSMENT — LOCATION DETAILED DESCRIPTION DERM
LOCATION DETAILED: RIGHT INFERIOR MEDIAL MIDBACK
LOCATION DETAILED: LEFT MEDIAL UPPER BACK
LOCATION DETAILED: RIGHT ANTERIOR DISTAL UPPER ARM
LOCATION DETAILED: RIGHT SUPERIOR MEDIAL MIDBACK
LOCATION DETAILED: RIGHT MID-UPPER BACK
LOCATION DETAILED: INFERIOR THORACIC SPINE
LOCATION DETAILED: PERIUMBILICAL SKIN
LOCATION DETAILED: LEFT SUPERIOR UPPER BACK
LOCATION DETAILED: LEFT VENTRAL PROXIMAL FOREARM
LOCATION DETAILED: LEFT MID-UPPER BACK
LOCATION DETAILED: RIGHT DISTAL POSTERIOR THIGH
LOCATION DETAILED: RIGHT PROXIMAL POSTERIOR UPPER ARM
LOCATION DETAILED: STERNUM
LOCATION DETAILED: LEFT SUPERIOR LATERAL MIDBACK
LOCATION DETAILED: RIGHT POSTERIOR SHOULDER
LOCATION DETAILED: LEFT PROXIMAL DORSAL FOREARM
LOCATION DETAILED: RIGHT POPLITEAL SKIN
LOCATION DETAILED: RIGHT INFERIOR UPPER BACK
LOCATION DETAILED: LEFT INFERIOR UPPER BACK

## 2022-11-30 ASSESSMENT — LOCATION ZONE DERM
LOCATION ZONE: LEG
LOCATION ZONE: TRUNK
LOCATION ZONE: ARM

## 2022-11-30 NOTE — PROCEDURE: LIQUID NITROGEN
Consent: The patient's consent was obtained including but not limited to risks of crusting, scabbing, blistering, scarring, darker or lighter pigmentary change, recurrence, incomplete removal and infection.
Medical Necessity Clause: This procedure was medically necessary because the lesions that were treated were: bleeding and enlarging
Render Post-Care Instructions In Note?: no
Show Aperture Variable?: Yes
Detail Level: Detailed
Medical Necessity Information: It is in your best interest to select a reason for this procedure from the list below. All of these items fulfill various CMS LCD requirements except the new and changing color options.
Post-Care Instructions: I reviewed with the patient in detail post-care instructions. Patient is to wear sunprotection, and avoid picking at any of the treated lesions. Pt may apply Vaseline to crusted or scabbing areas.
Spray Paint Text: The liquid nitrogen was applied to the skin utilizing a spray paint frosting technique.

## 2023-03-03 DIAGNOSIS — I10 HYPERTENSION: Primary | ICD-10-CM

## 2023-03-03 RX ORDER — LISINOPRIL 30 MG/1
30 TABLET ORAL DAILY
Qty: 90 TABLET | Refills: 3 | Status: SHIPPED | OUTPATIENT
Start: 2023-03-03

## 2023-03-03 NOTE — TELEPHONE ENCOUNTER
Requested Prescriptions     Pending Prescriptions Disp Refills    lisinopril (PRINIVIL;ZESTRIL) 30 MG tablet 90 tablet 3     Sig: Take 1 tablet by mouth daily

## 2023-03-03 NOTE — TELEPHONE ENCOUNTER
MEDICATION REFILL REQUEST      Name of Medication:  lisinopril   Dose:  30 mg  Frequency:    Quantity:    Days' supply:  90      Pharmacy Name/Location:  did not leave

## 2023-03-30 LAB
AVERAGE GLUCOSE: NORMAL
HBA1C MFR BLD: 5.6 %
PROSTATE SPECIFIC ANTIGEN: 0.1 NG/ML

## 2023-04-17 ENCOUNTER — OFFICE VISIT (OUTPATIENT)
Dept: CARDIOLOGY CLINIC | Age: 73
End: 2023-04-17
Payer: MEDICARE

## 2023-04-17 VITALS
DIASTOLIC BLOOD PRESSURE: 80 MMHG | HEART RATE: 66 BPM | HEIGHT: 69 IN | BODY MASS INDEX: 25.62 KG/M2 | WEIGHT: 173 LBS | SYSTOLIC BLOOD PRESSURE: 142 MMHG

## 2023-04-17 DIAGNOSIS — I49.8 JUNCTIONAL RHYTHM: ICD-10-CM

## 2023-04-17 DIAGNOSIS — I25.10 ASCVD (ARTERIOSCLEROTIC CARDIOVASCULAR DISEASE): Primary | ICD-10-CM

## 2023-04-17 DIAGNOSIS — I10 PRIMARY HYPERTENSION: ICD-10-CM

## 2023-04-17 PROCEDURE — G8428 CUR MEDS NOT DOCUMENT: HCPCS | Performed by: INTERNAL MEDICINE

## 2023-04-17 PROCEDURE — 3017F COLORECTAL CA SCREEN DOC REV: CPT | Performed by: INTERNAL MEDICINE

## 2023-04-17 PROCEDURE — 3077F SYST BP >= 140 MM HG: CPT | Performed by: INTERNAL MEDICINE

## 2023-04-17 PROCEDURE — 1036F TOBACCO NON-USER: CPT | Performed by: INTERNAL MEDICINE

## 2023-04-17 PROCEDURE — 99214 OFFICE O/P EST MOD 30 MIN: CPT | Performed by: INTERNAL MEDICINE

## 2023-04-17 PROCEDURE — 1123F ACP DISCUSS/DSCN MKR DOCD: CPT | Performed by: INTERNAL MEDICINE

## 2023-04-17 PROCEDURE — 3079F DIAST BP 80-89 MM HG: CPT | Performed by: INTERNAL MEDICINE

## 2023-04-17 PROCEDURE — 93000 ELECTROCARDIOGRAM COMPLETE: CPT | Performed by: INTERNAL MEDICINE

## 2023-04-17 PROCEDURE — G8417 CALC BMI ABV UP PARAM F/U: HCPCS | Performed by: INTERNAL MEDICINE

## 2023-04-17 RX ORDER — ATORVASTATIN CALCIUM 80 MG/1
TABLET, FILM COATED ORAL
COMMUNITY
Start: 2023-04-13

## 2023-04-17 NOTE — PROGRESS NOTES
Normal heart sounds. Pulmonary:      Effort: Pulmonary effort is normal.      Breath sounds: Normal breath sounds. Abdominal:      General: Abdomen is flat. Palpations: Abdomen is soft. There is no mass. Musculoskeletal:         General: Normal range of motion. Cervical back: Normal range of motion. Skin:     General: Skin is warm and dry. Neurological:      General: No focal deficit present. Mental Status: He is alert and oriented to person, place, and time. Psychiatric:         Mood and Affect: Mood normal.         RECENT LABS AND RECORDS REVIEW    K+1.51, t chol 161, ldl 99    Ekg:  Junctioal rhythm/  Occasional ectopic ventricular beat     -Old lateral infarct  -Right bundle branch block and unusual T-axis -possible true posterior extension of lateral MI  -Right axis -probably secondary to infarct -consider right ventricular hypertrophy. ASSESSMENT and PLAN  1. CAD S/P percutaneous coronary angioplasty  2. Essential hypertension  3. Family history of abdominal aortic aneurysm (AAA), negative US 9/2021    Aminata Kohler was seen today for coronary artery disease and hypertension. Diagnoses and all orders for this visit:    ASCVD (arteriosclerotic cardiovascular disease)  -     EKG 12 lead  Doing well. Continue current rx. Primary hypertension  Doing well. Continue current rx. Junctional rhythm  -     EKG 12 lead  -     Cardiac holter monitor (<= 48 hours); Future       Return in about 1 year (around 4/17/2024).        Esme Gutiérrez MD  4/17/2023  6:50 PM

## 2023-05-03 ENCOUNTER — TELEPHONE (OUTPATIENT)
Dept: CARDIOLOGY CLINIC | Age: 73
End: 2023-05-03

## 2023-05-03 NOTE — TELEPHONE ENCOUNTER
Patient having eyelid surgery on 05/12/23 with Dr. Laura Perez  under General / [facility] 27 . Requesting Aspirin hold for 7 to 10 days prior.  Fax: [phone] ATTN: Deshaun Sepulveda

## 2023-05-03 NOTE — TELEPHONE ENCOUNTER
Patient having eyelid surgery on 05/12/23 with Dr. Bari Barrow  [facility] . Requesting Aspirin hold for 7 to 10 days prior.  Fax: [phone] ATTN: Christy Gonzalez

## 2023-05-03 NOTE — TELEPHONE ENCOUNTER
just to clarify he is cleared to hold ASA 7-10 days prior to procedure.       Copy of this note faxed to [facility] at Fax: [phone]: Rosalie Lloyd

## 2023-07-04 ENCOUNTER — HOSPITAL ENCOUNTER (EMERGENCY)
Age: 73
Discharge: HOME | End: 2023-07-04
Attending: EMERGENCY MEDICINE
Payer: MEDICARE

## 2023-07-04 ENCOUNTER — APPOINTMENT (OUTPATIENT)
Dept: GENERAL RADIOLOGY | Age: 73
End: 2023-07-04
Payer: MEDICARE

## 2023-07-04 ENCOUNTER — APPOINTMENT (OUTPATIENT)
Dept: ULTRASOUND IMAGING | Age: 73
End: 2023-07-04
Payer: MEDICARE

## 2023-07-04 VITALS
HEIGHT: 69 IN | SYSTOLIC BLOOD PRESSURE: 144 MMHG | OXYGEN SATURATION: 98 % | BODY MASS INDEX: 25.62 KG/M2 | TEMPERATURE: 98 F | HEART RATE: 62 BPM | WEIGHT: 173 LBS | RESPIRATION RATE: 16 BRPM | DIASTOLIC BLOOD PRESSURE: 73 MMHG

## 2023-07-04 DIAGNOSIS — M79.89 RIGHT LEG SWELLING: ICD-10-CM

## 2023-07-04 DIAGNOSIS — M79.604 RIGHT LEG PAIN: Primary | ICD-10-CM

## 2023-07-04 PROCEDURE — 99284 EMERGENCY DEPT VISIT MOD MDM: CPT

## 2023-07-04 PROCEDURE — 93971 EXTREMITY STUDY: CPT

## 2023-07-04 PROCEDURE — 73562 X-RAY EXAM OF KNEE 3: CPT

## 2023-07-04 PROCEDURE — 6370000000 HC RX 637 (ALT 250 FOR IP)

## 2023-07-04 RX ORDER — OXYCODONE HYDROCHLORIDE AND ACETAMINOPHEN 5; 325 MG/1; MG/1
1 TABLET ORAL EVERY 6 HOURS PRN
Qty: 6 TABLET | Refills: 0 | Status: SHIPPED | OUTPATIENT
Start: 2023-07-04 | End: 2023-07-06

## 2023-07-04 RX ORDER — OXYCODONE AND ACETAMINOPHEN 7.5; 325 MG/1; MG/1
1 TABLET ORAL
Status: COMPLETED | OUTPATIENT
Start: 2023-07-04 | End: 2023-07-04

## 2023-07-04 RX ORDER — MELOXICAM 15 MG/1
15 TABLET ORAL DAILY
Qty: 14 TABLET | Refills: 0 | Status: SHIPPED | OUTPATIENT
Start: 2023-07-04 | End: 2023-07-18

## 2023-07-04 RX ADMIN — OXYCODONE AND ACETAMINOPHEN 1 TABLET: 7.5; 325 TABLET ORAL at 17:55

## 2023-07-04 ASSESSMENT — PAIN SCALES - GENERAL
PAINLEVEL_OUTOF10: 7
PAINLEVEL_OUTOF10: 8
PAINLEVEL_OUTOF10: 4

## 2023-07-04 ASSESSMENT — PAIN - FUNCTIONAL ASSESSMENT: PAIN_FUNCTIONAL_ASSESSMENT: 0-10

## 2023-07-04 ASSESSMENT — PAIN DESCRIPTION - LOCATION: LOCATION: LEG

## 2023-07-04 ASSESSMENT — PAIN DESCRIPTION - ORIENTATION: ORIENTATION: RIGHT;LOWER

## 2023-07-04 NOTE — DISCHARGE INSTRUCTIONS
There was no evidence of fracture on your x-ray. There was no DVT on your ultrasound. There was a Baker's cyst found on the ultrasound as well as a right calf mass which could be a hematoma. Please follow-up with your primary care provider on 7/6 for a repeat ultrasound and reevaluation. If you do begin to experience any new or worsening symptoms return to the ED.

## 2023-07-04 NOTE — ED TRIAGE NOTES
Pt via wheelchair to triage with family for reports of R lower leg pain. Pt states he initially noticed some swelling to lower leg a few weeks ago. Pt states he was traveling from [place] recently. Pt denies redness but does report increased pain over the last week to the point where it is difficult to bear weight. Pt states he has been using compression socks & took 100mg tramadol earlier today without much relief.

## 2023-07-04 NOTE — ED PROVIDER NOTES
vascularity. Question hematoma versus avascular mass. Clinical follow-up recommended. Baker's cyst in the popliteal fossa. Mira Bui M.D.   7/4/2023 7:30:00 PM        XR KNEE RIGHT (3 VIEWS)   Final Result      1. No acute osseous abnormality. 2.  Small joint effusion. 3.  Mild tricompartmental degenerative changes. Thank you for the referral of this patient. This exam was interpreted by an    American Board of Radiology certified radiologist with subspecialty fellowship    in [address] [place] [address]. If there are any questions regarding this exam please feel free    to contact a radiologist directly at [phone]. Willy Arroyo D.O.    7/4/2023 7:33:00 PM      Vascular duplex lower extremity venous right   Final Result      No evidence of right lower extremity deep venous thrombosis. Right calf mass without vascularity. Question hematoma versus avascular mass. Clinical follow-up recommended. Baker's cyst in the popliteal fossa. Mira Bui M.D.    7/4/2023 7:30:00 PM            Voice dictation software was used during the making of this note. This software is not perfect and grammatical and other typographical errors may be present. This note has not been completely proofread for errors.      Lexy Rizzo  07/04/23 2001

## 2023-07-11 ENCOUNTER — HOSPITAL ENCOUNTER (OUTPATIENT)
Dept: NON INVASIVE DIAGNOSTICS | Age: 73
Discharge: HOME | End: 2023-07-13
Attending: INTERNAL MEDICINE
Payer: MEDICARE

## 2023-07-11 DIAGNOSIS — I49.9 IRREGULAR HEARTBEAT: ICD-10-CM

## 2023-07-11 DIAGNOSIS — R06.09 DOE (DYSPNEA ON EXERTION): ICD-10-CM

## 2023-07-11 DIAGNOSIS — R60.0 EDEMA OF RIGHT LOWER EXTREMITY: ICD-10-CM

## 2023-07-11 LAB
ECHO AO ASC DIAM: 3.4 CM
ECHO AO ROOT DIAM: 4 CM
ECHO AV AREA PEAK VELOCITY: 3 CM2
ECHO AV AREA VTI: 2.8 CM2
ECHO AV MEAN GRADIENT: 2 MMHG
ECHO AV MEAN VELOCITY: 0.7 M/S
ECHO AV PEAK GRADIENT: 4 MMHG
ECHO AV PEAK VELOCITY: 1 M/S
ECHO AV VELOCITY RATIO: 0.8
ECHO AV VTI: 18.8 CM
ECHO EST RA PRESSURE: 15 MMHG
ECHO IVC PROX: 2.9 CM
ECHO LA AREA 2C: 20.8 CM2
ECHO LA AREA 4C: 23.7 CM2
ECHO LA DIAMETER: 4.1 CM
ECHO LA MAJOR AXIS: 6.8 CM
ECHO LA MINOR AXIS: 5.6 CM
ECHO LA TO AORTIC ROOT RATIO: 1.03
ECHO LA VOL 2C: 63 ML (ref 18–58)
ECHO LA VOL 4C: 64 ML (ref 18–58)
ECHO LA VOL BP: 69 ML (ref 18–58)
ECHO LV E' LATERAL VELOCITY: 12 CM/S
ECHO LV E' SEPTAL VELOCITY: 3 CM/S
ECHO LV EDV A2C: 263 ML
ECHO LV EDV A4C: 263 ML
ECHO LV EJECTION FRACTION A2C: 34 %
ECHO LV EJECTION FRACTION A4C: 34 %
ECHO LV EJECTION FRACTION BIPLANE: 35 % (ref 55–100)
ECHO LV ESV A2C: 172 ML
ECHO LV ESV A4C: 175 ML
ECHO LV FRACTIONAL SHORTENING: 18 % (ref 28–44)
ECHO LV INTERNAL DIMENSION DIASTOLIC: 5.6 CM (ref 4.2–5.9)
ECHO LV INTERNAL DIMENSION SYSTOLIC: 4.6 CM
ECHO LV IVSD: 1.4 CM (ref 0.6–1)
ECHO LV MASS 2D: 264.7 G (ref 88–224)
ECHO LV POSTERIOR WALL DIASTOLIC: 0.9 CM (ref 0.6–1)
ECHO LV RELATIVE WALL THICKNESS RATIO: 0.32
ECHO LVOT AREA: 3.8 CM2
ECHO LVOT AV VTI INDEX: 0.74
ECHO LVOT DIAM: 2.2 CM
ECHO LVOT MEAN GRADIENT: 1 MMHG
ECHO LVOT PEAK GRADIENT: 3 MMHG
ECHO LVOT PEAK VELOCITY: 0.8 M/S
ECHO LVOT SV: 52.8 ML
ECHO LVOT VTI: 13.9 CM
ECHO MV AREA VTI: 1.8 CM2
ECHO MV E DECELERATION TIME (DT): 238 MS
ECHO MV E VELOCITY: 1.1 M/S
ECHO MV E/E' LATERAL: 9.17
ECHO MV E/E' RATIO (AVERAGED): 22.92
ECHO MV E/E' SEPTAL: 36.67
ECHO MV LVOT VTI INDEX: 2.13
ECHO MV MAX VELOCITY: 1.3 M/S
ECHO MV MEAN GRADIENT: 2 MMHG
ECHO MV MEAN VELOCITY: 0.7 M/S
ECHO MV PEAK GRADIENT: 7 MMHG
ECHO MV REGURGITANT PEAK GRADIENT: 108 MMHG
ECHO MV REGURGITANT PEAK VELOCITY: 5.2 M/S
ECHO MV REGURGITANT VTIA: 160 CM
ECHO MV VTI: 29.6 CM
ECHO PULMONARY ARTERY END DIASTOLIC PRESSURE: 6 MMHG
ECHO PV ACCELERATION TIME (AT): 95 MS
ECHO PV MAX VELOCITY: 0.9 M/S
ECHO PV PEAK GRADIENT: 3 MMHG
ECHO PV REGURGITANT END DIASTOLIC MAX VELOCITY: 1.3 M/S
ECHO RIGHT VENTRICULAR SYSTOLIC PRESSURE (RVSP): 56 MMHG
ECHO RV BASAL DIMENSION: 5.4 CM
ECHO RV FREE WALL PEAK S': 10 CM/S
ECHO RV INTERNAL DIMENSION: 4 CM
ECHO RV TAPSE: 1.8 CM (ref 1.7–?)
ECHO TV REGURGITANT MAX VELOCITY: 3.21 M/S
ECHO TV REGURGITANT PEAK GRADIENT: 41 MMHG

## 2023-07-11 PROCEDURE — C8929 TTE W OR WO FOL WCON,DOPPLER: HCPCS

## 2023-07-11 PROCEDURE — 2580000003 HC RX 258: Performed by: INTERNAL MEDICINE

## 2023-07-11 PROCEDURE — 6360000004 HC RX CONTRAST MEDICATION: Performed by: INTERNAL MEDICINE

## 2023-07-11 RX ADMIN — SODIUM CHLORIDE, PRESERVATIVE FREE 0.3 ML: 5 INJECTION INTRAVENOUS at 15:50

## 2023-07-15 ENCOUNTER — APPOINTMENT (OUTPATIENT)
Dept: GENERAL RADIOLOGY | Age: 73
End: 2023-07-15
Payer: MEDICARE

## 2023-07-15 ENCOUNTER — APPOINTMENT (OUTPATIENT)
Dept: ULTRASOUND IMAGING | Age: 73
End: 2023-07-15
Payer: MEDICARE

## 2023-07-15 ENCOUNTER — APPOINTMENT (OUTPATIENT)
Age: 73
End: 2023-07-15
Payer: MEDICARE

## 2023-07-15 ENCOUNTER — HOSPITAL ENCOUNTER (EMERGENCY)
Age: 73
Discharge: HOME | End: 2023-07-15
Attending: EMERGENCY MEDICINE
Payer: MEDICARE

## 2023-07-15 VITALS
WEIGHT: 180 LBS | OXYGEN SATURATION: 99 % | RESPIRATION RATE: 18 BRPM | TEMPERATURE: 97.8 F | SYSTOLIC BLOOD PRESSURE: 154 MMHG | HEIGHT: 69 IN | DIASTOLIC BLOOD PRESSURE: 67 MMHG | BODY MASS INDEX: 26.66 KG/M2 | HEART RATE: 60 BPM

## 2023-07-15 DIAGNOSIS — S80.11XA HEMATOMA OF RIGHT LOWER LEG: Primary | ICD-10-CM

## 2023-07-15 LAB
ALBUMIN SERPL-MCNC: 4.1 G/DL (ref 3.2–4.6)
ALBUMIN/GLOB SERPL: 1.7 (ref 0.4–1.6)
ALP SERPL-CCNC: 110 U/L (ref 45–117)
ALT SERPL-CCNC: 28 U/L (ref 13–61)
ANION GAP SERPL CALC-SCNC: 10 MMOL/L (ref 2–11)
AST SERPL-CCNC: 35 U/L (ref 15–37)
BASOPHILS # BLD: 0.1 K/UL (ref 0–0.2)
BASOPHILS NFR BLD: 1 % (ref 0–2)
BILIRUB SERPL-MCNC: 1.4 MG/DL (ref 0.2–1.1)
BUN SERPL-MCNC: 30 MG/DL (ref 8–23)
CALCIUM SERPL-MCNC: 9.9 MG/DL (ref 8.3–10.4)
CHLORIDE SERPL-SCNC: 102 MMOL/L (ref 98–107)
CK SERPL-CCNC: 127 U/L (ref 21–215)
CO2 SERPL-SCNC: 28 MMOL/L (ref 21–32)
CREAT SERPL-MCNC: 1.64 MG/DL (ref 0.8–1.5)
DIFFERENTIAL METHOD BLD: ABNORMAL
EOSINOPHIL # BLD: 0.2 K/UL (ref 0–0.8)
EOSINOPHIL NFR BLD: 3 % (ref 0.5–7.8)
ERYTHROCYTE [DISTWIDTH] IN BLOOD BY AUTOMATED COUNT: 16.3 % (ref 11.9–14.6)
GLOBULIN SER CALC-MCNC: 2.4 G/DL (ref 2.8–4.5)
GLUCOSE SERPL-MCNC: 93 MG/DL (ref 65–100)
HCT VFR BLD AUTO: 34.7 % (ref 41.1–50.3)
HGB BLD-MCNC: 10.6 G/DL (ref 13.6–17.2)
IMM GRANULOCYTES # BLD AUTO: 0 K/UL (ref 0–0.5)
IMM GRANULOCYTES NFR BLD AUTO: 0 % (ref 0–5)
LYMPHOCYTES # BLD: 1.3 K/UL (ref 0.5–4.6)
LYMPHOCYTES NFR BLD: 18 % (ref 13–44)
MCH RBC QN AUTO: 24.5 PG (ref 26.1–32.9)
MCHC RBC AUTO-ENTMCNC: 30.5 G/DL (ref 31.4–35)
MCV RBC AUTO: 80.1 FL (ref 82–102)
MONOCYTES # BLD: 0.8 K/UL (ref 0.1–1.3)
MONOCYTES NFR BLD: 11 % (ref 4–12)
NEUTS SEG # BLD: 5 K/UL (ref 1.7–8.2)
NEUTS SEG NFR BLD: 67 % (ref 43–78)
NRBC # BLD: 0 K/UL (ref 0–0.2)
PLATELET # BLD AUTO: 166 K/UL (ref 150–450)
PMV BLD AUTO: 10.6 FL (ref 9.4–12.3)
POTASSIUM SERPL-SCNC: 4 MMOL/L (ref 3.5–5.1)
PROT SERPL-MCNC: 6.5 G/DL (ref 6.4–8.2)
RBC # BLD AUTO: 4.33 M/UL (ref 4.23–5.6)
SODIUM SERPL-SCNC: 140 MMOL/L (ref 133–143)
WBC # BLD AUTO: 7.5 K/UL (ref 4.3–11.1)

## 2023-07-15 PROCEDURE — 80053 COMPREHEN METABOLIC PANEL: CPT

## 2023-07-15 PROCEDURE — 82550 ASSAY OF CK (CPK): CPT

## 2023-07-15 PROCEDURE — 73718 MRI LOWER EXTREMITY W/O DYE: CPT

## 2023-07-15 PROCEDURE — 73590 X-RAY EXAM OF LOWER LEG: CPT

## 2023-07-15 PROCEDURE — 93971 EXTREMITY STUDY: CPT

## 2023-07-15 PROCEDURE — 85025 COMPLETE CBC W/AUTO DIFF WBC: CPT

## 2023-07-15 PROCEDURE — 99284 EMERGENCY DEPT VISIT MOD MDM: CPT

## 2023-07-15 RX ORDER — OXYCODONE HYDROCHLORIDE AND ACETAMINOPHEN 5; 325 MG/1; MG/1
1 TABLET ORAL EVERY 6 HOURS PRN
Qty: 20 TABLET | Refills: 0 | Status: SHIPPED | OUTPATIENT
Start: 2023-07-15 | End: 2023-07-16 | Stop reason: SDUPTHER

## 2023-07-15 ASSESSMENT — PAIN DESCRIPTION - LOCATION: LOCATION: LEG

## 2023-07-15 ASSESSMENT — PAIN SCALES - GENERAL
PAINLEVEL_OUTOF10: 5
PAINLEVEL_OUTOF10: 8

## 2023-07-15 ASSESSMENT — PAIN DESCRIPTION - ORIENTATION: ORIENTATION: RIGHT

## 2023-07-15 ASSESSMENT — PAIN - FUNCTIONAL ASSESSMENT: PAIN_FUNCTIONAL_ASSESSMENT: 0-10

## 2023-07-15 NOTE — ED TRIAGE NOTES
Pt presents to the ED in a wheelchair with spouse. Pt reports stepping into a hole 10 days ago. Pt states he was seen here on 7/4 and had a xray and ultrasound on right leg. Pt continues to have pain when weight bearing and swelling and pain at right mid tibia.

## 2023-07-15 NOTE — DISCHARGE INSTRUCTIONS
Please return immediately for any numbness or weakness of your foot. I have communicated with ENE Núñez who is on for [facility]. The office will call you Monday to schedule a follow-up appointment.

## 2023-07-15 NOTE — ED PROVIDER NOTES
Emergency Department Provider Note       PCP: Rinku Perkins MD   Age: 68 y.o. Sex: male     DISPOSITION Decision To Discharge 07/15/2023 02:32:43 PM       ICD-10-CM    1. Hematoma of right lower leg  S80.11XA oxyCODONE-acetaminophen (PERCOCET) 5-325 MG per tablet          Medical Decision Making     Complexity of Problems Addressed:  1 or more acute illnesses that pose a threat to life or bodily function. Data Reviewed and Analyzed:  Category 1:   I independently ordered and reviewed each unique test.  I reviewed external records: ED visit note from an outside group. I reviewed external records: provider visit note from PCP. Category 2:     I interpreted the Ultrasound  RLE NO DVT. Category 3: Discussion of management or test interpretation. Nan Ryan is a 68 y.o. male who presents to the Emergency Department with chief complaint of    Chief Complaint   Patient presents with    Leg Pain      Patient is a 60-year-old male with a history of HLD, prostate CA, hypertension who states he had a fall into a hole on 7/2/2023 injuring his right lower extremity. Patient was seen here 7/4/2023 with leg swelling and had an x-ray of his right knee which was negative for acute fracture, as well as a Doppler right lower extremity which was positive for a right popliteal fossa fluid collection measuring 6.3 x 4.6 x 1.4 cm as well as a heterogeneous mass in the right posterior calf measuring 12.8 x 3.9 cm and was questionable hematoma versus avascular mass. Patient has soft compartments at that time per provider's note patient was discharged home. Patient was seen by his PCP 7/6/2023 and no additional work-up was ordered. Patient states he has not had any numbness or tingling of his right foot and no weakness of his right foot. Only pain. Differential diagnosis includes was not limited to right lower extremity hematoma, compartment syndrome.     Patient's physical exam is remarkable

## 2023-07-16 RX ORDER — OXYCODONE HYDROCHLORIDE AND ACETAMINOPHEN 5; 325 MG/1; MG/1
1 TABLET ORAL EVERY 6 HOURS PRN
Qty: 20 TABLET | Refills: 0 | Status: SHIPPED | OUTPATIENT
Start: 2023-07-16 | End: 2023-07-20

## 2023-07-17 ENCOUNTER — OFFICE VISIT (OUTPATIENT)
Age: 73
End: 2023-07-17
Payer: MEDICARE

## 2023-07-17 VITALS
HEIGHT: 69 IN | DIASTOLIC BLOOD PRESSURE: 72 MMHG | BODY MASS INDEX: 25.77 KG/M2 | WEIGHT: 174 LBS | SYSTOLIC BLOOD PRESSURE: 132 MMHG | HEART RATE: 68 BPM

## 2023-07-17 DIAGNOSIS — I25.10 ASCVD (ARTERIOSCLEROTIC CARDIOVASCULAR DISEASE): Primary | ICD-10-CM

## 2023-07-17 DIAGNOSIS — I25.5 ISCHEMIC CARDIOMYOPATHY: ICD-10-CM

## 2023-07-17 PROBLEM — I20.0 ACCELERATING ANGINA (HCC): Status: ACTIVE | Noted: 2023-07-17

## 2023-07-17 PROCEDURE — 99214 OFFICE O/P EST MOD 30 MIN: CPT | Performed by: INTERNAL MEDICINE

## 2023-07-17 PROCEDURE — 3017F COLORECTAL CA SCREEN DOC REV: CPT | Performed by: INTERNAL MEDICINE

## 2023-07-17 PROCEDURE — 1123F ACP DISCUSS/DSCN MKR DOCD: CPT | Performed by: INTERNAL MEDICINE

## 2023-07-17 PROCEDURE — 1036F TOBACCO NON-USER: CPT | Performed by: INTERNAL MEDICINE

## 2023-07-17 PROCEDURE — 3078F DIAST BP <80 MM HG: CPT | Performed by: INTERNAL MEDICINE

## 2023-07-17 PROCEDURE — 3075F SYST BP GE 130 - 139MM HG: CPT | Performed by: INTERNAL MEDICINE

## 2023-07-17 PROCEDURE — G8417 CALC BMI ABV UP PARAM F/U: HCPCS | Performed by: INTERNAL MEDICINE

## 2023-07-17 PROCEDURE — G8427 DOCREV CUR MEDS BY ELIG CLIN: HCPCS | Performed by: INTERNAL MEDICINE

## 2023-07-17 RX ORDER — METOPROLOL SUCCINATE 25 MG/1
25 TABLET, EXTENDED RELEASE ORAL DAILY
Qty: 30 TABLET | Refills: 3 | Status: SHIPPED | OUTPATIENT
Start: 2023-07-17

## 2023-07-17 NOTE — PROGRESS NOTES
[facility] CARDIOLOGY  [address] [address] [facility]  PHONE: [phone]        23        NAME:  Alana Munoz  : 1950  MRN: [id number]     CHIEF COMPLAINT:    Coronary Artery Disease      SUBJECTIVE:     Recent ER visit after stepping in a hole. Fu  w/ PCP who noted edema and abnormal ekg and an echo was ordered. Lasix was prescribed and edema resolved. No chest pain or palpitation or dizziness. Medications were all reviewed with the patient today and updated as necessary. Current Outpatient Medications   Medication Sig    metoprolol succinate (TOPROL XL) 25 MG extended release tablet Take 1 tablet by mouth daily    oxyCODONE-acetaminophen (PERCOCET) 5-325 MG per tablet Take 1 tablet by mouth every 6 hours as needed for Pain for up to 5 days. Intended supply: 3 days. Take lowest dose possible to manage pain Max Daily Amount: 4 tablets    atorvastatin (LIPITOR) 80 MG tablet TAKE 1 TABLET BY MOUTH EVERY DAY FOR 90 DAYS    lisinopril (PRINIVIL;ZESTRIL) 30 MG tablet Take 1 tablet by mouth daily    aspirin 81 MG chewable tablet Take 1 tablet by mouth daily    nitroGLYCERIN (NITROSTAT) 0.4 MG SL tablet Place 1 tablet under the tongue    clopidogrel (PLAVIX) 75 MG tablet Take 1 tab daily by mouth (Patient not taking: Reported on 2023)     No current facility-administered medications for this visit. Allergies   Allergen Reactions    Hydrocodone Itching     Other reaction(s): Itching-Allergy  Pt states incorrect/ allergy is to oxycodone. Pt states he can tolerate hydrocodone 10/8/20 Please remove      Codeine Other (See Comments)     convulsions    Oxycodone Itching     Can tolerate Hydrocodone    Zolpidem      Other reaction(s): Other (see comments)  hallucinations.     Propofol Anxiety     Post-anesthesia agitation           PHYSICAL EXAM:     Wt Readings from Last 3 Encounters:   23 174 lb (78.9 kg)   07/15/23 180 lb (81.6 kg)   07/15/23 180 lb
normal performance

## 2023-07-18 ENCOUNTER — PATIENT MESSAGE (OUTPATIENT)
Age: 73
End: 2023-07-18

## 2023-07-18 ENCOUNTER — TELEPHONE (OUTPATIENT)
Age: 73
End: 2023-07-18

## 2023-07-18 DIAGNOSIS — I25.10 ASCVD (ARTERIOSCLEROTIC CARDIOVASCULAR DISEASE): ICD-10-CM

## 2023-07-18 DIAGNOSIS — I25.5 ISCHEMIC CARDIOMYOPATHY: ICD-10-CM

## 2023-07-18 LAB
ALBUMIN SERPL-MCNC: 4.3 G/DL (ref 3.2–4.6)
ALBUMIN/GLOB SERPL: 1.5 (ref 0.4–1.6)
ALP SERPL-CCNC: 127 U/L (ref 50–136)
ALT SERPL-CCNC: 48 U/L (ref 12–65)
ANION GAP SERPL CALC-SCNC: 9 MMOL/L (ref 2–11)
AST SERPL-CCNC: 43 U/L (ref 15–37)
BASOPHILS # BLD: 0.1 K/UL (ref 0–0.2)
BASOPHILS NFR BLD: 1 % (ref 0–2)
BILIRUB SERPL-MCNC: 1.2 MG/DL (ref 0.2–1.1)
BUN SERPL-MCNC: 32 MG/DL (ref 8–23)
CALCIUM SERPL-MCNC: 10.5 MG/DL (ref 8.3–10.4)
CHLORIDE SERPL-SCNC: 104 MMOL/L (ref 101–110)
CO2 SERPL-SCNC: 25 MMOL/L (ref 21–32)
CREAT SERPL-MCNC: 1.7 MG/DL (ref 0.8–1.5)
DIFFERENTIAL METHOD BLD: ABNORMAL
EOSINOPHIL # BLD: 0.3 K/UL (ref 0–0.8)
EOSINOPHIL NFR BLD: 3 % (ref 0.5–7.8)
ERYTHROCYTE [DISTWIDTH] IN BLOOD BY AUTOMATED COUNT: 16.9 % (ref 11.9–14.6)
GLOBULIN SER CALC-MCNC: 2.8 G/DL (ref 2.8–4.5)
GLUCOSE SERPL-MCNC: 92 MG/DL (ref 65–100)
HCT VFR BLD AUTO: 38.6 % (ref 41.1–50.3)
HGB BLD-MCNC: 11.5 G/DL (ref 13.6–17.2)
IMM GRANULOCYTES # BLD AUTO: 0 K/UL (ref 0–0.5)
IMM GRANULOCYTES NFR BLD AUTO: 0 % (ref 0–5)
LYMPHOCYTES # BLD: 1.5 K/UL (ref 0.5–4.6)
LYMPHOCYTES NFR BLD: 19 % (ref 13–44)
MCH RBC QN AUTO: 24.8 PG (ref 26.1–32.9)
MCHC RBC AUTO-ENTMCNC: 29.8 G/DL (ref 31.4–35)
MCV RBC AUTO: 83.4 FL (ref 82–102)
MONOCYTES # BLD: 0.7 K/UL (ref 0.1–1.3)
MONOCYTES NFR BLD: 9 % (ref 4–12)
NEUTS SEG # BLD: 5.5 K/UL (ref 1.7–8.2)
NEUTS SEG NFR BLD: 68 % (ref 43–78)
NRBC # BLD: 0 K/UL (ref 0–0.2)
PLATELET # BLD AUTO: 228 K/UL (ref 150–450)
PMV BLD AUTO: 12.3 FL (ref 9.4–12.3)
POTASSIUM SERPL-SCNC: 4.8 MMOL/L (ref 3.5–5.1)
PROT SERPL-MCNC: 7.1 G/DL (ref 6.3–8.2)
RBC # BLD AUTO: 4.63 M/UL (ref 4.23–5.6)
SODIUM SERPL-SCNC: 138 MMOL/L (ref 133–143)
WBC # BLD AUTO: 8.1 K/UL (ref 4.3–11.1)

## 2023-07-18 NOTE — TELEPHONE ENCOUNTER
Regarding: Cath lab  Contact: [phone]  ----- Message from Kristi Rich sent at 7/18/2023  3:49 PM EDT -----       ----- Message from Alyce Wade to Ceci Okeefe MD sent at 7/18/2023  2:35 PM -----   I left your office feeling I'm going to die during the procedure am I? Bennie Garcia said you will add a Stent as required and I'm glad. But is is correct on the Stent? I did not sleep last night worrying about the Cath Lab procedure. Have I been short of breath recently and I said no. Is Carmen tired gary the same. I need a little reassurance if that's possible.   Respectfully,    Sundeep Eli

## 2023-07-18 NOTE — TELEPHONE ENCOUNTER
I spoke w/pt. he is anxious about the heart cath wanted to know if he is going to die? Is he going to need a stent? I told him if  feels a stent is needed then he would need a stent. I told him  will know waht is needed once he gets in there and looks at things. I  explained to him  has been doing this for a long time and provided reassurance. I told him the risk always have to be explained prior to any procedure. Again provided reassurance. I told him I will send a note to  that pt.has concerns and is really anxious. Pt.said if  wants to call him he can call.

## 2023-07-18 NOTE — TELEPHONE ENCOUNTER
Elidia Muse MA 7/18/2023 2:46 PM EDT      ----- Message -----  From: Tena Officer  Sent: 7/18/2023 2:35 PM EDT  To: [facility] Clinical Staff  Subject: Cath lab     I left your office feeling I'm going to die during the procedure am I? Esperanza Reed said you will add a Stent as required and I'm glad. But is is correct on the Stent? I did not sleep last night worrying about the Cath Lab procedure. Have I been short of breath recently and I said no. Is Carmen tired gary the same. I need a little reassurance if that's possible.   Respectfully,    Vilma Salamanca

## 2023-07-19 ENCOUNTER — TELEPHONE (OUTPATIENT)
Dept: ORTHOPEDIC SURGERY | Age: 73
End: 2023-07-19

## 2023-07-19 NOTE — TELEPHONE ENCOUNTER
Veronica-  I  called  and  gave this pt  the  8/1  date  that  you sent  in a message  for  8/1    He  states  that the  ER told  him  this  was  emergent  and  he  needs to  be  seen  soon. He is  a pt  of  [facility]  already    I believe you offered  him  this   Thursday 7/20 and  he  could  not  come. Is  there anything  else  before  8/1?

## 2023-07-20 ENCOUNTER — HOSPITAL ENCOUNTER (EMERGENCY)
Age: 73
Discharge: HOME | End: 2023-07-20
Attending: EMERGENCY MEDICINE
Payer: MEDICARE

## 2023-07-20 ENCOUNTER — APPOINTMENT (OUTPATIENT)
Dept: ULTRASOUND IMAGING | Age: 73
End: 2023-07-20
Payer: MEDICARE

## 2023-07-20 VITALS
SYSTOLIC BLOOD PRESSURE: 145 MMHG | TEMPERATURE: 97.8 F | RESPIRATION RATE: 16 BRPM | OXYGEN SATURATION: 99 % | DIASTOLIC BLOOD PRESSURE: 80 MMHG | HEART RATE: 58 BPM

## 2023-07-20 DIAGNOSIS — S80.11XA HEMATOMA OF LEG, RIGHT, INITIAL ENCOUNTER: Primary | ICD-10-CM

## 2023-07-20 DIAGNOSIS — N28.9 RENAL INSUFFICIENCY: ICD-10-CM

## 2023-07-20 DIAGNOSIS — I20.0 ACCELERATING ANGINA (HCC): ICD-10-CM

## 2023-07-20 LAB
ALBUMIN SERPL-MCNC: 3.6 G/DL (ref 3.2–4.6)
ALBUMIN/GLOB SERPL: 1 (ref 0.4–1.6)
ALP SERPL-CCNC: 115 U/L (ref 50–136)
ALT SERPL-CCNC: 44 U/L (ref 12–65)
ANION GAP SERPL CALC-SCNC: 3 MMOL/L (ref 2–11)
APTT PPP: 36.6 SEC (ref 24.5–34.2)
AST SERPL-CCNC: 41 U/L (ref 15–37)
BASOPHILS # BLD: 0.1 K/UL (ref 0–0.2)
BASOPHILS NFR BLD: 1 % (ref 0–2)
BILIRUB SERPL-MCNC: 1 MG/DL (ref 0.2–1.1)
BUN SERPL-MCNC: 42 MG/DL (ref 8–23)
CALCIUM SERPL-MCNC: 9.7 MG/DL (ref 8.3–10.4)
CHLORIDE SERPL-SCNC: 106 MMOL/L (ref 101–110)
CO2 SERPL-SCNC: 28 MMOL/L (ref 21–32)
CREAT SERPL-MCNC: 1.9 MG/DL (ref 0.8–1.5)
DIFFERENTIAL METHOD BLD: ABNORMAL
EOSINOPHIL # BLD: 0.2 K/UL (ref 0–0.8)
EOSINOPHIL NFR BLD: 3 % (ref 0.5–7.8)
ERYTHROCYTE [DISTWIDTH] IN BLOOD BY AUTOMATED COUNT: 17.1 % (ref 11.9–14.6)
GLOBULIN SER CALC-MCNC: 3.6 G/DL (ref 2.8–4.5)
GLUCOSE SERPL-MCNC: 99 MG/DL (ref 65–100)
HCT VFR BLD AUTO: 37.2 % (ref 41.1–50.3)
HGB BLD-MCNC: 11.1 G/DL (ref 13.6–17.2)
IMM GRANULOCYTES # BLD AUTO: 0 K/UL (ref 0–0.5)
IMM GRANULOCYTES NFR BLD AUTO: 0 % (ref 0–5)
INR PPP: 1.1
LYMPHOCYTES # BLD: 1.6 K/UL (ref 0.5–4.6)
LYMPHOCYTES NFR BLD: 20 % (ref 13–44)
MCH RBC QN AUTO: 24.6 PG (ref 26.1–32.9)
MCHC RBC AUTO-ENTMCNC: 29.8 G/DL (ref 31.4–35)
MCV RBC AUTO: 82.3 FL (ref 82–102)
MONOCYTES # BLD: 0.8 K/UL (ref 0.1–1.3)
MONOCYTES NFR BLD: 10 % (ref 4–12)
NEUTS SEG # BLD: 5.6 K/UL (ref 1.7–8.2)
NEUTS SEG NFR BLD: 67 % (ref 43–78)
NRBC # BLD: 0 K/UL (ref 0–0.2)
PLATELET # BLD AUTO: 228 K/UL (ref 150–450)
PMV BLD AUTO: 12.1 FL (ref 9.4–12.3)
POTASSIUM SERPL-SCNC: 4.9 MMOL/L (ref 3.5–5.1)
PROT SERPL-MCNC: 7.2 G/DL (ref 6.3–8.2)
PROTHROMBIN TIME: 14.9 SEC (ref 12.6–14.3)
RBC # BLD AUTO: 4.52 M/UL (ref 4.23–5.6)
SODIUM SERPL-SCNC: 137 MMOL/L (ref 133–143)
WBC # BLD AUTO: 8.3 K/UL (ref 4.3–11.1)

## 2023-07-20 PROCEDURE — 99284 EMERGENCY DEPT VISIT MOD MDM: CPT

## 2023-07-20 PROCEDURE — 96375 TX/PRO/DX INJ NEW DRUG ADDON: CPT

## 2023-07-20 PROCEDURE — 85025 COMPLETE CBC W/AUTO DIFF WBC: CPT

## 2023-07-20 PROCEDURE — 96374 THER/PROPH/DIAG INJ IV PUSH: CPT

## 2023-07-20 PROCEDURE — 85730 THROMBOPLASTIN TIME PARTIAL: CPT

## 2023-07-20 PROCEDURE — 80053 COMPREHEN METABOLIC PANEL: CPT

## 2023-07-20 PROCEDURE — 85610 PROTHROMBIN TIME: CPT

## 2023-07-20 PROCEDURE — 6360000002 HC RX W HCPCS: Performed by: EMERGENCY MEDICINE

## 2023-07-20 PROCEDURE — 93971 EXTREMITY STUDY: CPT

## 2023-07-20 RX ORDER — OXYCODONE HYDROCHLORIDE AND ACETAMINOPHEN 5; 325 MG/1; MG/1
1 TABLET ORAL EVERY 6 HOURS PRN
Qty: 15 TABLET | Refills: 0 | Status: SHIPPED | OUTPATIENT
Start: 2023-07-20 | End: 2023-07-24

## 2023-07-20 RX ORDER — ONDANSETRON 2 MG/ML
4 INJECTION INTRAMUSCULAR; INTRAVENOUS
Status: COMPLETED | OUTPATIENT
Start: 2023-07-20 | End: 2023-07-20

## 2023-07-20 RX ORDER — MORPHINE SULFATE 10 MG/ML
6 INJECTION, SOLUTION INTRAMUSCULAR; INTRAVENOUS
Status: COMPLETED | OUTPATIENT
Start: 2023-07-20 | End: 2023-07-20

## 2023-07-20 RX ADMIN — MORPHINE SULFATE 6 MG: 10 INJECTION INTRAVENOUS at 08:14

## 2023-07-20 RX ADMIN — ONDANSETRON 4 MG: 2 INJECTION INTRAMUSCULAR; INTRAVENOUS at 08:14

## 2023-07-20 ASSESSMENT — PAIN SCALES - GENERAL
PAINLEVEL_OUTOF10: 9
PAINLEVEL_OUTOF10: 10

## 2023-07-20 ASSESSMENT — ENCOUNTER SYMPTOMS
BACK PAIN: 0
DIARRHEA: 0
VOMITING: 0
ABDOMINAL PAIN: 0
COUGH: 0
SORE THROAT: 0
NAUSEA: 0
CONSTIPATION: 0
RHINORRHEA: 0
COLOR CHANGE: 1
SHORTNESS OF BREATH: 0

## 2023-07-20 ASSESSMENT — PAIN - FUNCTIONAL ASSESSMENT: PAIN_FUNCTIONAL_ASSESSMENT: 0-10

## 2023-07-20 NOTE — DISCHARGE INSTRUCTIONS
Use ice over your large hematoma in the right calf. Increase your water intake and have your PCP redraw your creatinine level in a couple of days. Follow-up with Ortho tomorrow.

## 2023-07-20 NOTE — ED TRIAGE NOTES
Has mass on R tibia and has severe pain. Has appointment for it in around 2 weeks but the pain was too severe to wait. Is scheduled for a heart cath today at 9.  Took percocet at home

## 2023-07-20 NOTE — ED PROVIDER NOTES
Passive exposure: Past    Smokeless tobacco: Never   Substance and Sexual Activity    Alcohol use: No    Drug use: No        Previous Medications    ASPIRIN 81 MG CHEWABLE TABLET    Take 1 tablet by mouth daily    ATORVASTATIN (LIPITOR) 80 MG TABLET    TAKE 1 TABLET BY MOUTH EVERY DAY FOR 90 DAYS    LISINOPRIL (PRINIVIL;ZESTRIL) 30 MG TABLET    Take 1 tablet by mouth daily    METOPROLOL SUCCINATE (TOPROL XL) 25 MG EXTENDED RELEASE TABLET    Take 1 tablet by mouth daily    NITROGLYCERIN (NITROSTAT) 0.4 MG SL TABLET    Place 1 tablet under the tongue        Results for orders placed or performed during the hospital encounter of 07/20/23   CBC with Auto Differential   Result Value Ref Range    WBC 8.3 4.3 - 11.1 K/uL    RBC 4.52 4.23 - 5.6 M/uL    Hemoglobin 11.1 (L) 13.6 - 17.2 g/dL    Hematocrit 37.2 (L) 41.1 - 50.3 %    MCV 82.3 82 - 102 FL    MCH 24.6 (L) 26.1 - 32.9 PG    MCHC 29.8 (L) 31.4 - 35.0 g/dL    RDW 17.1 (H) 11.9 - 14.6 %    Platelets 433 645 - 674 K/uL    MPV 12.1 9.4 - 12.3 FL    nRBC 0.00 0.0 - 0.2 K/uL    Differential Type AUTOMATED      Neutrophils % 67 43 - 78 %    Lymphocytes % 20 13 - 44 %    Monocytes % 10 4.0 - 12.0 %    Eosinophils % 3 0.5 - 7.8 %    Basophils % 1 0.0 - 2.0 %    Immature Granulocytes 0 0.0 - 5.0 %    Neutrophils Absolute 5.6 1.7 - 8.2 K/UL    Lymphocytes Absolute 1.6 0.5 - 4.6 K/UL    Monocytes Absolute 0.8 0.1 - 1.3 K/UL    Eosinophils Absolute 0.2 0.0 - 0.8 K/UL    Basophils Absolute 0.1 0.0 - 0.2 K/UL    Absolute Immature Granulocyte 0.0 0.0 - 0.5 K/UL   Comprehensive Metabolic Panel   Result Value Ref Range    Sodium 137 133 - 143 mmol/L    Potassium 4.9 3.5 - 5.1 mmol/L    Chloride 106 101 - 110 mmol/L    CO2 28 21 - 32 mmol/L    Anion Gap 3 2 - 11 mmol/L    Glucose 99 65 - 100 mg/dL    BUN 42 (H) 8 - 23 MG/DL    Creatinine 1.90 (H) 0.8 - 1.5 MG/DL    Est, Glom Filt Rate 37 (L) >60 ml/min/1.73m2    Calcium 9.7 8.3 - 10.4 MG/DL    Total Bilirubin 1.0 0.2 - 1.1 MG/DL    ALT 44 12 - 65 U/L    AST 41 (H) 15 - 37 U/L    Alk Phosphatase 115 50 - 136 U/L    Total Protein 7.2 6.3 - 8.2 g/dL    Albumin 3.6 3.2 - 4.6 g/dL    Globulin 3.6 2.8 - 4.5 g/dL    Albumin/Globulin Ratio 1.0 0.4 - 1.6     Protime-INR   Result Value Ref Range    Protime 14.9 (H) 12.6 - 14.3 sec    INR 1.1     APTT   Result Value Ref Range    PTT 36.6 (H) 24.5 - 34.2 SEC   Vascular duplex lower extremity venous right    Narrative    Right lower extremity duplex venous doppler exam.    Indication: Pain, recent right lower extremity trauma, hematoma    COMPARISON: Ultrasound 7/15/2023. Technique: Gray-scale ultrasound with and without compression and color Doppler  evaluation were performed of the deep veins of the right lower extremity from  the level of the right common femoral vein to the level of the right popliteal  vein. Peroneal and posterior tibial veins also interrogated. Findings: The right common femoral vein, right femoral vein, peroneal, posterior tibial,  and right popliteal veins are compressible and opacify with color at color  Doppler evaluation. There is no evidence of deep vein thrombosis. 7 cm Leslie cyst again seen. Complex collection measuring 18 x 5 x 7 cm in the posterior upper calf      Impression    1. No significant interval change in the right calf hematoma. 2. No DVT. Vascular duplex lower extremity venous right   Final Result   1. No significant interval change in the right calf hematoma. 2. No DVT. Voice dictation software was used during the making of this note. This software is not perfect and grammatical and other typographical errors may be present. This note has not been completely proofread for errors.      Delia Medrano III, MD  07/20/23 0959

## 2023-07-21 ENCOUNTER — PATIENT MESSAGE (OUTPATIENT)
Age: 73
End: 2023-07-21

## 2023-07-21 ENCOUNTER — TELEPHONE (OUTPATIENT)
Age: 73
End: 2023-07-21

## 2023-07-21 ENCOUNTER — OFFICE VISIT (OUTPATIENT)
Dept: ORTHOPEDIC SURGERY | Age: 73
End: 2023-07-21

## 2023-07-21 DIAGNOSIS — S99.911A INJURY OF RIGHT ANKLE, INITIAL ENCOUNTER: Primary | ICD-10-CM

## 2023-07-21 NOTE — PROGRESS NOTES
muscles suggesting partial myofascial tear. Differential Diagnosis:     Medial head of the gastroc tear with large hematoma     Treatment Plan:   I had a thorough discussion with the patient regarding the Treatment Plan    4 This is an acute complicated injury    I had a thoroughly informed the patient of the plan for treatment. I discussed non operative treatment initially. We discussed  warm compresses, activity modification and allowing this hematoma to the resolve spontaneously. I explained that I do not think he is having active bleeding. I do not think he is having increasing bleeding as the MRI does not indicate a growing mass but there is concern based upon the mass that and evacuation will be needed in order to limit his pain. However he can attempt nonoperative management we can follow-up. .  At this point Non surgical treatment has not relieved the patient symptoms    We discussed multiple surgical options. We discussed surgery to be evacuation of the hematoma, drain placement and compressive dressings. .   I discussed with this patient  the risk associated with the outlined surgery. These risk include infection, delayed wound healing, hardware failure, painful hardware, recurring wounds, recurring pain, damage to surrounding structures such as nerves, vessels, tendons, ligaments, and joints. I discussed how no surgery is perfect and this surgery may not be successful. There is also risk of anesthesia such as nerve damage from local anesthetic, damage to the airway or mouth structures, respiratory distress, cardiac disease exacerbation, potential arrhythmias, and even death. The patient verbalized understanding of each of these risk  The patient was thoroughly informed regarding the Treatment Plan. Through shared decision making the patient elected to proceed with surgery and consented to the procedure.     I explained that he needs to be worked up by his cardiologist.  He was scheduled to

## 2023-07-21 NOTE — TELEPHONE ENCOUNTER
Cardiac Clearance        Physician or Practice Requesting:Dr Sheets   : [address] Phone Number: [phone]  Fax Number: [phone]   Dixie Cotoria  Date of Surgery/Procedure: ASAP   Type of Surgery or Procedure: excision of hematoma   Type of Anesthesia: block with MAC   Type of Clearance Requested: Cardiac Clearance and Medication Hold  Medication to Hold:ASA   Days to Hold: ? Maybe a day ?      NEEDS TO KNOW TODAY they also sent Dr Ric Ahumada a [facility] Chart message

## 2023-07-21 NOTE — H&P (VIEW-ONLY)
have a heart cath yesterday but did not have it completed due to his calf issue. He will need to be worked up by his cardiologist, he cannot be on blood thinners after surgery as I have concerns about continuing bleeding we need to make sure that we do not have a cardiac issue nor a hematoma that recurs. Past Medical History:   Diagnosis Date    AION (acute ischemic optic neuropathy)     AION (acute ischemic optic neuropathy), bilateral     CAD (coronary artery disease)     Former smoker, stopped smoking in distant past     1971 quit    Hepatitis A 1980    Hypercholesterolemia     Hypertension     Prostate cancer ([address]) 2009    Wears hearing aid in both ears          Current Outpatient Medications:     oxyCODONE-acetaminophen (PERCOCET) 5-325 MG per tablet, Take 1 tablet by mouth every 6 hours as needed for Pain for up to 4 days. Intended supply: 3 days.  Take lowest dose possible to manage pain Max Daily Amount: 4 tablets, Disp: 15 tablet, Rfl: 0    metoprolol succinate (TOPROL XL) 25 MG extended release tablet, Take 1 tablet by mouth daily, Disp: 30 tablet, Rfl: 3    atorvastatin (LIPITOR) 80 MG tablet, TAKE 1 TABLET BY MOUTH EVERY DAY FOR 90 DAYS, Disp: , Rfl:     lisinopril (PRINIVIL;ZESTRIL) 30 MG tablet, Take 1 tablet by mouth daily, Disp: 90 tablet, Rfl: 3    aspirin 81 MG chewable tablet, Take 1 tablet by mouth daily, Disp: , Rfl:     nitroGLYCERIN (NITROSTAT) 0.4 MG SL tablet, Place 1 tablet under the tongue, Disp: , Rfl:

## 2023-07-24 ENCOUNTER — TELEPHONE (OUTPATIENT)
Age: 73
End: 2023-07-24

## 2023-07-24 ENCOUNTER — PATIENT MESSAGE (OUTPATIENT)
Age: 73
End: 2023-07-24

## 2023-07-24 ENCOUNTER — CLINICAL DOCUMENTATION (OUTPATIENT)
Dept: ORTHOPEDIC SURGERY | Age: 73
End: 2023-07-24

## 2023-07-24 PROBLEM — M79.661 RIGHT CALF PAIN: Status: ACTIVE | Noted: 2023-07-24

## 2023-07-24 PROBLEM — T14.8XXA HEMATOMA: Status: ACTIVE | Noted: 2023-07-24

## 2023-07-24 NOTE — PROGRESS NOTES
MD Donte Lawrence MA  He is cleared for this procedure           Previous Messages       ----- Message -----   From: Kristi Kennedy   Sent: 7/21/2023  12:46 PM EDT   To: Becky Hernandez MD   Subject: Office visit today 7/21                            Patient has a right calf hematoma , we need to excise hematoma please advise   July 21, 2023     [facility]   [facility] [id number]     We need to obtain cardiac clearance that he is cleared under a cardiac perspective for surgery       Type of surgery - Hematoma Excison / Drainage   Anesthesia - POP SAPH BLOCK WITH MAC       Urgent:   Once we obtain clearance we can schedule the surgery. Patient can not be on any blood thinners after surgery.        Any questions please call Juan Kauffman MD

## 2023-07-25 ENCOUNTER — PATIENT MESSAGE (OUTPATIENT)
Age: 73
End: 2023-07-25

## 2023-07-25 ENCOUNTER — TELEPHONE (OUTPATIENT)
Age: 73
End: 2023-07-25

## 2023-07-25 RX ORDER — AMLODIPINE BESYLATE 5 MG/1
5 TABLET ORAL DAILY
Qty: 30 TABLET | Refills: 11 | Status: SHIPPED | OUTPATIENT
Start: 2023-07-25 | End: 2023-07-26

## 2023-07-25 NOTE — TELEPHONE ENCOUNTER
I notified the wife that I have sent a message to the cath lab to reschedule pt.'s heart cath. She then says she is very frustrated because no one called her to reschedule the cath. Husbands EF is 30%. Also reports she has sent multiple my chart messages w/no response. Her husbands HR has been low. See below [employer] message:    Dr. Gio Shane. Micks pulse has been 47, 52,53,69(yesterday) 58today. I held his metroprol. .do you want him still on it or on something else? BP. 121/51  138/51  145/63   173/56  182/72. Anxious this am.  I think it's up now because he broke something, frustration  Wife is a nurse feels pt.needs something besides a BB because HR is too low. Please advise. Shameka Carmona

## 2023-07-25 NOTE — TELEPHONE ENCOUNTER
,  We discussed pt.earlier today due to BB causing lower HR. You said he can stop the BB and add Norvasc 5mg qday. Pt.son called back said pt.already has swelling in his feet and wants to know if you would prescribe the drug Eplerenone instead? I informed son Norvasc usually causes swelling at the higher dose but he said his father is not going to take a calcium channel blocker. I told him  is out of the office so I could not ask . They asked me to ask another provider. Current Outpatient Medications on File Prior to Visit   Medication Sig Dispense Refill    amLODIPine (NORVASC) 5 MG tablet Take 1 tablet by mouth daily 30 tablet 11    atorvastatin (LIPITOR) 80 MG tablet TAKE 1 TABLET BY MOUTH EVERY DAY FOR 90 DAYS      lisinopril (PRINIVIL;ZESTRIL) 30 MG tablet Take 1 tablet by mouth daily 90 tablet 3    aspirin 81 MG chewable tablet Take 1 tablet by mouth daily      nitroGLYCERIN (NITROSTAT) 0.4 MG SL tablet Place 1 tablet under the tongue       No current facility-administered medications on file prior to visit.

## 2023-07-25 NOTE — TELEPHONE ENCOUNTER
[place], [place] 7/24/2023 8:37 AM EDT      ----- Message -----  From: Shana Boas  Sent: 7/24/2023 8:36 AM EDT  To: [facility] Clinical Staff  Subject: Heart cath     Hi can I schedule my heart cath for Friday?  Ty

## 2023-07-25 NOTE — TELEPHONE ENCOUNTER
Pt.wife notified of MD response and v/u. Med escribed as below. Note sent to cath lab to schedule cardiac cath. Note routed to [facility] to address pt.wife concerns.   Requested Prescriptions     Signed Prescriptions Disp Refills    amLODIPine (NORVASC) 5 MG tablet 30 tablet 11     Sig: Take 1 tablet by mouth daily     Authorizing Provider: Gilda Zendejas     Ordering User: IKE VERONICA

## 2023-07-25 NOTE — TELEPHONE ENCOUNTER
K already 4.9, eplerenone will raise K more. Maybe they need to come into the office, can someone see them?   Thanks

## 2023-07-25 NOTE — TELEPHONE ENCOUNTER
[place], [place] 7/25/2023 8:15 AM EDT      ----- Message -----  From: Albertina Srinivasan  Sent: 7/25/2023 8:09 AM EDT  To: [facility] Clinical [facility]  Subject: Pulse     Dr. Luisa Gooden. Micks pulse has been 47, 52,53,69(yesterday) 58today. I held his metroprol. .do you want him still on it or on something else? BP. 121/51  138/51  145/63   173/56  182/72.  Anxious this am. I think it's up now because he broke something, frustration

## 2023-07-25 NOTE — TELEPHONE ENCOUNTER
Patients wife called stating her  canceled his heart cath due to a leg injury and ER visit the night before.  Patients wife states he needs to reschedule his cath as quickly as possible due to the following issues:    Low ejection fraction 30%  Bradycardic  Dr Fito Bearden has prescribed metoprolol that is lowering his HR even further  Has called and sent several messages via [employer] to Dr Frederick Dowell

## 2023-07-26 RX ORDER — TRAZODONE HYDROCHLORIDE 100 MG/1
TABLET ORAL
COMMUNITY
Start: 2020-10-27

## 2023-07-26 RX ORDER — CLONAZEPAM 1 MG/1
1 TABLET ORAL 2 TIMES DAILY PRN
COMMUNITY

## 2023-07-26 RX ORDER — FUROSEMIDE 40 MG/1
TABLET ORAL
COMMUNITY
Start: 2023-07-06

## 2023-07-26 RX ORDER — METOPROLOL SUCCINATE 100 MG/1
TABLET, EXTENDED RELEASE ORAL
COMMUNITY
Start: 2020-10-16

## 2023-07-26 NOTE — TELEPHONE ENCOUNTER
Late entry    Significant time spent on phone with pt's wife 7/25/23. Wife is frustrated at response times to [employer]. Gently reminded wife that it may take up to 48 hours for responses. Encouraged wife to place a call for triage needs or seek urgent/emergent evaluation from the Emergency Dept if pt unstable. Pt's wife also verbalized frustration with medication changes. She would like to discuss with Dr Juvenal Rodriguez. Dr Juvenal Rodriguez is out of the office and another cardiologist is reviewing pt's chart and will make recommendation. Ms Juan R Wiseman will call back for further needs or concerns.

## 2023-07-27 ENCOUNTER — ANESTHESIA EVENT (OUTPATIENT)
Dept: SURGERY | Age: 73
End: 2023-07-27
Payer: MEDICARE

## 2023-07-28 ENCOUNTER — TELEPHONE (OUTPATIENT)
Dept: ORTHOPEDIC SURGERY | Age: 73
End: 2023-07-28

## 2023-07-28 ENCOUNTER — CLINICAL DOCUMENTATION (OUTPATIENT)
Dept: ORTHOPEDIC SURGERY | Age: 73
End: 2023-07-28

## 2023-07-28 DIAGNOSIS — S99.911A INJURY OF RIGHT ANKLE, INITIAL ENCOUNTER: Primary | ICD-10-CM

## 2023-07-28 RX ORDER — ACETAMINOPHEN AND CODEINE PHOSPHATE 300; 30 MG/1; MG/1
1 TABLET ORAL EVERY 4 HOURS PRN
Qty: 30 TABLET | Refills: 0 | Status: SHIPPED | OUTPATIENT
Start: 2023-07-28 | End: 2023-08-02

## 2023-07-28 RX ORDER — OXYCODONE HYDROCHLORIDE AND ACETAMINOPHEN 5; 325 MG/1; MG/1
1 TABLET ORAL EVERY 6 HOURS PRN
Qty: 7 TABLET | Refills: 0 | Status: SHIPPED | OUTPATIENT
Start: 2023-07-28 | End: 2023-08-04

## 2023-07-28 NOTE — TELEPHONE ENCOUNTER
He is needing a refill on Percocet. His surgery is next Wed. He only has two pills left. He uses [facility] in [place] on [address].

## 2023-07-28 NOTE — PROGRESS NOTES
Patient called in and we sent over Tylenol 3 with codiene he states he has been taking Percocet I explained Percocet has codiene in the medication. Pharmacy said that they asked him and he told them that he was able to tolerate medication. Advised patient to go to the ER if pain is a 9/10. Patient become very aggiated and stated that he wants a refill on his percocet and hung up the phone.

## 2023-07-28 NOTE — TELEPHONE ENCOUNTER
They called this morning for a refill and Tylenol with codeine was sent. He is allergic to codeine which is why he was on Percocet. She needs to get this before the weekend.

## 2023-07-28 NOTE — TELEPHONE ENCOUNTER
I called the patient and spoke with him on the phone. At rest he has no pain. He states he is the as when I saw him in the office. He is able to wiggle is toes with minimal to no pain. However when he attempts to walk he has intense calf pain. He has a knee scooter but has trouble balancing on it and has fallen. This has caused a lot of pain. All of his pain is when he attempts to walk to the bathroom, when he attempts to put pressure on his leg. I specifically discussed compartment syndrome with him. He states he is able to rest and sleep. He can wiggle and move his toes without issues. And can move his ankle. He states he is the same as last week, but he has pain with pressure and ambulation. This is why he is requesting pain medication. I explained if his pain increases, if he has loss of color, weak pulse or increasing parasthesia or his leg feels cold he needs to present to the ER ASAP. He states the tylenol #3 I have given him caused convulsions in the past that required him to go the ER in the 1980's. Therefore he does not want to take them. Therefore  I will call him in 7 percocet tablets to get him through the weekend, but if he requries more than that then I have concern and he needs to present to the ER.

## 2023-07-28 NOTE — TELEPHONE ENCOUNTER
Detail Level: Detailed Sent for sign off on tylenol 3 Add 36235 Cpt? (Important Note: In 2017 The Use Of 63422 Is Being Tracked By Cms To Determine Future Global Period Reimbursement For Global Periods): no

## 2023-08-01 DIAGNOSIS — S99.911A INJURY OF RIGHT ANKLE, INITIAL ENCOUNTER: Primary | ICD-10-CM

## 2023-08-01 RX ORDER — ONDANSETRON 4 MG/1
4 TABLET, FILM COATED ORAL DAILY PRN
Qty: 30 TABLET | Refills: 0 | Status: SHIPPED | OUTPATIENT
Start: 2023-08-01

## 2023-08-01 RX ORDER — OXYCODONE HYDROCHLORIDE AND ACETAMINOPHEN 5; 325 MG/1; MG/1
1 TABLET ORAL EVERY 6 HOURS PRN
Qty: 30 TABLET | Refills: 0 | Status: SHIPPED | OUTPATIENT
Start: 2023-08-01 | End: 2023-08-15

## 2023-08-01 RX ORDER — DOCUSATE SODIUM 100 MG/1
100 CAPSULE, LIQUID FILLED ORAL DAILY PRN
Qty: 30 CAPSULE | Refills: 0 | Status: SHIPPED | OUTPATIENT
Start: 2023-08-01

## 2023-08-01 RX ORDER — ASPIRIN 81 MG/1
81 TABLET ORAL 2 TIMES DAILY
Qty: 42 TABLET | Refills: 0 | Status: SHIPPED | OUTPATIENT
Start: 2023-08-01 | End: 2023-08-22

## 2023-08-01 NOTE — PERIOP NOTE
Preop department called to notify patient of arrival time for scheduled procedure. Instructions given to   - Arrive at [address]. - Remain NPO after midnight, unless otherwise indicated, including gum, mints, and ice chips. - Have a responsible adult to drive patient to the hospital, stay during surgery, and patient will need supervision 24 hours after anesthesia. - Use antibacterial soap in shower the night before surgery and on the morning of surgery.        Was patient contacted: wife  Voicemail left:   Numbers contacted: [phone]   Arrival time: 0530

## 2023-08-02 ENCOUNTER — ANESTHESIA (OUTPATIENT)
Dept: SURGERY | Age: 73
End: 2023-08-02
Payer: MEDICARE

## 2023-08-02 ENCOUNTER — HOSPITAL ENCOUNTER (OUTPATIENT)
Age: 73
Setting detail: OUTPATIENT SURGERY
Discharge: HOME | End: 2023-08-02
Attending: ORTHOPAEDIC SURGERY | Admitting: ORTHOPAEDIC SURGERY
Payer: MEDICARE

## 2023-08-02 VITALS
BODY MASS INDEX: 25.77 KG/M2 | DIASTOLIC BLOOD PRESSURE: 58 MMHG | HEART RATE: 39 BPM | SYSTOLIC BLOOD PRESSURE: 123 MMHG | TEMPERATURE: 97.7 F | WEIGHT: 174 LBS | HEIGHT: 69 IN | OXYGEN SATURATION: 97 % | RESPIRATION RATE: 31 BRPM

## 2023-08-02 DIAGNOSIS — T14.8XXA HEMATOMA: ICD-10-CM

## 2023-08-02 DIAGNOSIS — M79.661 RIGHT CALF PAIN: ICD-10-CM

## 2023-08-02 LAB
HGB BLD-MCNC: 11.9 G/DL (ref 13.6–17.2)
POTASSIUM BLD-SCNC: 4.5 MMOL/L (ref 3.5–5.1)

## 2023-08-02 PROCEDURE — 6360000002 HC RX W HCPCS: Performed by: ORTHOPAEDIC SURGERY

## 2023-08-02 PROCEDURE — 7100000010 HC PHASE II RECOVERY - FIRST 15 MIN: Performed by: ORTHOPAEDIC SURGERY

## 2023-08-02 PROCEDURE — 88304 TISSUE EXAM BY PATHOLOGIST: CPT

## 2023-08-02 PROCEDURE — 6360000002 HC RX W HCPCS: Performed by: STUDENT IN AN ORGANIZED HEALTH CARE EDUCATION/TRAINING PROGRAM

## 2023-08-02 PROCEDURE — 7100000001 HC PACU RECOVERY - ADDTL 15 MIN: Performed by: ORTHOPAEDIC SURGERY

## 2023-08-02 PROCEDURE — 3600000012 HC SURGERY LEVEL 2 ADDTL 15MIN: Performed by: ORTHOPAEDIC SURGERY

## 2023-08-02 PROCEDURE — 84132 ASSAY OF SERUM POTASSIUM: CPT

## 2023-08-02 PROCEDURE — 6360000002 HC RX W HCPCS

## 2023-08-02 PROCEDURE — 87070 CULTURE OTHR SPECIMN AEROBIC: CPT

## 2023-08-02 PROCEDURE — 3700000000 HC ANESTHESIA ATTENDED CARE: Performed by: ORTHOPAEDIC SURGERY

## 2023-08-02 PROCEDURE — 3700000001 HC ADD 15 MINUTES (ANESTHESIA): Performed by: ORTHOPAEDIC SURGERY

## 2023-08-02 PROCEDURE — 64447 NJX AA&/STRD FEMORAL NRV IMG: CPT | Performed by: STUDENT IN AN ORGANIZED HEALTH CARE EDUCATION/TRAINING PROGRAM

## 2023-08-02 PROCEDURE — 27603 DRAIN LOWER LEG LESION: CPT | Performed by: ORTHOPAEDIC SURGERY

## 2023-08-02 PROCEDURE — 3600000002 HC SURGERY LEVEL 2 BASE: Performed by: ORTHOPAEDIC SURGERY

## 2023-08-02 PROCEDURE — 2500000003 HC RX 250 WO HCPCS

## 2023-08-02 PROCEDURE — 2709999900 HC NON-CHARGEABLE SUPPLY: Performed by: ORTHOPAEDIC SURGERY

## 2023-08-02 PROCEDURE — 85018 HEMOGLOBIN: CPT

## 2023-08-02 PROCEDURE — 7100000000 HC PACU RECOVERY - FIRST 15 MIN: Performed by: ORTHOPAEDIC SURGERY

## 2023-08-02 PROCEDURE — 2580000003 HC RX 258: Performed by: STUDENT IN AN ORGANIZED HEALTH CARE EDUCATION/TRAINING PROGRAM

## 2023-08-02 PROCEDURE — 64445 NJX AA&/STRD SCIATIC NRV IMG: CPT | Performed by: STUDENT IN AN ORGANIZED HEALTH CARE EDUCATION/TRAINING PROGRAM

## 2023-08-02 PROCEDURE — 87205 SMEAR GRAM STAIN: CPT

## 2023-08-02 PROCEDURE — 2500000003 HC RX 250 WO HCPCS: Performed by: STUDENT IN AN ORGANIZED HEALTH CARE EDUCATION/TRAINING PROGRAM

## 2023-08-02 PROCEDURE — 7100000011 HC PHASE II RECOVERY - ADDTL 15 MIN: Performed by: ORTHOPAEDIC SURGERY

## 2023-08-02 RX ORDER — SODIUM CHLORIDE 9 MG/ML
INJECTION, SOLUTION INTRAVENOUS PRN
Status: DISCONTINUED | OUTPATIENT
Start: 2023-08-02 | End: 2023-08-02 | Stop reason: HOSPADM

## 2023-08-02 RX ORDER — SODIUM CHLORIDE 0.9 % (FLUSH) 0.9 %
5-40 SYRINGE (ML) INJECTION PRN
Status: DISCONTINUED | OUTPATIENT
Start: 2023-08-02 | End: 2023-08-02 | Stop reason: HOSPADM

## 2023-08-02 RX ORDER — ONDANSETRON 2 MG/ML
4 INJECTION INTRAMUSCULAR; INTRAVENOUS
Status: DISCONTINUED | OUTPATIENT
Start: 2023-08-02 | End: 2023-08-02 | Stop reason: HOSPADM

## 2023-08-02 RX ORDER — SODIUM CHLORIDE 0.9 % (FLUSH) 0.9 %
5-40 SYRINGE (ML) INJECTION EVERY 12 HOURS SCHEDULED
Status: DISCONTINUED | OUTPATIENT
Start: 2023-08-02 | End: 2023-08-02 | Stop reason: HOSPADM

## 2023-08-02 RX ORDER — FENTANYL CITRATE 50 UG/ML
100 INJECTION, SOLUTION INTRAMUSCULAR; INTRAVENOUS
Status: DISCONTINUED | OUTPATIENT
Start: 2023-08-02 | End: 2023-08-02 | Stop reason: HOSPADM

## 2023-08-02 RX ORDER — DEXMEDETOMIDINE HYDROCHLORIDE 100 UG/ML
INJECTION, SOLUTION INTRAVENOUS PRN
Status: DISCONTINUED | OUTPATIENT
Start: 2023-08-02 | End: 2023-08-02 | Stop reason: SDUPTHER

## 2023-08-02 RX ORDER — MIDAZOLAM HYDROCHLORIDE 1 MG/ML
INJECTION INTRAMUSCULAR; INTRAVENOUS PRN
Status: DISCONTINUED | OUTPATIENT
Start: 2023-08-02 | End: 2023-08-02 | Stop reason: SDUPTHER

## 2023-08-02 RX ORDER — PROCHLORPERAZINE EDISYLATE 5 MG/ML
5 INJECTION INTRAMUSCULAR; INTRAVENOUS
Status: DISCONTINUED | OUTPATIENT
Start: 2023-08-02 | End: 2023-08-02 | Stop reason: HOSPADM

## 2023-08-02 RX ORDER — VANCOMYCIN HYDROCHLORIDE 1 G/20ML
INJECTION, POWDER, LYOPHILIZED, FOR SOLUTION INTRAVENOUS PRN
Status: DISCONTINUED | OUTPATIENT
Start: 2023-08-02 | End: 2023-08-02 | Stop reason: ALTCHOICE

## 2023-08-02 RX ORDER — LIDOCAINE HYDROCHLORIDE 10 MG/ML
1 INJECTION, SOLUTION INFILTRATION; PERINEURAL
Status: DISCONTINUED | OUTPATIENT
Start: 2023-08-02 | End: 2023-08-02 | Stop reason: HOSPADM

## 2023-08-02 RX ORDER — MIDAZOLAM HYDROCHLORIDE 2 MG/2ML
2 INJECTION, SOLUTION INTRAMUSCULAR; INTRAVENOUS
Status: COMPLETED | OUTPATIENT
Start: 2023-08-02 | End: 2023-08-02

## 2023-08-02 RX ORDER — BUPIVACAINE HYDROCHLORIDE AND EPINEPHRINE 5; 5 MG/ML; UG/ML
INJECTION, SOLUTION EPIDURAL; INTRACAUDAL; PERINEURAL
Status: DISCONTINUED | OUTPATIENT
Start: 2023-08-02 | End: 2023-08-02 | Stop reason: SDUPTHER

## 2023-08-02 RX ORDER — SODIUM CHLORIDE, SODIUM LACTATE, POTASSIUM CHLORIDE, CALCIUM CHLORIDE 600; 310; 30; 20 MG/100ML; MG/100ML; MG/100ML; MG/100ML
INJECTION, SOLUTION INTRAVENOUS CONTINUOUS
Status: DISCONTINUED | OUTPATIENT
Start: 2023-08-02 | End: 2023-08-02 | Stop reason: HOSPADM

## 2023-08-02 RX ADMIN — DEXMEDETOMIDINE 8 MCG: 100 INJECTION, SOLUTION, CONCENTRATE INTRAVENOUS at 07:09

## 2023-08-02 RX ADMIN — DEXMEDETOMIDINE 8 MCG: 100 INJECTION, SOLUTION, CONCENTRATE INTRAVENOUS at 07:14

## 2023-08-02 RX ADMIN — DEXMEDETOMIDINE 4 MCG: 100 INJECTION, SOLUTION, CONCENTRATE INTRAVENOUS at 07:20

## 2023-08-02 RX ADMIN — MIDAZOLAM 2 MG: 1 INJECTION INTRAMUSCULAR; INTRAVENOUS at 06:42

## 2023-08-02 RX ADMIN — MEPIVACAINE HYDROCHLORIDE 15 ML: 15 INJECTION, SOLUTION EPIDURAL; INFILTRATION at 06:42

## 2023-08-02 RX ADMIN — BUPIVACAINE HYDROCHLORIDE AND EPINEPHRINE 10 ML: 5; 5 INJECTION, SOLUTION EPIDURAL; INTRACAUDAL; PERINEURAL at 06:46

## 2023-08-02 RX ADMIN — MIDAZOLAM 2 MG: 1 INJECTION INTRAMUSCULAR; INTRAVENOUS at 07:09

## 2023-08-02 RX ADMIN — SODIUM CHLORIDE, POTASSIUM CHLORIDE, SODIUM LACTATE AND CALCIUM CHLORIDE: 600; 310; 30; 20 INJECTION, SOLUTION INTRAVENOUS at 06:15

## 2023-08-02 RX ADMIN — Medication 2000 MG: at 07:07

## 2023-08-02 RX ADMIN — BUPIVACAINE HYDROCHLORIDE AND EPINEPHRINE BITARTRATE 15 ML: 5; .005 INJECTION, SOLUTION EPIDURAL; INTRACAUDAL; PERINEURAL at 06:42

## 2023-08-02 NOTE — DISCHARGE INSTRUCTIONS
INSTRUCTIONS FOLLOWING SURGERY      ACTIVITY  Elevate foot. No Ice    No weight bearing. Use crutches or knee walker until seen in follow up appointment    Blood clot prevention:  As instructed by Dr Ethan Granger: Take 81mg aspirin twice daily if ok with your medical doctor and you have no GI Ulcer. Get up and out of bed frequently. While in bed move the legs as much as possible. DRESSING CARE Keep dry and in place until follow up appointment. Cover with cast bag or plastic bag when showering. Let the office know if dressing gets saturated with water. Don't put anything into the splint to relieve itching etc.     CALL YOUR DOCTOR IF YOU HAVE  Excessive bleeding that does not stop after holding mild pressure over the area. Temperature of 101 degrees or above. Redness, excessive swelling or bruising, and/or green or yellow, smelly discharge from incision. Loss of sensation - cold, white or blue toes. DIET  Day of Surgery: Clear liquids until no nausea or vomiting; then light, bland diet (Baked chicken, plain rice, grits, scrambled egg, toast). Nothing Greasy, fried or spicy today  Advance to regular diet on second day, unless your doctor orders otherwise. PAIN  Take pain medications as directed by your doctor. Call your doctor if pain is NOT relieved by medication. MEDICATION INTERACTION:  During your procedure you potentially received a medication or medications which may reduce the effectiveness of oral contraceptives. Please consider other forms of contraception for 1 month following your procedure if you are currently using oral contraceptives as your primary form of birth control.  In addition to this, we recommend continuing your oral contraceptive as prescribed, unless otherwise instructed by your physician, during this time    After general anesthesia or intravenous sedation, for 24 hours or while taking prescription Narcotics:  Limit your activities  A responsible adult needs to be with you for made of gauze pads held on with tape. Your doctor will tell you how often to change it. Wash your hands with soap and water. Take off the dressing from around the drain. Clean the drain site and the skin around it with soap and water. Use gauze or a cotton swab. When the site is dry, put on a new dressing. The way your dressing is put on depends on what kind of drain you have. You will get instructions for your type of drain. Wash your hands again with soap and water. Your doctor may ask you to keep track of your dressing changes. Write down the time of day and the amount and color of the fluid on the dressing. How do you help prevent clogs in your surgical drain? Squeezing or \"milking\" the tube of your surgical drain can help prevent clogs so that it drains correctly. Your doctor will tell you when you need to do this. In general, you do this when: You see a clot in the tube that prevents fluid from draining. The clot may look like a dark, stringy lining. You see fluid leaking around the tube where it goes into the skin. Follow these steps for milking the tube. Use one hand to hold and pinch the tube where it leaves the skin. With the thumb and first finger of your other hand, pinch the tube just below where you're holding it. Slowly and firmly push your thumb and first finger down the tubing toward the end of the tube. Repeat this as many times as needed to move the clot. If you have a Eugene-Agee (RAIZA) drain, the clot should move down the tube and into the bulb. If you have a Penrose drain, the clot should move into the dressing. When should you call for help? Call your doctor now or seek immediate medical care if:    You have signs of infection, such as: Increased pain, swelling, warmth, or redness around the area. Red streaks leading from the area. Pus draining from the area. A fever. You see a sudden change in the color or smell of the drainage.      The tube is coming loose

## 2023-08-02 NOTE — ANESTHESIA PROCEDURE NOTES
Peripheral Block    Patient location during procedure: pre-op  Reason for block: post-op pain management and at surgeon's request  Start time: 8/2/2023 6:46 AM  End time: 8/2/2023 6:50 AM  Staffing  Performed: anesthesiologist   Anesthesiologist: Sundeep Zimmerman MD  Preanesthetic Checklist  Completed: patient identified, site marked, risks and benefits discussed, equipment checked, pre-op evaluation, timeout performed, anesthesia consent given, oxygen available and monitors applied/VS acknowledged  Peripheral Block   Prep: ChloraPrep  Provider prep: mask and sterile gloves  Patient monitoring: cardiac monitor, continuous pulse ox, oxygen, IV access, frequent blood pressure checks and responsive to questions  Block type: Femoral  Adductor canal  Laterality: right  Injection technique: single-shot  Guidance: ultrasound guided  Local infiltration: lidocaine  Infiltration strength: 1 %  Local infiltration: lidocaine  Dose: 3 mL    Needle   Needle type: insulated echogenic nerve stimulator needle   Needle gauge: 20 G  Needle localization: ultrasound guidance (minimal motor response at >0.4 mA)  Needle length: 10 cm  Assessment   Injection assessment: negative aspiration for heme, no paresthesia on injection, local visualized surrounding nerve on ultrasound and no intravascular symptoms  Paresthesia pain: none  Slow fractionated injection: yes  Hemodynamics: stable  Real-time US image taken/store: yes  Outcomes: patient tolerated procedure well and uncomplicated    Additional Notes  Risks/benefits/alternatives discussed including damage to nerve or muscle. 3 cc 1% lidocaine local injected at needle insertion site. Needle inserted and placed in close proximity to the nerve under real time ultrasound guidance. Ultrasound was used to visualize the spread of local anesthetic in close proximity to the nerve being blocked. The nerve appeared anatomically normal and there were no abnormal findings.   Ultrasound images printed

## 2023-08-02 NOTE — INTERVAL H&P NOTE
Update History & Physical    The Patient's History and Physical was reviewed   I discussed the surgery and patients medical condition with the patient. The chart was reviewed with the patient and I examined the patient. There was no change. The surgical site was confirmed by the patient and me. Cardiac clearance has been obtained    CV: RRR  RESP: CTAB    Plan:  The risk, benefits, expected outcome, and alternative to the recommended procedure have been discussed with the patient. Patient understands and wants to proceed with the procedure.     Electronically signed by Mee Clark MD on 08/02/23 6:41 AM

## 2023-08-02 NOTE — ANESTHESIA PROCEDURE NOTES
Peripheral Block    Patient location during procedure: pre-op  Reason for block: post-op pain management and at surgeon's request  Start time: 8/2/2023 6:42 AM  End time: 8/2/2023 6:46 AM  Staffing  Performed: anesthesiologist   Anesthesiologist: Jerzy Mon MD  Preanesthetic Checklist  Completed: patient identified, site marked, risks and benefits discussed, equipment checked, pre-op evaluation, timeout performed, anesthesia consent given, oxygen available and monitors applied/VS acknowledged  Peripheral Block   Patient position: supine  Prep: ChloraPrep  Provider prep: mask and sterile gloves  Patient monitoring: cardiac monitor, continuous pulse ox, oxygen, IV access, frequent blood pressure checks and responsive to questions  Block type: Sciatic  Popliteal  Laterality: right  Injection technique: single-shot  Guidance: ultrasound guided  Local infiltration: lidocaine  Infiltration strength: 1 %  Local infiltration: lidocaine  Dose: 3 mL    Needle   Needle type: insulated echogenic nerve stimulator needle   Needle gauge: 20 G  Needle localization: ultrasound guidance (minimal motor response at >0.4 mA)  Needle length: 10 cm  Assessment   Injection assessment: negative aspiration for heme, no paresthesia on injection, local visualized surrounding nerve on ultrasound and no intravascular symptoms  Paresthesia pain: none  Slow fractionated injection: yes  Hemodynamics: stable  Real-time US image taken/store: yes  Outcomes: patient tolerated procedure well and uncomplicated    Additional Notes  Risks/benefits/alternatives discussed including damage to nerve or muscle. 3 cc 1% lidocaine local injected at needle insertion site. Needle inserted and placed in close proximity to the nerve under real time ultrasound guidance. Ultrasound was used to visualize the spread of local anesthetic in close proximity to the nerve being blocked. The nerve appeared anatomically normal and there were no abnormal findings.

## 2023-08-02 NOTE — ANESTHESIA POSTPROCEDURE EVALUATION
Department of Anesthesiology  Postprocedure Note    Patient: Conception Adjutant  MRN: [id number]  YOB: 1950  Date of evaluation: 8/2/2023      Procedure Summary     Date: 08/02/23 Room / Location: [facility] OP OR 01 / [facility]    Anesthesia Start: 0515 Anesthesia Stop: 3169    Procedure: RIGHT CALF  DEBRIDEMENT INCISION AND DRAINAGE (Right: Leg Lower) Diagnosis:       Hematoma      Right calf pain      (Hematoma [T14. 8XXA])      (Right calf pain [M79.661])    Surgeons: Eber Blount MD Responsible Provider: Gretel Briceno MD    Anesthesia Type: TIVA ASA Status: 3          Anesthesia Type: No value filed.     Ryan Phase I: Ryan Score: 10    Ryan Phase II: Ryan Score: 10      Anesthesia Post Evaluation    Patient location during evaluation: PACU  Patient participation: complete - patient participated  Level of consciousness: awake  Airway patency: patent  Nausea & Vomiting: no nausea and no vomiting  Complications: no  Cardiovascular status: blood pressure returned to baseline  Respiratory status: acceptable  Hydration status: euvolemic  Pain management: adequate

## 2023-08-02 NOTE — OP NOTE
Operative Note    Patient:Dionisio Roper  MRN: [id number]    Date Of Surgery: 8/2/2023    Surgeon: Maggi Zelaya MD    Assistant Surgeon: None    Procedure Performed:   right  Evacuation calf hematoma    Pre Op Diagnosis:  Right medial head gastrocnemius tear with large hematoma    Post Op Diagnosis:   same    Implants:   * No implants in log *    Anesthesia:  Regional popliteal saphenous block    Blood Loss:  10cc    Tourniquet:  Estimated thigh tourniquet at 250 mmHg- 15 minutes    Pre Operative Abx:   Ancef 2g    Specimens/Cultures:  Culture taken  Pathology specimen      Significant Findings:  Tear of the medial head of the gastrocnemius muscle at the muscular region  Large hematoma identified    Pre Operative Course:  Nato Wisdom is a 68 y.o. male who mid June steps: Felt a pop in the right calf. He has significant pain and swelling. The pain did not get much better he went to the emergency room where he was diagnosed with a gastrocnemius muscle tear with hematoma. He continued to have pain and swelling and was felt that the compressive nature of the hematoma was causing a lot of his issues. He was referred to me. Upon my evaluation it was obvious he did not have compartment syndrome but I felt his hematoma was largely compressive and causing pain. The decision was made to evacuate his hematoma. Cardiac clearance was obtained. Operation In Detail:  Patient was evaluated in the preoperative area. The right lower extremity was marked by me. We had a long discussion about the procedure and postoperative protocols. The patient was then brought back to the operating room suite and placed in the operating room table. A timeout was taken to identify the patient, procedure being performed, and laterality. After this the patient was prepped and draped in the normal sterile fashion using a Betadine solution and/or a ChloraPrep solution.   A timeout was then taken to identify the patient his name, date of birth, laterality, and procedure being performed. We also identified allergies and any concerns about the operation. Attention was then placed to the operative extremity. Antibiotics, 2 g IV Ancef was administered    Tourniquet inflated to 250 mmHg at the thigh after the leg was exsanguinated with Esmarch tourniquet. A longitudinal incision was made at the medial aspect of the calf muscle over the medial head of the gastroc. I incised the skin and bluntly spread down to the hematoma at the proximal third of the tibia. The fascia was incised and a large hematoma was seen. Using finger dissection and irrigation we remove the hematoma. Cultures were taken. Pathology specimen of the adjacent soft tissue was taken to make sure this was not anything more than hematoma. We copiously irrigated out the wound. I continued to evacuate hematoma with my finger to make sure there is no signs of residual hematoma. After the cavity was cleaned out completely we inspected the wound. Using a Ray-Krista, finger and irrigation were removed any residual hematoma. I then evaluated the wound. The tourniquet was let down. There is no signs of active bleeding. The tourniquet was not. I examined to make sure there was no bleeding. Using a Bovie I did cauterize some small bleeding vessels but there is no signs of pumping arterial bleeds. We then copiously irrigated out the wound again and examined again for any bleeding. I do not feel there is any bleeding. Examination of the muscle showed an obvious tear of the gastrocnemius medial head however there is no signs of a large tissue mass or anything that appeared to be other than torn muscle and hematoma. However I biopsy of the adjacent muscle was taken. 1 g of vancomycin powder was then placed into the wound. A Penrose drain was then placed into the wound bed and out the proximal end of the incision. The wound was then closed in a layered fashion.

## 2023-08-02 NOTE — ANESTHESIA PRE PROCEDURE
Vascular: Other Findings:           Anesthesia Plan      TIVA     ASA 3     (The patient prefers avoidance of propofol, relates that to prior cardiac event. Plan for TIVA)  Induction: intravenous. MIPS: Postoperative opioids intended. Anesthetic plan and risks discussed with patient. Plan discussed with CRNA.           Post-op pain plan if not by surgeon: single peripheral nerve block            Brigette Ferreira MD   8/2/2023

## 2023-08-02 NOTE — PERIOP NOTE
PACU DISCHARGE NOTE    Pt and wife verbalized understanding of discharge inst. Pt tolerated po fluids well. Ok to discharge per Dr June Brewer with ocassional heart rate of 39Vital signs stable, pain well controlled, alert and oriented times three or at baseline, follow up per surgeon, no anesthetic complications.

## 2023-08-04 ENCOUNTER — TELEPHONE (OUTPATIENT)
Dept: ORTHOPEDIC SURGERY | Age: 73
End: 2023-08-04

## 2023-08-04 LAB
BACTERIA SPEC CULT: NORMAL
GRAM STN SPEC: NORMAL
GRAM STN SPEC: NORMAL
SERVICE CMNT-IMP: NORMAL

## 2023-08-04 NOTE — TELEPHONE ENCOUNTER
His wife is calling because he had surgery on Wednesday on his calf and she was told to remove the penrose drain today. She states it is very bloody and he needs to come in to have his bandage redone. Please call.

## 2023-08-09 ENCOUNTER — HOSPITAL ENCOUNTER (EMERGENCY)
Age: 73
Discharge: HOME | End: 2023-08-09
Attending: EMERGENCY MEDICINE
Payer: MEDICARE

## 2023-08-09 ENCOUNTER — APPOINTMENT (OUTPATIENT)
Dept: GENERAL RADIOLOGY | Age: 73
End: 2023-08-09
Payer: MEDICARE

## 2023-08-09 VITALS
WEIGHT: 174 LBS | RESPIRATION RATE: 16 BRPM | SYSTOLIC BLOOD PRESSURE: 159 MMHG | DIASTOLIC BLOOD PRESSURE: 65 MMHG | HEIGHT: 69 IN | TEMPERATURE: 98.3 F | OXYGEN SATURATION: 99 % | HEART RATE: 60 BPM | BODY MASS INDEX: 25.77 KG/M2

## 2023-08-09 DIAGNOSIS — M25.512 ACUTE PAIN OF LEFT SHOULDER: ICD-10-CM

## 2023-08-09 DIAGNOSIS — S49.92XA INJURY OF LEFT SHOULDER, INITIAL ENCOUNTER: Primary | ICD-10-CM

## 2023-08-09 PROCEDURE — 6370000000 HC RX 637 (ALT 250 FOR IP)

## 2023-08-09 PROCEDURE — 73030 X-RAY EXAM OF SHOULDER: CPT

## 2023-08-09 PROCEDURE — 99283 EMERGENCY DEPT VISIT LOW MDM: CPT

## 2023-08-09 RX ORDER — OXYCODONE AND ACETAMINOPHEN 7.5; 325 MG/1; MG/1
1 TABLET ORAL
Status: COMPLETED | OUTPATIENT
Start: 2023-08-09 | End: 2023-08-09

## 2023-08-09 RX ORDER — LIDOCAINE 4 G/G
1 PATCH TOPICAL DAILY
Qty: 30 PATCH | Refills: 0 | Status: SHIPPED | OUTPATIENT
Start: 2023-08-09 | End: 2023-09-08

## 2023-08-09 RX ORDER — HYDROXYZINE HYDROCHLORIDE 25 MG/1
25 TABLET, FILM COATED ORAL EVERY 8 HOURS PRN
Qty: 30 TABLET | Refills: 0 | Status: SHIPPED | OUTPATIENT
Start: 2023-08-09 | End: 2023-08-19

## 2023-08-09 RX ORDER — OXYCODONE AND ACETAMINOPHEN 7.5; 325 MG/1; MG/1
1 TABLET ORAL EVERY 6 HOURS PRN
Qty: 12 TABLET | Refills: 0 | Status: SHIPPED | OUTPATIENT
Start: 2023-08-09 | End: 2023-08-09 | Stop reason: ALTCHOICE

## 2023-08-09 RX ORDER — OXYCODONE AND ACETAMINOPHEN 7.5; 325 MG/1; MG/1
1 TABLET ORAL EVERY 6 HOURS PRN
Qty: 12 TABLET | Refills: 0 | Status: SHIPPED | OUTPATIENT
Start: 2023-08-09 | End: 2023-08-12

## 2023-08-09 RX ADMIN — OXYCODONE AND ACETAMINOPHEN 1 TABLET: 7.5; 325 TABLET ORAL at 18:35

## 2023-08-09 ASSESSMENT — PAIN SCALES - GENERAL
PAINLEVEL_OUTOF10: 4
PAINLEVEL_OUTOF10: 6
PAINLEVEL_OUTOF10: 4

## 2023-08-09 ASSESSMENT — PAIN DESCRIPTION - LOCATION
LOCATION: SHOULDER

## 2023-08-09 ASSESSMENT — LIFESTYLE VARIABLES
HOW MANY STANDARD DRINKS CONTAINING ALCOHOL DO YOU HAVE ON A TYPICAL DAY: PATIENT DOES NOT DRINK
HOW OFTEN DO YOU HAVE A DRINK CONTAINING ALCOHOL: NEVER

## 2023-08-09 ASSESSMENT — PAIN DESCRIPTION - DESCRIPTORS: DESCRIPTORS: ACHING

## 2023-08-09 ASSESSMENT — PAIN DESCRIPTION - ORIENTATION
ORIENTATION: LEFT

## 2023-08-09 ASSESSMENT — PAIN - FUNCTIONAL ASSESSMENT: PAIN_FUNCTIONAL_ASSESSMENT: 0-10

## 2023-08-09 NOTE — ED TRIAGE NOTES
Patient ambulatory to triage with CO fall 1 week ago and left shoulder and bicep pain. Reports relief with tylenol and ibuprofen.  Increased pain with certain movement and with laying flat
no

## 2023-08-09 NOTE — DISCHARGE INSTRUCTIONS
Please continue taking the Percocet at home. Please apply the lidocaine patches as well. Please follow-up with [place] [facility] for further evaluation. If you begin to experience any new or worsening symptoms return to the ED.

## 2023-08-10 NOTE — ED PROVIDER NOTES
Emergency Department Provider Note                   PCP:                Enoch Alejo MD               Age: 68 y.o. Sex: male     DISPOSITION Decision To Discharge 08/09/2023 07:16:31 PM       ICD-10-CM    1. Injury of left shoulder, initial encounter  S49. [facility]     oxyCODONE-acetaminophen (PERCOCET) 7.5-325 MG per tablet     DISCONTINUED: oxyCODONE-acetaminophen (PERCOCET) 7.5-325 MG per tablet      2. Acute pain of left shoulder  M25.512 oxyCODONE-acetaminophen (PERCOCET) 7.5-325 MG per tablet          MEDICAL DECISION MAKING  Complexity of Problems Addressed:  Complexity of Problem: 1 acute, uncomplicated illness or injury. Data Reviewed and Analyzed:  Category 1:   I reviewed external records: provider visit note from PCP. I reviewed external records: provider visit note from outside specialist.  I ordered each unique test.  I reviewed the results of each unique test.      Category 2:   I interpreted the X-rays. And agree that there is no evidence of fracture. Category 3: Discussion of management or test interpretation. A 71-year-old male with past medical history of hypertension, CKD, ED, and status post right rotator cuff surgery presents complaining of a left shoulder injury. As stated in HPI, 1 week ago patient was using a knee scooter after his lower leg surgery. He was on the knee scooter when he had a mechanical fall onto his left shoulder. Patient was utilizing the pain control prescribed him from his surgery but has run out and is now experiencing continued pain. States current symptoms feel similar to his previous rotator cuff injury on the right shoulder. On exam, patient is afebrile without tachycardia, hypotension, or any other evidence of hemodynamic instability. He exhibits tenderness to palpation to the anterior aspect of the left shoulder.   Pain with forward flexion and abduction with decreased active

## 2023-08-11 ENCOUNTER — TELEPHONE (OUTPATIENT)
Dept: ORTHOPEDIC SURGERY | Age: 73
End: 2023-08-11

## 2023-08-11 ENCOUNTER — TELEPHONE (OUTPATIENT)
Age: 73
End: 2023-08-11

## 2023-08-11 NOTE — TELEPHONE ENCOUNTER
Regarding: Cath  Contact: [phone]  ----- Message from Dawood Doan, [address] sent at 8/11/2023  2:49 PM EDT -----       ----- Message from [place] to Zbigniew Grimaldo MD sent at 8/11/2023  2:39 PM -----   Dr. Emi Terrazas. I fell a week ago. I was on the knee scooter land d face down shoulders on the handle bars. Hit my head. Thought I would feel better in a few days. Pain 8-9. Went to ER. X-ray no fix but sent me to ortho think I tore rotator cuff. Ortho couldn't see me until 2st.  My pain is a 10 when I wake up. Hard time controlling it during the day. Ortho just called and they can see me Monday 14th at 10. I am canceling my cath for now for the same reasons as my leg. You will probably put me on a blood thinner and I would need to delay my shoulder surgery. With the pain I n ed to get that done first.  Do I agree. I'm seeing a Dr Jair Brooke [facility]. Get that done and I can gave the cath. I'm letting you know what is happening. Thank you.  Gay Rodriguez

## 2023-08-11 NOTE — TELEPHONE ENCOUNTER
Attempted to return patient's call and get him scheduled.  Left a voicemail asking him to return our call at his earliest convenience if he'd like to be seen on Monday at 10am.

## 2023-08-11 NOTE — PROGRESS NOTES
There was some notable pain relief reported by the patient prior to leaving the office today. The patient was counseled not to submerge the site for 24 hours, not to perform strenuous activity for the next five days, and if notes any signs or symptoms consistent with joint infection or allergic reaction to go to a local emergency room. The patient was observed for 15 minutes postprocedure and was allowed to be discharged from clinic in their usual state of health.

## 2023-08-11 NOTE — TELEPHONE ENCOUNTER
Patient called he went over the same information that was discussed earlier. Patient informed that the message has been sent to Dr. Magui Monroe. Again, Dr. Magui Monroe is not in the office today.  Patient voiced understanding//julienneab

## 2023-08-11 NOTE — TELEPHONE ENCOUNTER
Wife and patient called stating that patient fell off his knee scooter onto his left shoulder. Patient states that he is in a lot of pain 10+. Was only given 3 days of pain medication. Cannot get into to see Dr Neli Villeda at [facility] until 8-21-23. Wants Dr. Cherylene Bolognese to call and get patient in earlier. Patient states that he cannot stand the pain and needs more pain medication. Also scheduled to have heart cath on Monday. Having PTSD about his heart cath from previous one. Wife is requesting Dr. Cherylene Bolognese to call [facility] to get patient in earlier. All of this is causing more stress on his heart. Wife informed that Dr. Cherylene Bolognese is not in the office today and patient should call PCP regarding his shoulder pain for pain medication. We do not have any control over another office scheduling, will be glad to let Dr. Cherylene Bolognese know and see if he would make a courtesy call. Cannot guarantee that he would. . wife and patient voiced understanding//julienneab

## 2023-08-11 NOTE — TELEPHONE ENCOUNTER
Patient called back and needs to speak to someone about his his clearer call.  Patient was told this on formation has been forwarded to the doctor

## 2023-08-14 ENCOUNTER — TELEPHONE (OUTPATIENT)
Age: 73
End: 2023-08-14

## 2023-08-14 ENCOUNTER — OFFICE VISIT (OUTPATIENT)
Dept: ORTHOPEDIC SURGERY | Age: 73
End: 2023-08-14

## 2023-08-14 DIAGNOSIS — M25.511 BILATERAL SHOULDER PAIN, UNSPECIFIED CHRONICITY: Primary | ICD-10-CM

## 2023-08-14 DIAGNOSIS — M54.12 CERVICAL RADICULOPATHY: ICD-10-CM

## 2023-08-14 DIAGNOSIS — M25.512 BILATERAL SHOULDER PAIN, UNSPECIFIED CHRONICITY: Primary | ICD-10-CM

## 2023-08-14 DIAGNOSIS — M67.911 TENDINOPATHY OF RIGHT ROTATOR CUFF: ICD-10-CM

## 2023-08-14 DIAGNOSIS — M67.912 TENDINOPATHY OF LEFT ROTATOR CUFF: ICD-10-CM

## 2023-08-14 RX ORDER — METHYLPREDNISOLONE ACETATE 40 MG/ML
40 INJECTION, SUSPENSION INTRA-ARTICULAR; INTRALESIONAL; INTRAMUSCULAR; SOFT TISSUE ONCE
Status: COMPLETED | OUTPATIENT
Start: 2023-08-14 | End: 2023-08-14

## 2023-08-14 RX ADMIN — METHYLPREDNISOLONE ACETATE 40 MG: 40 INJECTION, SUSPENSION INTRA-ARTICULAR; INTRALESIONAL; INTRAMUSCULAR; SOFT TISSUE at 11:15

## 2023-08-14 RX ADMIN — METHYLPREDNISOLONE ACETATE 40 MG: 40 INJECTION, SUSPENSION INTRA-ARTICULAR; INTRALESIONAL; INTRAMUSCULAR; SOFT TISSUE at 11:18

## 2023-08-14 NOTE — TELEPHONE ENCOUNTER
Wife called and wants to reschedule his heart cath agagin ASAP He had to cancel when he had ortho surgery.  Please call

## 2023-08-15 ENCOUNTER — TELEPHONE (OUTPATIENT)
Age: 73
End: 2023-08-15

## 2023-08-15 NOTE — TELEPHONE ENCOUNTER
Patient called stating he is scheduled for a procedure cath/angiography tomorrow 8/16. Patient states he is adamant in regard to not staying overnight at [facility] due to a previous experience that Dr Donna Aguillon is fully aware of per patient. Patient states he wants to get this message to Dr Luis Pascual.       Please call and advise

## 2023-08-15 NOTE — TELEPHONE ENCOUNTER
Wife says her  has got to talk to Dr Kyra Gray or he is afraid to even go to the hospital. Had a very bad experience at [facility] and is terrified to stay overnight, When they called from pre op they told him to pack a bag and he went all to pieces. Dr Kyra Gray said he would have the procedure and go home .  She wants us to have Dr Kyra Gray call him

## 2023-08-15 NOTE — TELEPHONE ENCOUNTER
This nurse spoke with Dr Kyra Gray regarding pt's concerns. Dr Kyra Gray stated he will make every effort to send the patient home post-procedure. Dr Kyra Gray does not plan to keep pt overnight unless there is a complication or concern. This nurse called patient to discuss above. Attempted to answer questions and provide patient with reassurance. Patient stated \"I would rather jump off a building than have this done because at least then I have some control\". Pt interrupted several times and hung up on this nurse after stating \"There's no way I'm doing this, let's just cancel the entire thing\". Cath lab notified. Pt will remain on schedule for tomorrow in the hopes that he will reconsider.

## 2023-08-18 ENCOUNTER — OFFICE VISIT (OUTPATIENT)
Dept: ORTHOPEDIC SURGERY | Age: 73
End: 2023-08-18

## 2023-08-18 DIAGNOSIS — S99.911A INJURY OF RIGHT ANKLE, INITIAL ENCOUNTER: Primary | ICD-10-CM

## 2023-08-18 PROCEDURE — 99024 POSTOP FOLLOW-UP VISIT: CPT | Performed by: ORTHOPAEDIC SURGERY

## 2023-08-18 NOTE — PROGRESS NOTES
briefly discussed how NSAIDs can cause GI irritation and Kidney damage. I also discussed Tylenols effect on the Liver.      F/u 4 weeks w/ no Xray

## 2023-08-20 DIAGNOSIS — S99.911A INJURY OF RIGHT ANKLE, INITIAL ENCOUNTER: ICD-10-CM

## 2023-08-20 RX ORDER — SALICYLIC ACID 40 %
ADHESIVE PATCH, MEDICATED TOPICAL
Qty: 42 TABLET | Refills: 0 | OUTPATIENT
Start: 2023-08-20

## 2023-09-15 ENCOUNTER — OFFICE VISIT (OUTPATIENT)
Dept: ORTHOPEDIC SURGERY | Age: 73
End: 2023-09-15

## 2023-09-15 DIAGNOSIS — S99.911A INJURY OF RIGHT ANKLE, INITIAL ENCOUNTER: Primary | ICD-10-CM

## 2023-09-15 PROCEDURE — 99024 POSTOP FOLLOW-UP VISIT: CPT | Performed by: ORTHOPAEDIC SURGERY

## 2023-09-15 NOTE — PROGRESS NOTES
Name: Lizzie Broussard  YOB: 1950  Gender: male  MRN: [id number]      Procedure: Right Calf  Debridement Incision And Drainage - Right    Surgery Date: 8/2/2023      Subjective: Denies fevers chills or infection. Denies any signs of spreading erythema. Doing much better now. States pain is almost gone. Current pain: 0 / 10    ROS: Patient Denies fever/chills, headache, visual changes, chest pain, shortness of breath, and nausea/vomiting/diarrhea     Exam:   Gen: AAOx3 NAD  Resp: CTAB - no wheezing  Card: RRR  Eyes: sclera non icteric    Wound healing appropriately. No signs of dehiscence or infection. No signs of erythema or drainage. With toes warm and well-perfused. Wounds: appears to be healing well with good reapproximation, healing well ,\  Edema/swelling: mild at the incision and surgical site  Tenderness: mild at the incision and surgical site  He has 4 out of 5 strength of his Achilles tendon and gastroc complex. There is obvious pain is when he pushes against resistance over the medial head of the gastroc  EHL, FHL, anterior tib, gastroc, peroneals with 5 out of 5 strength. Neuro:  normal SILT to s/s/sp/dp/t. Reflexes normal: 1+ patella reflex bilaterally, 1+ achilles reflex bilaterally, negative babinski bilaterally. no signs of hyper reflexia or absent reflex    Vascular: BLE: 2+ DP pulse, toes wwp w/ BCR<2s    Imaging:   No imaging reviewed    Intraoperative cultures were negative. Complications post op: none  Intraoperative pathology showed hematoma without signs of malignancy    Assessment/Plan:    Doing very well.   Follow-up as needed

## 2023-09-27 DIAGNOSIS — S99.911A INJURY OF RIGHT ANKLE, INITIAL ENCOUNTER: ICD-10-CM

## 2023-09-29 RX ORDER — SALICYLIC ACID 40 %
ADHESIVE PATCH, MEDICATED TOPICAL
Qty: 42 TABLET | Refills: 0 | OUTPATIENT
Start: 2023-09-29

## 2023-09-29 RX ORDER — DOCUSATE SODIUM 100 MG/1
100 CAPSULE, LIQUID FILLED ORAL DAILY PRN
Qty: 30 CAPSULE | Refills: 0 | OUTPATIENT
Start: 2023-09-29

## 2023-12-17 DIAGNOSIS — S99.911A INJURY OF RIGHT ANKLE, INITIAL ENCOUNTER: ICD-10-CM

## 2023-12-18 RX ORDER — DOCUSATE SODIUM 100 MG/1
100 CAPSULE, LIQUID FILLED ORAL DAILY PRN
Qty: 30 CAPSULE | Refills: 0 | OUTPATIENT
Start: 2023-12-18

## 2024-01-08 ENCOUNTER — OFFICE VISIT (OUTPATIENT)
Dept: INTERNAL MEDICINE CLINIC | Facility: CLINIC | Age: 74
End: 2024-01-08
Payer: MEDICARE

## 2024-01-08 VITALS
WEIGHT: 186 LBS | HEIGHT: 67 IN | BODY MASS INDEX: 29.19 KG/M2 | SYSTOLIC BLOOD PRESSURE: 120 MMHG | DIASTOLIC BLOOD PRESSURE: 66 MMHG | HEART RATE: 50 BPM

## 2024-01-08 DIAGNOSIS — Z98.61 CAD S/P PERCUTANEOUS CORONARY ANGIOPLASTY: ICD-10-CM

## 2024-01-08 DIAGNOSIS — I50.22 CHRONIC HFREF (HEART FAILURE WITH REDUCED EJECTION FRACTION) (HCC): ICD-10-CM

## 2024-01-08 DIAGNOSIS — C61 PROSTATE CANCER (HCC): ICD-10-CM

## 2024-01-08 DIAGNOSIS — I25.10 CAD S/P PERCUTANEOUS CORONARY ANGIOPLASTY: ICD-10-CM

## 2024-01-08 DIAGNOSIS — I77.9 CAROTID ARTERY DISEASE, UNSPECIFIED LATERALITY, UNSPECIFIED TYPE (HCC): ICD-10-CM

## 2024-01-08 DIAGNOSIS — R91.1 LUNG NODULE: ICD-10-CM

## 2024-01-08 DIAGNOSIS — F51.04 CHRONIC INSOMNIA: ICD-10-CM

## 2024-01-08 DIAGNOSIS — N52.34 ERECTILE DYSFUNCTION FOLLOWING SIMPLE PROSTATECTOMY: ICD-10-CM

## 2024-01-08 DIAGNOSIS — N18.31 CHRONIC KIDNEY DISEASE, STAGE 3A (HCC): ICD-10-CM

## 2024-01-08 DIAGNOSIS — E78.2 HYPERLIPIDEMIA, MIXED: Primary | ICD-10-CM

## 2024-01-08 DIAGNOSIS — I10 HYPERTENSION, ESSENTIAL: ICD-10-CM

## 2024-01-08 PROBLEM — I16.0 HYPERTENSIVE URGENCY, MALIGNANT: Status: RESOLVED | Noted: 2020-10-11 | Resolved: 2024-01-08

## 2024-01-08 PROBLEM — A41.9 SEPSIS (HCC): Status: RESOLVED | Noted: 2020-10-11 | Resolved: 2024-01-08

## 2024-01-08 PROBLEM — R07.9 CHEST PAIN: Status: RESOLVED | Noted: 2020-10-10 | Resolved: 2024-01-08

## 2024-01-08 PROBLEM — N17.9 ACUTE KIDNEY INJURY (HCC): Status: RESOLVED | Noted: 2020-10-11 | Resolved: 2024-01-08

## 2024-01-08 PROBLEM — R41.0 DELIRIUM: Status: RESOLVED | Noted: 2020-10-12 | Resolved: 2024-01-08

## 2024-01-08 PROBLEM — T14.8XXA HEMATOMA: Status: RESOLVED | Noted: 2023-07-24 | Resolved: 2024-01-08

## 2024-01-08 PROBLEM — M79.661 RIGHT CALF PAIN: Status: RESOLVED | Noted: 2023-07-24 | Resolved: 2024-01-08

## 2024-01-08 PROBLEM — R79.89 ELEVATED TROPONIN: Status: RESOLVED | Noted: 2020-10-10 | Resolved: 2024-01-08

## 2024-01-08 PROBLEM — D72.829 LEUKOCYTOSIS: Status: RESOLVED | Noted: 2020-10-10 | Resolved: 2024-01-08

## 2024-01-08 PROBLEM — I20.0 ACCELERATING ANGINA (HCC): Status: RESOLVED | Noted: 2023-07-17 | Resolved: 2024-01-08

## 2024-01-08 PROBLEM — E78.00 HYPERCHOLESTEROLEMIA: Status: ACTIVE | Noted: 2024-01-08

## 2024-01-08 PROBLEM — R41.0 ACUTE DELIRIUM: Status: RESOLVED | Noted: 2020-10-11 | Resolved: 2024-01-08

## 2024-01-08 PROBLEM — J69.0 ASPIRATION PNEUMONIA (HCC): Status: RESOLVED | Noted: 2020-10-10 | Resolved: 2024-01-08

## 2024-01-08 PROCEDURE — 3078F DIAST BP <80 MM HG: CPT | Performed by: INTERNAL MEDICINE

## 2024-01-08 PROCEDURE — 3074F SYST BP LT 130 MM HG: CPT | Performed by: INTERNAL MEDICINE

## 2024-01-08 PROCEDURE — 1036F TOBACCO NON-USER: CPT | Performed by: INTERNAL MEDICINE

## 2024-01-08 PROCEDURE — G8417 CALC BMI ABV UP PARAM F/U: HCPCS | Performed by: INTERNAL MEDICINE

## 2024-01-08 PROCEDURE — G8428 CUR MEDS NOT DOCUMENT: HCPCS | Performed by: INTERNAL MEDICINE

## 2024-01-08 PROCEDURE — G8484 FLU IMMUNIZE NO ADMIN: HCPCS | Performed by: INTERNAL MEDICINE

## 2024-01-08 PROCEDURE — 1123F ACP DISCUSS/DSCN MKR DOCD: CPT | Performed by: INTERNAL MEDICINE

## 2024-01-08 PROCEDURE — 99204 OFFICE O/P NEW MOD 45 MIN: CPT | Performed by: INTERNAL MEDICINE

## 2024-01-08 PROCEDURE — 3017F COLORECTAL CA SCREEN DOC REV: CPT | Performed by: INTERNAL MEDICINE

## 2024-01-08 RX ORDER — LOSARTAN POTASSIUM 25 MG/1
25 TABLET ORAL DAILY
COMMUNITY

## 2024-01-08 RX ORDER — ACETAMINOPHEN 160 MG
TABLET,DISINTEGRATING ORAL DAILY
COMMUNITY

## 2024-01-08 RX ORDER — TRAZODONE HYDROCHLORIDE 100 MG/1
100 TABLET ORAL NIGHTLY PRN
Qty: 90 TABLET | Refills: 1 | Status: SHIPPED | OUTPATIENT
Start: 2024-01-08

## 2024-01-08 RX ORDER — CHLORAL HYDRATE 500 MG
1000 CAPSULE ORAL DAILY
COMMUNITY

## 2024-01-08 RX ORDER — HYDRALAZINE HYDROCHLORIDE 10 MG/1
125 TABLET, FILM COATED ORAL 3 TIMES DAILY
COMMUNITY

## 2024-01-08 RX ORDER — TRAZODONE HYDROCHLORIDE 100 MG/1
TABLET ORAL
Qty: 90 TABLET | Refills: 0 | Status: CANCELLED | OUTPATIENT
Start: 2024-01-08

## 2024-01-08 ASSESSMENT — ENCOUNTER SYMPTOMS
EYE PAIN: 0
RECTAL PAIN: 0
STRIDOR: 0
VOICE CHANGE: 0
CHOKING: 0

## 2024-01-08 ASSESSMENT — PATIENT HEALTH QUESTIONNAIRE - PHQ9
1. LITTLE INTEREST OR PLEASURE IN DOING THINGS: 0
SUM OF ALL RESPONSES TO PHQ QUESTIONS 1-9: 0
SUM OF ALL RESPONSES TO PHQ QUESTIONS 1-9: 0
DEPRESSION UNABLE TO ASSESS: PT REFUSES
SUM OF ALL RESPONSES TO PHQ9 QUESTIONS 1 & 2: 0
SUM OF ALL RESPONSES TO PHQ QUESTIONS 1-9: 0
SUM OF ALL RESPONSES TO PHQ QUESTIONS 1-9: 0
2. FEELING DOWN, DEPRESSED OR HOPELESS: 0

## 2024-01-08 NOTE — PROGRESS NOTES
30-35%, mod MR, LAE    Clinically compensated on losartan and Lasix PRN.  Unable to take beta blockers due to chronic sinus bradycardia.  Scheduled for cardiology followup at [facility].    3. Carotid artery disease, unspecified laterality, unspecified type (HCC)  Overview:  Scheduled for left CEA at [facility] in January 2024.  Continue aspirin and statin therapy.    Orders:  -     CBC with Auto Differential; Future  -     Comprehensive Metabolic Panel; Future  4. CAD S/P percutaneous coronary angioplasty  Overview:  Patient had NSTEMI in 2020 followed by LAD and left circumflex PCI / stents.  Evaluated by cardiac surgery at [facility] in 12/2023 who recommended continued medical management of his coronary disease.  Previously followed at [facility] and [facility].  No scheduled to be established with [facility] cardiologist.  Continue aspirin and statin therapy.  Beta blockers not tolerated to his chronic sinus bradycardia.    5. Chronic kidney disease, stage 3a ([facility])  Overview:  11/29/23 creatinine 1.50, eGFR 49    Avoid NSAIDs / other nephrotoxins.  Dose adjust medications as needed.  Follow labs.      6. Erectile dysfunction following simple prostatectomy  Overview:      7. Hypertension, essential  Overview:  Blood pressure well controlled.  The patient will continue the current treatment.       8. Prostate cancer ([facility])  Overview:  3/30/23 PSA 0.1    Status post prostatectomy 2009.  Follow annual PSA.      Orders:  -     PSA, Diagnostic; Future  9. Chronic insomnia  Overview:  Relieved by trazodone PRN.   Orders:  -     traZODone (DESYREL) 100 MG tablet; Take 1 tablet by mouth nightly as needed for Sleep, Disp-90 tablet, R-1Normal  10. Lung nodule  Overview:  11/22/23 CTA chest - incidental 1.2 cm RLL nodule.  Repeat CT in 3 months.      CT ordered.    Orders:  -     CT CHEST WO CONTRAST; Future        The patient and/or patient representative voiced understanding and agreement with the

## 2024-01-26 ENCOUNTER — APPOINTMENT (RX ONLY)
Dept: URBAN - METROPOLITAN AREA CLINIC 329 | Facility: CLINIC | Age: 74
Setting detail: DERMATOLOGY
End: 2024-01-26

## 2024-01-26 DIAGNOSIS — D22 MELANOCYTIC NEVI: ICD-10-CM

## 2024-01-26 DIAGNOSIS — L57.8 OTHER SKIN CHANGES DUE TO CHRONIC EXPOSURE TO NONIONIZING RADIATION: ICD-10-CM

## 2024-01-26 DIAGNOSIS — L81.4 OTHER MELANIN HYPERPIGMENTATION: ICD-10-CM

## 2024-01-26 DIAGNOSIS — B35.4 TINEA CORPORIS: ICD-10-CM | Status: WELL CONTROLLED

## 2024-01-26 DIAGNOSIS — D18.0 HEMANGIOMA: ICD-10-CM

## 2024-01-26 DIAGNOSIS — L82.1 OTHER SEBORRHEIC KERATOSIS: ICD-10-CM

## 2024-01-26 PROBLEM — D22.71 MELANOCYTIC NEVI OF RIGHT LOWER LIMB, INCLUDING HIP: Status: ACTIVE | Noted: 2024-01-26

## 2024-01-26 PROBLEM — D22.72 MELANOCYTIC NEVI OF LEFT LOWER LIMB, INCLUDING HIP: Status: ACTIVE | Noted: 2024-01-26

## 2024-01-26 PROBLEM — D22.5 MELANOCYTIC NEVI OF TRUNK: Status: ACTIVE | Noted: 2024-01-26

## 2024-01-26 PROBLEM — D18.01 HEMANGIOMA OF SKIN AND SUBCUTANEOUS TISSUE: Status: ACTIVE | Noted: 2024-01-26

## 2024-01-26 PROBLEM — D22.61 MELANOCYTIC NEVI OF RIGHT UPPER LIMB, INCLUDING SHOULDER: Status: ACTIVE | Noted: 2024-01-26

## 2024-01-26 PROCEDURE — ? PRESCRIPTION MEDICATION MANAGEMENT

## 2024-01-26 PROCEDURE — ? TREATMENT REGIMEN

## 2024-01-26 PROCEDURE — ? COUNSELING

## 2024-01-26 PROCEDURE — 87220 TISSUE EXAM FOR FUNGI: CPT

## 2024-01-26 PROCEDURE — ? PRESCRIPTION

## 2024-01-26 PROCEDURE — 99213 OFFICE O/P EST LOW 20 MIN: CPT

## 2024-01-26 PROCEDURE — ? KOH PREP

## 2024-01-26 RX ORDER — TERBINAFINE HYDROCHLORIDE 250 MG/1
TABLET ORAL
Qty: 30 | Refills: 2 | Status: ERX | COMMUNITY
Start: 2024-01-26

## 2024-01-26 RX ORDER — CICLOPIROX OLAMINE 7.7 MG/G
CREAM TOPICAL
Qty: 30 | Refills: 3 | Status: ERX | COMMUNITY
Start: 2024-01-26

## 2024-01-26 RX ADMIN — TERBINAFINE HYDROCHLORIDE: 250 TABLET ORAL at 00:00

## 2024-01-26 RX ADMIN — CICLOPIROX OLAMINE: 7.7 CREAM TOPICAL at 00:00

## 2024-01-26 ASSESSMENT — LOCATION SIMPLE DESCRIPTION DERM
LOCATION SIMPLE: RIGHT BUTTOCK
LOCATION SIMPLE: RIGHT CLAVICULAR SKIN
LOCATION SIMPLE: RIGHT UPPER ARM
LOCATION SIMPLE: ANTERIOR SCALP
LOCATION SIMPLE: LEFT POSTERIOR THIGH
LOCATION SIMPLE: LEFT UPPER ARM
LOCATION SIMPLE: CHEST
LOCATION SIMPLE: LEFT CALF
LOCATION SIMPLE: RIGHT FOREARM
LOCATION SIMPLE: RIGHT THIGH
LOCATION SIMPLE: LEFT SHOULDER
LOCATION SIMPLE: LEFT UPPER BACK
LOCATION SIMPLE: ABDOMEN
LOCATION SIMPLE: UPPER BACK
LOCATION SIMPLE: RIGHT UPPER BACK
LOCATION SIMPLE: LEFT CHEEK

## 2024-01-26 ASSESSMENT — LOCATION DETAILED DESCRIPTION DERM
LOCATION DETAILED: LEFT DISTAL CALF
LOCATION DETAILED: RIGHT ANTERIOR DISTAL THIGH
LOCATION DETAILED: SUPERIOR THORACIC SPINE
LOCATION DETAILED: RIGHT VENTRAL DISTAL FOREARM
LOCATION DETAILED: LEFT LATERAL UPPER BACK
LOCATION DETAILED: MID-FRONTAL SCALP
LOCATION DETAILED: RIGHT CLAVICULAR SKIN
LOCATION DETAILED: LEFT CENTRAL MALAR CHEEK
LOCATION DETAILED: LEFT PROXIMAL POSTERIOR THIGH
LOCATION DETAILED: LEFT POSTERIOR SHOULDER
LOCATION DETAILED: EPIGASTRIC SKIN
LOCATION DETAILED: UPPER STERNUM
LOCATION DETAILED: RIGHT ANTERIOR DISTAL UPPER ARM
LOCATION DETAILED: LEFT ANTERIOR DISTAL UPPER ARM
LOCATION DETAILED: LEFT INFERIOR UPPER BACK
LOCATION DETAILED: RIGHT PROXIMAL DORSAL FOREARM
LOCATION DETAILED: RIGHT BUTTOCK
LOCATION DETAILED: LEFT DISTAL POSTERIOR THIGH
LOCATION DETAILED: RIGHT SUPERIOR MEDIAL UPPER BACK

## 2024-01-26 ASSESSMENT — LOCATION ZONE DERM
LOCATION ZONE: FACE
LOCATION ZONE: LEG
LOCATION ZONE: ARM
LOCATION ZONE: TRUNK
LOCATION ZONE: SCALP

## 2024-01-26 ASSESSMENT — BSA RASH: BSA RASH: 26

## 2024-01-26 NOTE — PROCEDURE: KOH PREP
Detail Level: Detailed
Cpt Desired: 77019
Showing: budding yeast and branching hyphae
Koh Intro Text (From The.....): A KOH prep was ordered and evaluated from the
Koh Procedure Text (Tissue Harvesting Technique): A 15-blade scalpel was used to scrape the skin. The skin scrapings were placed on a glass slide, covered with a coverslip and a KOH solution was applied.

## 2024-01-31 ENCOUNTER — TELEPHONE (OUTPATIENT)
Dept: INTERNAL MEDICINE CLINIC | Facility: CLINIC | Age: 74
End: 2024-01-31

## 2024-01-31 NOTE — TELEPHONE ENCOUNTER
Patient's spouse called stating that you were doing referral to Dr. Shelton when he was here on 1/8/24 for his back and called to check on referral. I don't see that one was entered. Were you placing referral for him?

## 2024-01-31 NOTE — TELEPHONE ENCOUNTER
We covered a LOT of topics during that initial first patient visit and I do not recall any conversation about back pain or Dr. Sehlton and nothing is charted.  Has he seen Dr. Shelton in the past?

## 2024-02-02 DIAGNOSIS — G89.29 CHRONIC LOW BACK PAIN, UNSPECIFIED BACK PAIN LATERALITY, UNSPECIFIED WHETHER SCIATICA PRESENT: Primary | ICD-10-CM

## 2024-02-02 DIAGNOSIS — M54.50 CHRONIC LOW BACK PAIN, UNSPECIFIED BACK PAIN LATERALITY, UNSPECIFIED WHETHER SCIATICA PRESENT: Primary | ICD-10-CM

## 2024-02-02 NOTE — TELEPHONE ENCOUNTER
Spoke with wife. She was very upset that referral has not been sent to Dr. Shelton for his back pain since it has been a month when he visited first.   Patient is experiencing bad back pain.   Offered for referral to different Dr so that he could be seen than Dr. Shelton.   Wife refused stating that they only want Dr. Shelton.     Also, requesting something while he is waiting for this referral.

## 2024-02-06 ENCOUNTER — TELEPHONE (OUTPATIENT)
Dept: INTERNAL MEDICINE CLINIC | Facility: CLINIC | Age: 74
End: 2024-02-06

## 2024-02-06 DIAGNOSIS — G89.29 CHRONIC LOW BACK PAIN, UNSPECIFIED BACK PAIN LATERALITY, UNSPECIFIED WHETHER SCIATICA PRESENT: Primary | ICD-10-CM

## 2024-02-06 DIAGNOSIS — M54.50 CHRONIC LOW BACK PAIN, UNSPECIFIED BACK PAIN LATERALITY, UNSPECIFIED WHETHER SCIATICA PRESENT: Primary | ICD-10-CM

## 2024-02-06 NOTE — TELEPHONE ENCOUNTER
Wife called stating that Dr. Shelton is not able to see patient for 3 months and meanwhile Dr. Shelton's office is requesting MRI of the back from PCP.

## 2024-02-08 ENCOUNTER — HOSPITAL ENCOUNTER (OUTPATIENT)
Age: 74
Discharge: HOME | End: 2024-02-10
Attending: INTERNAL MEDICINE
Payer: MEDICARE

## 2024-02-08 ENCOUNTER — HOSPITAL ENCOUNTER (OUTPATIENT)
Dept: CT IMAGING | Age: 74
Discharge: HOME | End: 2024-02-10
Attending: INTERNAL MEDICINE
Payer: MEDICARE

## 2024-02-08 DIAGNOSIS — G89.29 CHRONIC LOW BACK PAIN, UNSPECIFIED BACK PAIN LATERALITY, UNSPECIFIED WHETHER SCIATICA PRESENT: ICD-10-CM

## 2024-02-08 DIAGNOSIS — M54.50 CHRONIC LOW BACK PAIN, UNSPECIFIED BACK PAIN LATERALITY, UNSPECIFIED WHETHER SCIATICA PRESENT: ICD-10-CM

## 2024-02-08 DIAGNOSIS — R91.1 LUNG NODULE: ICD-10-CM

## 2024-02-08 PROCEDURE — 72148 MRI LUMBAR SPINE W/O DYE: CPT

## 2024-02-08 PROCEDURE — 71250 CT THORAX DX C-: CPT

## 2024-02-09 ENCOUNTER — TELEPHONE (OUTPATIENT)
Dept: INTERNAL MEDICINE CLINIC | Facility: CLINIC | Age: 74
End: 2024-02-09

## 2024-02-09 PROBLEM — Z86.19 HISTORY OF COCCIDIOIDOMYCOSIS: Status: ACTIVE | Noted: 2024-02-09

## 2024-02-09 NOTE — TELEPHONE ENCOUNTER
Called patient and informed.   Patient wanted to let Dr. Leslie aware that he had a valley fever back in [place].

## 2024-02-09 NOTE — TELEPHONE ENCOUNTER
----- Message from Paulo Leslie MD sent at 2/8/2024  7:37 PM EST -----  Please call the patient.  There are multiple abnormalities on the MRI of his lumbar spine.  I have ordered this MRI as requested by Dr. Shelton's office and have placed a referral to Dr. Shelton as requested.  Please advise patient to call Dr. Shelton's offic  e and confirm that they have reviewed this MRI in order to properly schedule his appointment with neurosurgery Dr. Shelton.

## 2024-02-09 NOTE — RESULT ENCOUNTER NOTE
Please call the patient.  There are multiple abnormalities on the MRI of his lumbar spine.  I have ordered this MRI as requested by Dr. Shelton's office and have placed a referral to Dr. Shelton as requested.  Please advise patient to call Dr. Shelton's office and confirm that they have reviewed this MRI in order to properly schedule his appointment with neurosurgery Dr. Shelton.

## 2024-02-12 ENCOUNTER — TELEPHONE (OUTPATIENT)
Dept: INTERNAL MEDICINE CLINIC | Facility: CLINIC | Age: 74
End: 2024-02-12

## 2024-02-12 NOTE — TELEPHONE ENCOUNTER
He would need an appointment.  Between his CKD and allergies it is going to be difficult to find something for pain relief.

## 2024-02-12 NOTE — TELEPHONE ENCOUNTER
Wife called regarding  back pain.   Stating that his appointment with Dr. Shelton is in 3 months. Requesting something for pain.

## 2024-02-15 ENCOUNTER — OFFICE VISIT (OUTPATIENT)
Dept: INTERNAL MEDICINE CLINIC | Facility: CLINIC | Age: 74
End: 2024-02-15
Payer: MEDICARE

## 2024-02-15 VITALS
HEIGHT: 67 IN | HEART RATE: 55 BPM | SYSTOLIC BLOOD PRESSURE: 132 MMHG | DIASTOLIC BLOOD PRESSURE: 60 MMHG | BODY MASS INDEX: 29.82 KG/M2 | WEIGHT: 190 LBS

## 2024-02-15 DIAGNOSIS — G89.29 CHRONIC BILATERAL LOW BACK PAIN WITHOUT SCIATICA: ICD-10-CM

## 2024-02-15 DIAGNOSIS — R91.1 LUNG NODULE: Primary | ICD-10-CM

## 2024-02-15 DIAGNOSIS — N18.31 CHRONIC KIDNEY DISEASE, STAGE 3A (HCC): ICD-10-CM

## 2024-02-15 DIAGNOSIS — N28.1 RENAL CYST, RIGHT: ICD-10-CM

## 2024-02-15 DIAGNOSIS — M54.50 CHRONIC BILATERAL LOW BACK PAIN WITHOUT SCIATICA: ICD-10-CM

## 2024-02-15 DIAGNOSIS — I10 HYPERTENSION, ESSENTIAL: ICD-10-CM

## 2024-02-15 PROCEDURE — 1036F TOBACCO NON-USER: CPT | Performed by: INTERNAL MEDICINE

## 2024-02-15 PROCEDURE — 1123F ACP DISCUSS/DSCN MKR DOCD: CPT | Performed by: INTERNAL MEDICINE

## 2024-02-15 PROCEDURE — G8484 FLU IMMUNIZE NO ADMIN: HCPCS | Performed by: INTERNAL MEDICINE

## 2024-02-15 PROCEDURE — G8417 CALC BMI ABV UP PARAM F/U: HCPCS | Performed by: INTERNAL MEDICINE

## 2024-02-15 PROCEDURE — 99214 OFFICE O/P EST MOD 30 MIN: CPT | Performed by: INTERNAL MEDICINE

## 2024-02-15 PROCEDURE — G8428 CUR MEDS NOT DOCUMENT: HCPCS | Performed by: INTERNAL MEDICINE

## 2024-02-15 PROCEDURE — 3078F DIAST BP <80 MM HG: CPT | Performed by: INTERNAL MEDICINE

## 2024-02-15 PROCEDURE — 3075F SYST BP GE 130 - 139MM HG: CPT | Performed by: INTERNAL MEDICINE

## 2024-02-15 PROCEDURE — 3017F COLORECTAL CA SCREEN DOC REV: CPT | Performed by: INTERNAL MEDICINE

## 2024-02-15 RX ORDER — TRAMADOL HYDROCHLORIDE 50 MG/1
50 TABLET ORAL EVERY 8 HOURS PRN
Qty: 90 TABLET | Refills: 2 | Status: SHIPPED | OUTPATIENT
Start: 2024-02-15 | End: 2025-02-14

## 2024-02-15 NOTE — PROGRESS NOTES
Component Date Value Ref Range Status    Hemoglobin 08/02/2023 11.9 (L)  13.6 - 17.2 g/dL Final    POC Potassium 08/02/2023 4.5  3.5 - 5.1 MMOL/L Final    Special Requests 08/02/2023 NO SPECIAL REQUESTS    Final    Gram stain 08/02/2023 0 TO 2 WBCS PER OIF   Final    Gram stain 08/02/2023 NO DEFINITE ORGANISM SEEN    Final    Culture 08/02/2023 NO GROWTH 2 DAYS    Final     MRI Result (most recent):  MRI LUMBAR SPINE WO CONTRAST 02/08/2024    Narrative  History: Low back pain, unspecified; Other chronic pain    Exam: MRI lumbar spine without contrast    Technique: Axial and sagittal T1 and T2-weighted sequences are available for  review.  A sagittal STIR sequence is also available for review.    COMPARISON:  None    FINDINGS: Levoscoliosis is noted. There is Modic edema type endplate change at  L1-2. There is grade 1 retrolisthesis at L1-2, 0.4 cm. Disc signal shows  desiccation. The facet articulations are aligned.    The L1-L2 level: There is disc extrusion which extends beyond the L1 and L2  endplates, attached at the margin, measuring 0.6 x 1.1 x 1.3 cm. There is  moderate bilateral lateral recess stenosis. There is moderate bilateral  foraminal narrowing secondary to disc protrusion and facet hypertrophy. There is  severe central canal stenosis, partly secondary to ligamentous hypertrophy.  There is clumping of the nerve roots which can indicate arachnoiditis.    The L2-L3 level: There is mild central and right and left paracentral disc  protrusion. There is mild bilateral lateral recess effacement. There is no  significant foraminal narrowing. There is no central canal stenosis.    The L3-L4 level: There is moderate central and right and left paracentral disc  protrusion. There is moderate bilateral lateral recess stenosis. There is mild  bilateral foraminal narrowing secondary to disc protrusion and facet  hypertrophy. There is moderate central canal stenosis.    The L4-L5 level: There is disc extrusion

## 2024-02-28 ENCOUNTER — TELEPHONE (OUTPATIENT)
Dept: INTERNAL MEDICINE CLINIC | Facility: CLINIC | Age: 74
End: 2024-02-28

## 2024-02-28 DIAGNOSIS — G89.29 CHRONIC BILATERAL LOW BACK PAIN WITHOUT SCIATICA: Primary | ICD-10-CM

## 2024-02-28 DIAGNOSIS — M54.50 CHRONIC BILATERAL LOW BACK PAIN WITHOUT SCIATICA: Primary | ICD-10-CM

## 2024-03-12 ENCOUNTER — HOSPITAL ENCOUNTER (OUTPATIENT)
Dept: PHYSICAL THERAPY | Age: 74
Setting detail: RECURRING SERIES
Discharge: HOME | End: 2024-03-15
Payer: MEDICARE

## 2024-03-12 DIAGNOSIS — M54.51 VERTEBROGENIC LOW BACK PAIN: Primary | ICD-10-CM

## 2024-03-12 DIAGNOSIS — M62.81 MUSCLE WEAKNESS (GENERALIZED): ICD-10-CM

## 2024-03-12 DIAGNOSIS — G89.29 OTHER CHRONIC PAIN: ICD-10-CM

## 2024-03-12 PROCEDURE — 97163 PT EVAL HIGH COMPLEX 45 MIN: CPT

## 2024-03-12 PROCEDURE — 97110 THERAPEUTIC EXERCISES: CPT

## 2024-03-12 NOTE — PROGRESS NOTES
Dionisio Waggoner  : 1950  Primary: Medicare Part A And B (Medicare)  Secondary: MUTUAL [place] MEDICARE SUPP [facility] @ [place] [address] [address] [place] [address]  Phone: [phone]  Fax: [phone] Plan Frequency: 2x for 8 weeks    Plan of Care/Certification Expiration Date: 24      >PT Visit Info:  Plan Frequency: 2x for 8 weeks  Plan of Care/Certification Expiration Date: 24      Visit Count:  1    OUTPATIENT PHYSICAL THERAPY:OP NOTE TYPE: Treatment Note 3/12/2024       Episode  }Appt Desk             Treatment Diagnosis:    Vertebrogenic low back pain  Other chronic pain  Muscle weakness (generalized)  Medical/Referring Diagnosis:  Low back pain, unspecified [M54.50]  Referring Physician:  Paulo Leslie MD MD Orders:  PT Eval and Treat    Date of Onset:  Onset Date: 24 (Acute on chronic irritation)     Allergies:   Hydrocodone, Codeine, Oxycodone, Zolpidem, and Propofol  Restrictions/Precautions:  No data recordedNo data recorded   Interventions Planned (Treatment may consist of any combination of the following):    No data recorded   >Subjective Comments:   Chief complaint of low back pain, severe irritation. See Eval note.  >Initial:     7/10>Post Session:       7/10  Medications Last Reviewed:  3/12/2024  Updated Objective Findings:  See Evaluation Note from today  Treatment   THERAPEUTIC EXERCISE: (15 minutes):    Exercises per grid below to improve mobility, strength, and balance.  Required minimal verbal and manual cues to promote proper body alignment, promote proper body posture, and promote proper body mechanics.  Progressed resistance, range, and repetitions as indicated.  Date: 3/12/24  L1/2 gapping mjm grade 2/3   LTR x1 min   Open books x8 reps   Supine ppt x10\" x8 reps - cue for LE relaxation       Deferred           Treatment/Session Summary:    >Treatment Assessment:   Sherif tolerated evaluation well, demonstrated good understanding

## 2024-03-12 NOTE — THERAPY EVALUATION
demonstrate success of independent management and decrease symptoms   Pt will demonstrate proper abdominal and glute activation with exercise and functional tasks    Pt will demonstrate full lumbar AROM without pain or compensation to attain picking up objects from the ground   Discharge Goals: Time Frame: 8 weeks   Pt will report at least 90% improvement in pain and function to return to PLOF and pain free status    Pt will demonstrate proper hip hinging with addition of weight to mimic functional bending and lifting at home   Pt will achieve walking up and down flights of stairs x3 sets to progress single leg stance control and strength and attain stair negotiation at home and in the community.   Pt will report meaningful change in REYNALDO score   Pt will achieve single leg stance x2 min B without compensation, LOB, or pain to progress stance control for ADL's  Pt will demonstrate x15 sit <-> stand without symptoms or compensation to improve LE strength and endurance        Outcome Measure:   Tool Used: Modified Oswestry Low Back Pain Questionnaire  Score:  Initial: 37/50  Most Recent: X/50 (Date: -- )   Interpretation of Score: Each section is scored on a 0-5 scale, 5 representing the greatest disability.  The scores of each section are added together for a total score of 50.      Medical Necessity:   > Skilled intervention continues to be required due to increased lumbar pain, limited functional mobility and weakness.  Reason For Services/Other Comments:  > Patient continues to require skilled intervention due to increased lumbar pain limiting functional abilities for ADL's and hobbies.      Regarding Dionisio Waggoner's therapy, I certify that the treatment plan above will be carried out by a therapist or under their direction.  Thank you for this referral,  DWAIN VÁSQUEZ, PT     Referring Physician Signature: Paulo Leslie MD                    Charge Capture  Appt Desk

## 2024-03-13 ASSESSMENT — PAIN SCALES - GENERAL: PAINLEVEL_OUTOF10: 7

## 2024-03-14 ENCOUNTER — HOSPITAL ENCOUNTER (OUTPATIENT)
Dept: PHYSICAL THERAPY | Age: 74
Setting detail: RECURRING SERIES
Discharge: HOME | End: 2024-03-17
Payer: MEDICARE

## 2024-03-14 PROCEDURE — 97110 THERAPEUTIC EXERCISES: CPT

## 2024-03-14 PROCEDURE — 97140 MANUAL THERAPY 1/> REGIONS: CPT

## 2024-03-14 ASSESSMENT — PAIN SCALES - GENERAL: PAINLEVEL_OUTOF10: 6

## 2024-03-14 NOTE — PROGRESS NOTES
Dionisio Waggoner  : 1950  Primary: Medicare Part A And B (Medicare)  Secondary: MUTUAL [place] MEDICARE SUPP [facility] @ [place] [address] [address] [place] [address]  Phone: [phone]  Fax: [phone] Plan Frequency: 2x for 8 weeks    Plan of Care/Certification Expiration Date: 24      >PT Visit Info:  Plan Frequency: 2x for 8 weeks  Plan of Care/Certification Expiration Date: 24      Visit Count:  2    OUTPATIENT PHYSICAL THERAPY:OP NOTE TYPE: Treatment Note 3/14/2024       Episode  }Appt Desk             Treatment Diagnosis:    Vertebrogenic low back pain  Other chronic pain  Muscle weakness (generalized)  Medical/Referring Diagnosis:  Low back pain, unspecified [M54.50]  Referring Physician:  Paulo Leslie MD MD Orders:  PT Eval and Treat    Date of Onset:  Onset Date: 24 (Acute on chronic irritation)     Allergies:   Hydrocodone, Codeine, Oxycodone, Zolpidem, and Propofol  Restrictions/Precautions:  No data recordedNo data recorded   Interventions Planned (Treatment may consist of any combination of the following):    No data recorded   >Subjective Comments:  Sherif reported he is doing ok, back feels looser.   >Initial:     6/10>Post Session:       6/10  Medications Last Reviewed:  3/14/2024  Updated Objective Findings:    Lumbar flexion ROM greatly improved with improved speed of movement and minimal pain reported  L SB very limited and painful throughout L flank   Treatment   MANUAL THERAPY: (23 minutes):   Joint mobilization and Soft tissue mobilization was utilized and necessary because of the patient's restricted joint motion, painful spasm, and loss of articular motion.   Date: 24    L1/2 L4/5 L5/S1 gapping mjm grade 3/4 B   S/l lumbar distraction   B hip PROM ER holds   B hip LAD mjm grade 3/4     Deferred             THERAPEUTIC EXERCISE: (34 minutes):    Exercises per grid below to improve mobility, strength, and balance.  Required

## 2024-03-19 ENCOUNTER — HOSPITAL ENCOUNTER (OUTPATIENT)
Dept: PHYSICAL THERAPY | Age: 74
Setting detail: RECURRING SERIES
Discharge: HOME | End: 2024-03-22
Payer: MEDICARE

## 2024-03-19 PROCEDURE — 97140 MANUAL THERAPY 1/> REGIONS: CPT

## 2024-03-19 PROCEDURE — 97110 THERAPEUTIC EXERCISES: CPT

## 2024-03-19 ASSESSMENT — PAIN SCALES - GENERAL: PAINLEVEL_OUTOF10: 5

## 2024-03-21 ENCOUNTER — HOSPITAL ENCOUNTER (OUTPATIENT)
Dept: PHYSICAL THERAPY | Age: 74
Setting detail: RECURRING SERIES
Discharge: HOME | End: 2024-03-24
Payer: MEDICARE

## 2024-03-21 PROCEDURE — 97140 MANUAL THERAPY 1/> REGIONS: CPT

## 2024-03-21 PROCEDURE — 97110 THERAPEUTIC EXERCISES: CPT

## 2024-03-21 ASSESSMENT — PAIN SCALES - GENERAL: PAINLEVEL_OUTOF10: 6

## 2024-03-21 NOTE — PROGRESS NOTES
Dionisio Waggoner  : 1950  Primary: Medicare Part A And B (Medicare)  Secondary: MUTUAL [place] MEDICARE SUPP [facility] [place] [address]  Phone: [phone]  Fax: [phone] Plan Frequency: 2x for 8 weeks    Plan of Care/Certification Expiration Date: 24      >PT Visit Info:  Plan Frequency: 2x for 8 weeks  Plan of Care/Certification Expiration Date: 24      Visit Count:  4    OUTPATIENT PHYSICAL THERAPY:OP NOTE TYPE: Treatment Note 3/21/2024       Episode  }Appt Desk             Treatment Diagnosis:    Vertebrogenic low back pain  Other chronic pain  Muscle weakness (generalized)  Medical/Referring Diagnosis:  Low back pain, unspecified [M54.50]  Referring Physician:  Paulo Leslie MD MD Orders:  PT Eval and Treat    Date of Onset:  Onset Date: 24 (Acute on chronic irritation)     Allergies:   Hydrocodone, Codeine, Oxycodone, Zolpidem, and Propofol  Restrictions/Precautions:  No data recordedNo data recorded   Interventions Planned (Treatment may consist of any combination of the following):    No data recorded   >Subjective Comments:  Sherif reported since last session his back has been feeling like a lot of muscle spasms. He has not done his HEP the last 2 days.     >Initial:     6/10>Post Session:       3/10  Medications Last Reviewed:  3/21/2024  Updated Objective Findings:    Lumbar flexion ROM greatly improved with improved speed of movement and minimal pain reported  L SB limited and pain in L QL, no spinal pain   Treatment   MANUAL THERAPY: (23 minutes):   Joint mobilization and Soft tissue mobilization was utilized and necessary because of the patient's restricted joint motion, painful spasm, and loss of articular motion.   Date: 24    L1/2 L4/5 L5/S1 gapping mjm grade 3/4 B   S/l lumbar distraction   S/l lumbar stm/mfr    Deferred     B hip PROM ER holds   B hip LAD mjm grade 3/4     THERAPEUTIC EXERCISE: (30

## 2024-03-25 ENCOUNTER — HOSPITAL ENCOUNTER (OUTPATIENT)
Dept: PHYSICAL THERAPY | Age: 74
Setting detail: RECURRING SERIES
Discharge: HOME | End: 2024-03-28
Payer: MEDICARE

## 2024-03-25 PROCEDURE — 97140 MANUAL THERAPY 1/> REGIONS: CPT

## 2024-03-25 PROCEDURE — 97110 THERAPEUTIC EXERCISES: CPT

## 2024-03-25 ASSESSMENT — PAIN SCALES - GENERAL: PAINLEVEL_OUTOF10: 4

## 2024-03-25 NOTE — PROGRESS NOTES
Dionisio Waggoner  : 1950  Primary: Medicare Part A And B (Medicare)  Secondary: MUTUAL [place] MEDICARE SUPP [facility] [place]  [place] [address]  Phone: [phone]  Fax: [phone] Plan Frequency: 2x for 8 weeks    Plan of Care/Certification Expiration Date: 24      >PT Visit Info:  Plan Frequency: 2x for 8 weeks  Plan of Care/Certification Expiration Date: 24      Visit Count:  5    OUTPATIENT PHYSICAL THERAPY:OP NOTE TYPE: Treatment Note 3/25/2024       Episode  }Appt Desk             Treatment Diagnosis:    Vertebrogenic low back pain  Other chronic pain  Muscle weakness (generalized)  Medical/Referring Diagnosis:  Low back pain, unspecified [M54.50]  Referring Physician:  Paulo Leslie MD MD Orders:  PT Eval and Treat    Date of Onset:  Onset Date: 24 (Acute on chronic irritation)     Allergies:   Hydrocodone, Codeine, Oxycodone, Zolpidem, and Propofol  Restrictions/Precautions:  No data recordedNo data recorded   Interventions Planned (Treatment may consist of any combination of the following):    No data recorded   >Subjective Comments:  Sherif states his back is feeling improved all weekend. He has been at a4.5 level pain and doing his exercises.     >Initial:     4/10>Post Session:       3/10  Medications Last Reviewed:  3/25/2024  Updated Objective Findings:    Lumbar flexion ROM greatly improved with improved speed of movement and minimal pain reported  QL trigger points on L   Treatment   MANUAL THERAPY: (15 minutes):   Joint mobilization and Soft tissue mobilization was utilized and necessary because of the patient's restricted joint motion, painful spasm, and loss of articular motion.   Date: 24    L1/2 L4/5 L5/S1 gapping mjm grade 3/4 B   S/l lumbar distraction   S/l lumbar stm/mfr    Deferred   B hip PROM ER holds   B hip LAD mjm grade 3/4     THERAPEUTIC EXERCISE: (40 minutes):    Exercises per grid below to improve

## 2024-03-27 ENCOUNTER — HOSPITAL ENCOUNTER (OUTPATIENT)
Dept: PHYSICAL THERAPY | Age: 74
Setting detail: RECURRING SERIES
Discharge: HOME | End: 2024-03-30
Payer: MEDICARE

## 2024-03-27 PROCEDURE — 97140 MANUAL THERAPY 1/> REGIONS: CPT

## 2024-03-27 PROCEDURE — 97110 THERAPEUTIC EXERCISES: CPT

## 2024-03-27 ASSESSMENT — PAIN SCALES - GENERAL: PAINLEVEL_OUTOF10: 4

## 2024-03-27 NOTE — PROGRESS NOTES
Dionisio Waggoner  : 1950  Primary: Medicare Part A [place] B (Medicare)  Secondary: MUTUAL [place] MEDICARE SUPP [facility] @ [place] [address] [address] [place] [address]  Phone: [phone]  Fax: [phone] Plan Frequency: 2x for 8 weeks    Plan of Care/Certification Expiration Date: 24      >PT Visit Info:  Plan Frequency: 2x for 8 weeks  Plan of Care/Certification Expiration Date: 24  Progress Note Counter: 5      Visit Count:  6    OUTPATIENT PHYSICAL THERAPY:OP NOTE TYPE: Treatment Note 3/27/2024       Episode  }Appt Desk             Treatment Diagnosis:    Vertebrogenic low back pain  Other chronic pain  Muscle weakness (generalized)  Medical/Referring Diagnosis:  Low back pain, unspecified [M54.50]  Referring Physician:  Paulo Leslie MD MD Orders:  PT Eval and Treat    Date of Onset:  Onset Date: 24 (Acute on chronic irritation)     Allergies:   Hydrocodone, Codeine, Oxycodone, Zolpidem, and Propofol  Restrictions/Precautions:  No data recordedNo data recorded   Interventions Planned (Treatment may consist of any combination of the following):    No data recorded   >Subjective Comments:  Sherif states he felt well after last session, he has some L QL and oblique pain this morning since he woke up. Exercises did not change it a lot     >Initial:     4/10>Post Session:       2/10  Medications Last Reviewed:  3/27/2024  Updated Objective Findings:    Lumbar flexion ROM greatly improved with improved speed of movement and minimal pain reported  QL trigger points on L   Treatment   MANUAL THERAPY: (15 minutes):   Joint mobilization and Soft tissue mobilization was utilized and necessary because of the patient's restricted joint motion, painful spasm, and loss of articular motion.   Date: 24    L1/2 L4/5 L5/S1 gapping mjm grade 3/4 B   S/l lumbar distraction   S/l lumbar QL, and iliac crest region stm/mfr    Deferred   B hip PROM ER holds   B hip LAD mjm

## 2024-03-28 ENCOUNTER — APPOINTMENT (RX ONLY)
Dept: URBAN - METROPOLITAN AREA CLINIC 329 | Facility: CLINIC | Age: 74
Setting detail: DERMATOLOGY
End: 2024-03-28

## 2024-03-28 DIAGNOSIS — B35.4 TINEA CORPORIS: ICD-10-CM | Status: WELL CONTROLLED

## 2024-03-28 PROCEDURE — ? COUNSELING

## 2024-03-28 PROCEDURE — ? FULL BODY SKIN EXAM - DECLINED

## 2024-03-28 PROCEDURE — ? PRESCRIPTION

## 2024-03-28 PROCEDURE — ? PRESCRIPTION MEDICATION MANAGEMENT

## 2024-03-28 PROCEDURE — 99213 OFFICE O/P EST LOW 20 MIN: CPT

## 2024-03-28 RX ORDER — FLUTICASONE PROPIONATE 0.05 MG/G
OINTMENT TOPICAL
Qty: 60 | Refills: 3 | Status: ERX | COMMUNITY
Start: 2024-03-28

## 2024-03-28 RX ADMIN — FLUTICASONE PROPIONATE: 0.05 OINTMENT TOPICAL at 00:00

## 2024-03-28 ASSESSMENT — LOCATION DETAILED DESCRIPTION DERM
LOCATION DETAILED: LEFT POSTERIOR SHOULDER
LOCATION DETAILED: LEFT LATERAL UPPER BACK

## 2024-03-28 ASSESSMENT — LOCATION ZONE DERM
LOCATION ZONE: TRUNK
LOCATION ZONE: ARM

## 2024-03-28 ASSESSMENT — LOCATION SIMPLE DESCRIPTION DERM
LOCATION SIMPLE: LEFT UPPER BACK
LOCATION SIMPLE: LEFT SHOULDER

## 2024-03-28 NOTE — PROCEDURE: PRESCRIPTION MEDICATION MANAGEMENT
Plan: I explained that there does not appear to be any fungus infection left on his feet and that the burning sensation may be due to a type of neuropathy and to follow up with his PCP, I further prescribed fluticasone ointment to aid with the burning sensation until then.
Detail Level: Zone
Render In Strict Bullet Format?: No
Discontinue Regimen: Ketoconazole shampoo to use as body wash as directed\\nTriamcinolone cream 0.1% bid x 2 wks then as needed for flares.\\nTerbinafine
Initiate Treatment: fluticasone propionate 0.005 % topical ointment \\nApply to affected areas on feet once to twice daily

## 2024-04-01 ENCOUNTER — HOSPITAL ENCOUNTER (OUTPATIENT)
Dept: PHYSICAL THERAPY | Age: 74
Setting detail: RECURRING SERIES
Discharge: HOME | End: 2024-04-04
Payer: MEDICARE

## 2024-04-01 PROCEDURE — 97140 MANUAL THERAPY 1/> REGIONS: CPT

## 2024-04-01 PROCEDURE — 97110 THERAPEUTIC EXERCISES: CPT

## 2024-04-01 ASSESSMENT — PAIN SCALES - GENERAL: PAINLEVEL_OUTOF10: 3

## 2024-04-01 NOTE — PROGRESS NOTES
Dionisio Waggoner  : 1950  Primary: Medicare Part A [place] B (Medicare)  Secondary: MUTUAL [place] MEDICARE SUPP [facility] @ [place] [address] [address] [place] [address]  Phone: [phone]  Fax: [phone] Plan Frequency: 2x for 8 weeks    Plan of Care/Certification Expiration Date: 24      >PT Visit Info:  Plan Frequency: 2x for 8 weeks  Plan of Care/Certification Expiration Date: 24  Progress Note Counter: 5      Visit Count:  7    OUTPATIENT PHYSICAL THERAPY:OP NOTE TYPE: Treatment Note 2024       Episode  }Appt Desk             Treatment Diagnosis:    Vertebrogenic low back pain  Other chronic pain  Muscle weakness (generalized)  Medical/Referring Diagnosis:  Low back pain, unspecified [M54.50]  Referring Physician:  Paulo Leslie MD MD Orders:  PT Eval and Treat    Date of Onset:  Onset Date: 24 (Acute on chronic irritation)     Allergies:   Hydrocodone, Codeine, Oxycodone, Zolpidem, and Propofol  Restrictions/Precautions:  No data recordedNo data recorded   Interventions Planned (Treatment may consist of any combination of the following):    No data recorded   >Subjective Comments:  Sherif reported he is doing overall better, he notes difficulty most with lateral bending and twisting like when picking things up off the ground or helping others at [employer]. Pain seems to improve with sitting.     >Initial:     3/10>Post Session:       2/10  Medications Last Reviewed:  2024  Updated Objective Findings:    Lumbar flexion ROM greatly improved with improved speed of movement and minimal pain reported  QL trigger points on L   Treatment   MANUAL THERAPY: (10 minutes):   Joint mobilization and Soft tissue mobilization was utilized and necessary because of the patient's restricted joint motion, painful spasm, and loss of articular motion.   Date: 24    L1/2 L4/5 L5/S1 gapping mjm grade 3/4 B   S/l lumbar distraction   S/l lumbar QL, and iliac crest

## 2024-04-03 ENCOUNTER — HOSPITAL ENCOUNTER (OUTPATIENT)
Dept: PHYSICAL THERAPY | Age: 74
Setting detail: RECURRING SERIES
Discharge: HOME | End: 2024-04-06
Payer: MEDICARE

## 2024-04-03 PROCEDURE — 97140 MANUAL THERAPY 1/> REGIONS: CPT

## 2024-04-03 PROCEDURE — 97110 THERAPEUTIC EXERCISES: CPT

## 2024-04-03 ASSESSMENT — PAIN SCALES - GENERAL: PAINLEVEL_OUTOF10: 3

## 2024-04-03 NOTE — PROGRESS NOTES
3/4     THERAPEUTIC EXERCISE: (40 minutes):    Exercises per grid below to improve mobility, strength, and balance.  Required minimal verbal and manual cues to promote proper body alignment, promote proper body posture, and promote proper body mechanics.  Progressed resistance, range, and repetitions as indicated.    Date: 04/03/24    LTR x2 min   Open books x15 reps B   Clam shells x10\" to fatigue x8 reps   Supine counter curve lock flexion and abduction with towel roll x14 reps each   Supine ppt x10\" x8 reps - cue for LE relaxation  Seated hip hinge with foam for ppt cue x6 reps - struggles without lumbar extension   Supine BKFO to fatigue- struggles to L  Bridges x10 reps - cue for hip extension   Seated thoracic butterflies x15 reps   Seated crunches 2x6 reps B, added cues for thoracic ext control - struggles today without sxs   STS yellow ball x6 reps - fatigued, sxs in lumbar today   Supine elbow to knee yoga block x5\" x6 reps B - fatigued, cues to avoid bearing down     Deferred   Staggered stance rows green TB x12 reps B   Seated rows with upright posture and ppt blue band x10 reps B   Wall slides x10 reps   Wall posture for ppt and thoracic extension x10\" x8 reps - improved control today       Treatment/Session Summary:    >Treatment Assessment:  Sherif did well today, he was limited in unsupported sitting control. Plan to progress functional strength and control of core and lumbar spine to minimize compensation and pain.     Communication/Consultation:  None today  Equipment provided today:  HEP provided   Recommendations/Intent for next treatment session: Next visit will focus on Thoracic, lumbar and SI mobility with trunk stability and strength training.    >Total Treatment Billable Duration:  53 minutes  Time In: 1134  Time Out: 1230    DWAIN VÁSQUEZ, PT       Charge Capture  }Post Session Pain  PT Visit Info  [employer] Portal  MD Guidelines  Scanned Media  Benefits  MyChart    Future Appointments

## 2024-04-04 ENCOUNTER — APPOINTMENT (OUTPATIENT)
Dept: PHYSICAL THERAPY | Age: 74
End: 2024-04-04
Payer: MEDICARE

## 2024-04-08 ENCOUNTER — HOSPITAL ENCOUNTER (OUTPATIENT)
Dept: PHYSICAL THERAPY | Age: 74
Setting detail: RECURRING SERIES
End: 2024-04-08
Payer: MEDICARE

## 2024-04-08 ENCOUNTER — OFFICE VISIT (OUTPATIENT)
Dept: INTERNAL MEDICINE CLINIC | Facility: CLINIC | Age: 74
End: 2024-04-08
Payer: MEDICARE

## 2024-04-08 VITALS
BODY MASS INDEX: 26.81 KG/M2 | DIASTOLIC BLOOD PRESSURE: 58 MMHG | HEIGHT: 67 IN | HEART RATE: 77 BPM | SYSTOLIC BLOOD PRESSURE: 118 MMHG | WEIGHT: 170.8 LBS

## 2024-04-08 DIAGNOSIS — I65.22 STENOSIS OF LEFT CAROTID ARTERY: ICD-10-CM

## 2024-04-08 DIAGNOSIS — I25.10 CAD S/P PERCUTANEOUS CORONARY ANGIOPLASTY: Primary | ICD-10-CM

## 2024-04-08 DIAGNOSIS — I10 HYPERTENSION, ESSENTIAL: ICD-10-CM

## 2024-04-08 DIAGNOSIS — M54.50 CHRONIC BILATERAL LOW BACK PAIN WITHOUT SCIATICA: ICD-10-CM

## 2024-04-08 DIAGNOSIS — R91.1 LUNG NODULE: ICD-10-CM

## 2024-04-08 DIAGNOSIS — C61 PROSTATE CANCER (HCC): ICD-10-CM

## 2024-04-08 DIAGNOSIS — F51.04 CHRONIC INSOMNIA: ICD-10-CM

## 2024-04-08 DIAGNOSIS — E78.2 HYPERLIPIDEMIA, MIXED: ICD-10-CM

## 2024-04-08 DIAGNOSIS — N18.31 CHRONIC KIDNEY DISEASE, STAGE 3A (HCC): ICD-10-CM

## 2024-04-08 DIAGNOSIS — G89.29 CHRONIC BILATERAL LOW BACK PAIN WITHOUT SCIATICA: ICD-10-CM

## 2024-04-08 DIAGNOSIS — Z98.61 CAD S/P PERCUTANEOUS CORONARY ANGIOPLASTY: Primary | ICD-10-CM

## 2024-04-08 DIAGNOSIS — N28.1 RENAL CYST, RIGHT: ICD-10-CM

## 2024-04-08 DIAGNOSIS — I50.22 CHRONIC HFREF (HEART FAILURE WITH REDUCED EJECTION FRACTION) (HCC): ICD-10-CM

## 2024-04-08 PROCEDURE — 99214 OFFICE O/P EST MOD 30 MIN: CPT | Performed by: INTERNAL MEDICINE

## 2024-04-08 PROCEDURE — 3017F COLORECTAL CA SCREEN DOC REV: CPT | Performed by: INTERNAL MEDICINE

## 2024-04-08 PROCEDURE — 1123F ACP DISCUSS/DSCN MKR DOCD: CPT | Performed by: INTERNAL MEDICINE

## 2024-04-08 PROCEDURE — 3074F SYST BP LT 130 MM HG: CPT | Performed by: INTERNAL MEDICINE

## 2024-04-08 PROCEDURE — 1036F TOBACCO NON-USER: CPT | Performed by: INTERNAL MEDICINE

## 2024-04-08 PROCEDURE — G2211 COMPLEX E/M VISIT ADD ON: HCPCS | Performed by: INTERNAL MEDICINE

## 2024-04-08 PROCEDURE — G8417 CALC BMI ABV UP PARAM F/U: HCPCS | Performed by: INTERNAL MEDICINE

## 2024-04-08 PROCEDURE — 3078F DIAST BP <80 MM HG: CPT | Performed by: INTERNAL MEDICINE

## 2024-04-08 PROCEDURE — G8428 CUR MEDS NOT DOCUMENT: HCPCS | Performed by: INTERNAL MEDICINE

## 2024-04-08 RX ORDER — TRAZODONE HYDROCHLORIDE 100 MG/1
100 TABLET ORAL NIGHTLY PRN
Qty: 90 TABLET | Refills: 1 | Status: SHIPPED | OUTPATIENT
Start: 2024-04-08

## 2024-04-08 RX ORDER — CLOPIDOGREL BISULFATE 75 MG/1
75 TABLET ORAL DAILY
COMMUNITY

## 2024-04-08 RX ORDER — TRAMADOL HYDROCHLORIDE 50 MG/1
50 TABLET ORAL EVERY 8 HOURS PRN
Qty: 90 TABLET | Refills: 2 | Status: SHIPPED | OUTPATIENT
Start: 2024-04-08 | End: 2025-04-08

## 2024-04-08 ASSESSMENT — ENCOUNTER SYMPTOMS
EYE PAIN: 0
STRIDOR: 0
RECTAL PAIN: 0
VOICE CHANGE: 0

## 2024-04-08 NOTE — PROGRESS NOTES
Dionisio Waggoner  : 1950  Primary: Medicare Part A And B  Secondary: MUTUAL [place] MEDICARE SUPP [facility] @ [place] [address] [place] [address]  Phone: [phone]  Fax: [phone]    PT Visit Info:    Progress Note Counter: 5     OT Visit Info:  No data recorded    OUTPATIENT THERAPY: 2024  Episode  Appt Desk        Dionisio Waggoner cancelled his appointment for today due to conflicting appointment.  Will plan to follow up next during next appointment.  Thank you,  BREN VÁSQUEZ, PT    Future Appointments   Date Time Provider Department Center   2024  7:00 PM Bren Vásquez, PT [facility]   4/10/2024 11:30 AM Bren Vásquez, PT [facility]   4/15/2024  3:30 PM Bren Vásquez, PT [facility]   2024  3:30 PM Bren Vásquez, PT [facility]   2024  3:30 PM Bren Vásquez, PT [facility]   2024  1:00 PM Nikolai Laguerre MD PM [facility]   2024  9:20 AM Paulo Leslie MD [facility]

## 2024-04-08 NOTE — PROGRESS NOTES
FOLLOWUP VISIT    Subjective:    Mr. Waggoner is a 74 y.o., male,   Chief Complaint   Patient presents with    3 Month Follow-Up    Medication Refill       HPI:    Patient presents today for follow up of two or more chronic medical problems and review of labs.       The patient has coronary artery disease.  The patient has been attempting to follow a heart healthy diet and exercise.  The patient denies chest pain, shortness of breath, dyspnea on exertion, or PND.   He had stents placed [facility] last [facility]. Cath report reviewed.      The patient has hypertension.  The patient has been on an attempted low sodium diet and has been trying to exercise and maintain a healthy weight.  The patient reports good compliance with the blood pressure medications.       The patient has hyperlipidemia.  The patient has been following a low cholesterol diet and denies any myalgias or weakness on current lipid lowering therapy.       He has CKD.  Renal function has been stable.      He has a left renal cyst.  He has not yet scheduled renal US.    The following portions of the patient's history were reviewed and updated as appropriate:      Past Medical History:   Diagnosis Date    AION (acute ischemic optic neuropathy), bilateral     with residual right central vision loss    Anemia     Anxiety     CAD (coronary artery disease)     S/P percutaneous coronary angioplasty    Carotid artery disease (HCC)     Chronic HFrEF (heart failure with reduced ejection fraction) (HCC)     CKD (chronic kidney disease)     Former smoker, stopped smoking in distant past     Quit 1971 after 1 ppd x 3 years.    Hepatitis A 1980    History of coccidioidomycosis     \"valley fever\"    History of non-ST elevation myocardial infarction (NSTEMI) 10/2020    S/P stent x 2    Hypercholesterolemia     Hypertension     Prostate cancer (HCC)     S/P prostatectomy in 2009    Sinus bradycardia, chronic     Wears hearing aid in both ears        Past Surgical History:

## 2024-04-10 ENCOUNTER — HOSPITAL ENCOUNTER (OUTPATIENT)
Dept: PHYSICAL THERAPY | Age: 74
Setting detail: RECURRING SERIES
Discharge: HOME | End: 2024-04-13
Payer: MEDICARE

## 2024-04-10 PROCEDURE — 97140 MANUAL THERAPY 1/> REGIONS: CPT

## 2024-04-10 PROCEDURE — 97110 THERAPEUTIC EXERCISES: CPT

## 2024-04-10 ASSESSMENT — PAIN SCALES - GENERAL: PAINLEVEL_OUTOF10: 4

## 2024-04-10 NOTE — PROGRESS NOTES
Date Time Provider Department [place]   4/15/2024  3:30 PM Bren Black, PT [facility]   4/17/2024  3:30 PM Bren Black, PT [facility]   4/22/2024  3:30 PM Bren Black, PT [facility]   4/30/2024  1:00 PM Nikolai Laguerre MD PM [facility]   5/1/2024 10:30 AM Christopher Goldman, PT [facility]   5/8/2024 11:30 AM Christopher Goldman, PT [facility]   6/26/2024  1:30 PM [facility] US 1 [facility]   7/8/2024  9:20 AM Paulo Leslie MD [facility] [place] [facility]

## 2024-04-15 ENCOUNTER — HOSPITAL ENCOUNTER (OUTPATIENT)
Dept: PHYSICAL THERAPY | Age: 74
Setting detail: RECURRING SERIES
End: 2024-04-15
Payer: MEDICARE

## 2024-04-15 NOTE — PROGRESS NOTES
Dionisio Waggoner  : 1950  Primary: Medicare Part A [place] B  Secondary: MUTUAL [place] MEDICARE SUPP [facility] @ [place] [address] [address] [place] [address]  Phone: [phone]  Fax: [phone]    PT Visit Info:    Progress Note Counter: 9     OT Visit Info:  No data recorded    OUTPATIENT THERAPY: 4/15/2024  Episode  Appt Desk        Dionisio Waggoner cancelled his appointment for today due to  [place] [employer] .  Will plan to follow up next during next appointment.  Thank you,  BREN VÁSQUEZ, PT    Future Appointments   Date Time Provider Department Center   4/15/2024  3:30 PM Bren Vásquez, PT [facility]   2024  3:30 PM SofiaBren martinez, PT [facility]   2024  3:30 PM SofiaBerkley martinezbeth, PT [facility]   2024  1:00 PM Nikolai Laguerre MD PM [facility]   2024 10:30 AM Christopher Goldman, PT [facility]   2024 11:30 AM Christopher Goldman, PT [facility]   2024 12:30 PM SofiaBren francisco, PT [facility]   5/15/2024 10:30 AM SofiaBerkley martinezbeth, PT [facility]   2024 12:30 PM SofiaBerkley franciscobeth, PT [facility]   2024 10:30 AM SofiaBerkley martinezbeth, PT [facility]   2024  1:30 PM [facility] US 1 [facility]   2024  9:20 AM Paulo Leslie MD [facility]

## 2024-04-17 ENCOUNTER — HOSPITAL ENCOUNTER (OUTPATIENT)
Dept: PHYSICAL THERAPY | Age: 74
Setting detail: RECURRING SERIES
Discharge: HOME | End: 2024-04-20
Payer: MEDICARE

## 2024-04-17 PROCEDURE — 97110 THERAPEUTIC EXERCISES: CPT

## 2024-04-17 NOTE — PROGRESS NOTES
Dionisio Waggoner  : 1950  Primary: Medicare Part A And B (Medicare)  Secondary: MUTUAL [place] MEDICARE SUPP [facility] [place]  [place] [address]  Phone: [phone]  Fax: [phone] Plan Frequency: 2x for 8 weeks    Plan of Care/Certification Expiration Date: 24        Plan of Care/Certification Expiration Date:  Plan of Care/Certification Expiration Date: 24    Frequency/Duration: Plan Frequency: 2x for 8 weeks      Time In/Out:   Time In: 1550  Time Out: 1630      PT Visit Info:    Progress Note Counter: 10      Visit Count:  10                OUTPATIENT PHYSICAL THERAPY:             Progress Report 2024               Episode (Low Back Pain)         Treatment Diagnosis:     Vertebrogenic low back pain  Other chronic pain  Muscle weakness (generalized)  Medical/Referring Diagnosis:    Chronic bilateral low back pain without sciatica    Referring Physician:  Paulo Leslie MD MD Orders:  PT Eval and Treat    Return MD Appt:  6/3/24  Date of Onset:  Onset Date: 24 (Acute on chronic irritation)      Allergies:  Hydrocodone, Codeine, Oxycodone, Zolpidem, and Propofol  Restrictions/Precautions:    Cardiac concerns with bradycardia, recent cardiac stent placed       Medications Last Reviewed:  2024     SUBJECTIVE   History of Injury/Illness (Reason for Referral):  Progress Note:   Sherif reports a 75% improvement in chronic low back pain over 10 visits of physical therapy, stating his pain has drastically decreased and he is able to move around more and perform ADL's with much less pain. He is still aware of the pain and feels PT is continuing to help relieve his symptoms. He would like to continue care to minimize chronic pain, improve his overall strength and endurance and return fully to [facility] since his cardiac diagnoses and back pain irritation. Pt reports he fell on  when tripping over a curb at [place], he was able to

## 2024-04-17 NOTE — PROGRESS NOTES
Dionisio Waggoner  : 1950  Primary: Medicare Part A And B (Medicare)  Secondary: MUTUAL [place] MEDICARE SUPP [facility] @ [place] [address] [address] [place] [address]  Phone: [phone]  Fax: [phone] Plan Frequency: 2x for 8 weeks    Plan of Care/Certification Expiration Date: 24      >PT Visit Info:  Plan Frequency: 2x for 8 weeks  Plan of Care/Certification Expiration Date: 24  Progress Note Counter: 10      Visit Count:  10    OUTPATIENT PHYSICAL THERAPY:OP NOTE TYPE: Treatment Note 2024       Episode  }Appt Desk             Treatment Diagnosis:    Vertebrogenic low back pain  Other chronic pain  Muscle weakness (generalized)  Medical/Referring Diagnosis:  Low back pain, unspecified [M54.50]  Referring Physician:  Paulo Leslie MD MD Orders:  PT Eval and Treat    Date of Onset:  Onset Date: 24 (Acute on chronic irritation)     Allergies:   Hydrocodone, Codeine, Oxycodone, Zolpidem, and Propofol  Restrictions/Precautions:  No data recordedNo data recorded   Interventions Planned (Treatment may consist of any combination of the following):    No data recorded   >Subjective Comments:  Sherif reports a 75% improvement in pain and function, see PN. His pain is only a 1.5/10 today and he is very pleased. Pt arrived 20 minutes late due to a meeting he was in.     >Initial:     1/10>Post Session:       1/10  Medications Last Reviewed:  2024  Updated Objective Findings:    Lumbar flexion ROM greatly improved with improved speed of movement and minimal pain reported     Treatment   MANUAL THERAPY: (0 minutes):   Joint mobilization and Soft tissue mobilization was utilized and necessary because of the patient's restricted joint motion, painful spasm, and loss of articular motion.   Date: 24    Deferred   L1/2 L4/5 L5/S1 gapping mjm grade 3/4 B   S/l lumbar distraction R   S/l L QL stm/mfr B hip PROM ER holds   B hip LAD mjm grade 3/4

## 2024-04-18 ASSESSMENT — PAIN SCALES - GENERAL: PAINLEVEL_OUTOF10: 1

## 2024-04-22 ENCOUNTER — HOSPITAL ENCOUNTER (OUTPATIENT)
Dept: PHYSICAL THERAPY | Age: 74
Setting detail: RECURRING SERIES
Discharge: HOME | End: 2024-04-25
Payer: MEDICARE

## 2024-04-22 PROCEDURE — 97140 MANUAL THERAPY 1/> REGIONS: CPT

## 2024-04-22 PROCEDURE — 97110 THERAPEUTIC EXERCISES: CPT

## 2024-04-22 ASSESSMENT — PAIN SCALES - GENERAL: PAINLEVEL_OUTOF10: 2

## 2024-04-22 NOTE — PROGRESS NOTES
Exercises per grid below to improve mobility, strength, and balance.  Required minimal verbal and manual cues to promote proper body alignment, promote proper body posture, and promote proper body mechanics.  Progressed resistance, range, and repetitions as indicated.    Date: 04/22/24    LTR x2 min   Open books x15 reps L   Clam shells x10\" to fatigue x8 reps   Seated hip hinge 8.8# KB x10 reps   Bridges x15 reps - cue for hip extension   Seated thoracic butterflies x15 reps   Seated crunches 2x6 reps B, added cues for thoracic ext control - struggles today without sxs   STS 8.8# KB x10 reps no pain; x10 reps no weight for endurance   Wall hip hinge with 8.8# KBx10  reps - cues for thoracic ext   Supine elbow to knee yoga block x5\" x6 reps B - fatigued, cues to avoid bearing down   6\" step ups x15 reps B at quick pace for cardio response   6\" static stance with opposite leg march x10 reps B AP and laterally     Deferred   Squat paloff press green TB x12 reps B   Supine BKFO to fatigue- struggles to L  Staggered stance rows green TB x12 reps B   Supine counter curve lock flexion and abduction with towel roll x14 reps each   Wall slides x10 reps   Wall posture for ppt and thoracic extension x10\" x8 reps - improved control today       Treatment/Session Summary:    >Treatment Assessment:  Sherif did well today, he require sitting rest to recover breathing and heart rate. Pt encouraged to attend cardiac rehab. Plan to progress core and LE strength, endurance , and posture control to minimize shear forces of the lumbar spine.     Communication/Consultation:  None today  Equipment provided today:  HEP provided   Recommendations/Intent for next treatment session: Next visit will focus on Thoracic, lumbar and SI mobility with trunk stability and strength training.    >Total Treatment Billable Duration:  53 total minutes  Time In: 1532  Time Out: 1630    DWAIN VÁSQUEZ PT       Charge Capture  }Post Session Pain  PT

## 2024-04-30 ENCOUNTER — OFFICE VISIT (OUTPATIENT)
Age: 74
End: 2024-04-30
Payer: MEDICARE

## 2024-04-30 DIAGNOSIS — M47.817 LUMBOSACRAL SPONDYLOSIS WITHOUT MYELOPATHY: Primary | ICD-10-CM

## 2024-04-30 PROCEDURE — 99204 OFFICE O/P NEW MOD 45 MIN: CPT | Performed by: ANESTHESIOLOGY

## 2024-04-30 PROCEDURE — 1123F ACP DISCUSS/DSCN MKR DOCD: CPT | Performed by: ANESTHESIOLOGY

## 2024-04-30 RX ORDER — CYCLOBENZAPRINE HCL 5 MG
5 TABLET ORAL 3 TIMES DAILY PRN
Qty: 90 TABLET | Refills: 1 | Status: SHIPPED | OUTPATIENT
Start: 2024-04-30 | End: 2024-06-29

## 2024-04-30 RX ORDER — ASPIRIN 81 MG/1
81 TABLET ORAL DAILY
COMMUNITY

## 2024-04-30 NOTE — PROGRESS NOTES
therapies:  Type of Therapy Date Results   Physical Therapy 3/12/24 - current                                      
L1-L2 with associated endplate degenerative changes and bilateral facet arthropathy at this level with facet joint synovial thickening and enhancement favored related to reactive change although synovial facet joint  infection/septic synovial facet joints cannot be entirely excluded this is felt fairly unlikely. No evidence of osteomyelitis or discitis. No evidence of epidural abscess  2. Enhancing focus seen only on postcontrast sagittal image 8 with a 4 mm round lesion seen on preceding spine MRI images of uncertain etiology and uncertain positioning, uncertain if this is intradural or extradural on provided images. Unfortunately  axial images were not performed at this level. Recommend follow-up lumbar spine MRI with and without contrast with thin axial images performed through the lumbar spine at this level.  3. Severe thecal sac narrowing and moderate spinal canal stenosis at L1-L2 with moderate left neural foraminal stenosis at this level.  4. Moderate spinal canal stenosis at L3-L4.  5. Right paracentral disc protrusion at L4-L5 with mass effect upon the descending right L5 nerve with moderate to severe spinal canal stenosis and severe thecal sac narrowing at this level with mild to moderate bilateral neuroforaminal stenosis at  L4-L5.  6. Moderate left neuroforaminal stenosis at L5-S1.   XR LUMBAR 2/20/24    IMPRESSION:  1. No evidence of acute fracture.  2. Advanced degenerative disc changes at L1-L2, L3-L4, and L5-S1.                                              Previous therapies:  Type of Therapy Date Results   Physical Therapy 3/12/24 - current, over 12 sessions Mild benefit                                                    Opioid Monitoring:  Last UDS (results): N/A (N/A) Opioid agreement signed: N/A  [facility] database: evaluated today, appropriate Compliance issues: N/A  Last opioid dose change: N/A    Opioid Risk Tool Score (low/mod/high}:    Opioid Risk Tool Female I Male   Family history of substance

## 2024-04-30 NOTE — PROGRESS NOTES
Patient received to [address] [place] [address] room # 10  Ambulatory from [facility]. Patient scheduled for [facility] today with Dr Tamie Ferguson. Procedure reviewed & questions answered, voiced good understanding consent obtained & placed on chart. All medications and medical history reviewed. Will prep patient per orders. Patient & family updated on plan of care. The patient has a fraility score of 3-MANAGING WELL, based on ability to perform ADLs independently. Pt took ASA 81mg x 4 and plavix 75mg at 0430. full range of motion in all extremities

## 2024-05-01 ENCOUNTER — HOSPITAL ENCOUNTER (OUTPATIENT)
Dept: PHYSICAL THERAPY | Age: 74
Setting detail: RECURRING SERIES
Discharge: HOME | End: 2024-05-04
Payer: MEDICARE

## 2024-05-01 PROCEDURE — 97140 MANUAL THERAPY 1/> REGIONS: CPT

## 2024-05-01 PROCEDURE — 97110 THERAPEUTIC EXERCISES: CPT

## 2024-05-01 ASSESSMENT — PAIN SCALES - GENERAL: PAINLEVEL_OUTOF10: 4

## 2024-05-01 NOTE — PROGRESS NOTES
Dionisio Waggoner  : 1950  Primary: Medicare Part A [place] B (Medicare)  Secondary: MUTUAL [place] MEDICARE SUPP [facility] [place]  [place] [address]  Phone: [phone]  Fax: [phone] Plan Frequency: 2x for 8 weeks    Plan of Care/Certification Expiration Date: 24      >PT Visit Info:  Plan Frequency: 2x for 8 weeks  Plan of Care/Certification Expiration Date: 24  Progress Note Counter: 2      Visit Count:  12    OUTPATIENT PHYSICAL THERAPY:OP NOTE TYPE: Treatment Note 2024       Episode  }Appt Desk             Treatment Diagnosis:    Vertebrogenic low back pain  Other chronic pain  Muscle weakness (generalized)  Medical/Referring Diagnosis:  Low back pain, unspecified [M54.50]  Referring Physician:  Paulo Leslie MD MD Orders:  PT Eval and Treat    Date of Onset:  Onset Date: 24 (Acute on chronic irritation)     Allergies:   Hydrocodone, Codeine, Oxycodone, Zolpidem, and Propofol  Restrictions/Precautions:  No data recordedNo data recorded   Interventions Planned (Treatment may consist of any combination of the following):    No data recorded   >Subjective Comments:  Sherif reports he is sore today. He's not sure if it's from going to the MD yesterday or packing up his house. He's trying not to do too much lifting at home. He is scheduled to have spinal injections on .     >Initial:     4/10>Post Session:       4/10  Medications Last Reviewed:  2024  Updated Objective Findings:    Lumbar flexion ROM greatly improved with improved speed of movement and minimal pain reported     Treatment   MANUAL THERAPY: (10 minutes):   Joint mobilization and Soft tissue mobilization was utilized and necessary because of the patient's restricted joint motion, painful spasm, and loss of articular motion.   Date: 24  L1/2 L4/5 L5/S1 gapping mjm grade 3/4 B   S/l lumbar distraction R   Deferred     S/l L QL stm/mfr   B hip PROM ER holds   B

## 2024-05-08 ENCOUNTER — HOSPITAL ENCOUNTER (OUTPATIENT)
Dept: PHYSICAL THERAPY | Age: 74
Setting detail: RECURRING SERIES
End: 2024-05-08
Payer: MEDICARE

## 2024-05-08 NOTE — PROGRESS NOTES
Dionisio Galvanton  : 1950  Primary: Medicare Part A And B  Secondary: MUTUAL [place] MEDICARE SUPP [facility] @ [place] [address] [address]  [facility] [address]  Phone: [phone]  Fax: [phone]       2024      Patient canceled today's appointment due to closing on his house and will plan to follow up at next scheduled visit.       Christopher Goldman, PT

## 2024-05-13 ENCOUNTER — HOSPITAL ENCOUNTER (OUTPATIENT)
Dept: PHYSICAL THERAPY | Age: 74
Setting detail: RECURRING SERIES
Discharge: HOME | End: 2024-05-16
Payer: MEDICARE

## 2024-05-13 PROCEDURE — 97110 THERAPEUTIC EXERCISES: CPT

## 2024-05-13 PROCEDURE — 97140 MANUAL THERAPY 1/> REGIONS: CPT

## 2024-05-13 ASSESSMENT — PAIN SCALES - GENERAL: PAINLEVEL_OUTOF10: 3

## 2024-05-13 NOTE — PROGRESS NOTES
Treatment Billable Duration:  53 total minutes  Time In: 1330  Time Out: 1427    BREN VÁSQUEZ, PT       Charge Capture  }Post Session Pain  PT Visit Info  MedBridge Portal  MD Guidelines  Scanned Media  Benefits  MyChart    Future Appointments   Date Time Provider Department Center   5/15/2024 10:30 AM Bren Vásquez, PT [facility]   5/16/2024  9:30 AM PAIN MGMT PROVIDER [facility]   5/20/2024 12:30 PM Bren Vásquez, PT [facility]   5/22/2024 10:30 AM Bren Vásquez, PT [facility]   6/26/2024  1:30 PM SFS US 1 [facility]   7/8/2024  9:20 AM Paulo Leslie MD [facility]

## 2024-05-14 NOTE — PROGRESS NOTES
Procedure Date: May 14, 2024      Location: [facility] PAIN [facility]       Procedure: bilateral L4 through SA MBB'        Time Out performed prior to start of the procedure:       Nikolai Laguerre M.D.  performed the following reviews on Dionisio Waggoner 1950 prior to the start of the procedure:       patient was identified by name and     agreement on procedure being performed was verified   risks and benefits explained to patient by the provider  procedure site verified as Bilateral  patient was positioned for comfort   consent singed and verified for procedure       Time:  9:06 AM        Procedure performed by:   Nikolai Laguerre M.D.       Patient assisted by:   JODEE HOOKER MA

## 2024-05-15 ENCOUNTER — HOSPITAL ENCOUNTER (OUTPATIENT)
Dept: PHYSICAL THERAPY | Age: 74
Setting detail: RECURRING SERIES
Discharge: HOME | End: 2024-05-18
Payer: MEDICARE

## 2024-05-15 PROCEDURE — 97110 THERAPEUTIC EXERCISES: CPT

## 2024-05-15 PROCEDURE — 97140 MANUAL THERAPY 1/> REGIONS: CPT

## 2024-05-15 ASSESSMENT — PAIN SCALES - GENERAL: PAINLEVEL_OUTOF10: 3

## 2024-05-15 NOTE — PROGRESS NOTES
gapping mjm grade 3/4 B   B hip LAD mjm grade 3/4     THERAPEUTIC EXERCISE: (43 minutes):    Exercises per grid below to improve mobility, strength, and balance.  Required minimal verbal and manual cues to promote proper body alignment, promote proper body posture, and promote proper body mechanics.  Progressed resistance, range, and repetitions as indicated.    Date: 05/15/24    LTR x2 min   PPTx3 min  Open books x15 reps B  Clam shells x10\" x12 reps B  Supine BKFO to fatigue x2 min   Bridges x15 reps - cue for hip extension   Supine elbow to knee yoga block x5\" x6 reps B - fatigued, cues to avoid bearing down   STS 2x10 reps no weight due to pt overall fatigue today   Dimas x15 reps B - UE use on table, pt unsteady, notable trunk lean and lumbar extension noted     Deferred   Seated thoracic butterflies x15 reps   Squat paloff press green TB x12 reps B   Seated hip hinge 8.8# KB x10 reps  Wall hip hinge with 8.8# KBx10  reps - cues for thoracic ext   Staggered stance rows green TB x12 reps B   Supine counter curve lock flexion and abduction with towel roll x14 reps each   Wall slides x10 reps   Wall posture for ppt and thoracic extension x10\" x8 reps - improved control today   6\" step ups x15 reps B at quick pace for cardio response   6\" static stance with opposite leg march x10 reps B AP and laterally   Seated crunches 2x6 reps B, added cues for thoracic ext control - struggles today without sxs       Treatment/Session Summary:    >Treatment Assessment:  Sherif did well, fatigued quickly with exercise. He requires further trunk and hip stability to minimize compensation and pain. He will have lumbar ablation tomorrow, plan to assess response.     Communication/Consultation:  None today  Equipment provided today:  None today  Recommendations/Intent for next treatment session: Next visit will focus on Thoracic, lumbar and SI mobility with trunk stability and strength training.    >Total Treatment Billable Duration:

## 2024-05-16 ENCOUNTER — OFFICE VISIT (OUTPATIENT)
Dept: ORTHOPEDIC SURGERY | Age: 74
End: 2024-05-16
Payer: MEDICARE

## 2024-05-16 DIAGNOSIS — M47.817 LUMBOSACRAL SPONDYLOSIS WITHOUT MYELOPATHY: Primary | ICD-10-CM

## 2024-05-16 PROCEDURE — 64494 INJ PARAVERT F JNT L/S 2 LEV: CPT | Performed by: ANESTHESIOLOGY

## 2024-05-16 PROCEDURE — 64493 INJ PARAVERT F JNT L/S 1 LEV: CPT | Performed by: ANESTHESIOLOGY

## 2024-05-16 NOTE — PROGRESS NOTES
Name: Dionisio Waggoner   MRN:[id number]   :1950    Location: [facility]    Procedure: Bilateral Lumbar Medial Branch Block Under Fluoroscopic Imaging, L4-L5, L5-SA Facets     Pre-op Diagnosis: 1. Lumbar Spondylosis without Myelopathy. 2. Lumbar Facet Arthropathy. 3. Low Back Pain     Post-op Diagnosis: Same    Anesthesia: Local only       Complications: None    PROCEDURE: Fluroscopically Guided Lumbar Medial Branch Blocks    After confirming written and informed consent and discussing the risk, benefits and alternatives for the procedure. The patient had the correct site marked by the physician performing the procedure. The specific risks of bleeding and infection were discussed. The patient was taken to the fluoroscopy suite. The patient was then placed in the prone position. A pulse oximeter was placed, and verbal and visual monitoring were maintained throughout the procedure. The skin overlying the lumbo-sacral spine was then prepped with chlorhexidine gluconate and a sterile drape was placed. A hat and mask were worn, and the hands were cleaned with an alcohol-containing solution. Sterile gloves were then worn. A time out was then performed involving the physician, radiation technologist and the patient.    Next, the anatomy of the lumbar spine was then identified using fluoroscopic guidance. The area of usual pain was identified using patient assistance, and was confirmed with fluoroscopy. A 22 G, 5 inch Quincke needle was used for the procedure.    An oblique view of the right L4 transverse process was then utilized to identify the junction of the junction of the transverse process and superior articulating process. The skin overlying this area was anesthetized with 0.2 ml of 1% lidocaine using a 25 G 1.5 inch needle. Next, using intermittent fluoroscopic guidance, the spinal needle was advanced toward the junction of the transverse process and superior articulating process, favoring the cephalad aspect of

## 2024-05-17 ENCOUNTER — TELEPHONE (OUTPATIENT)
Age: 74
End: 2024-05-17

## 2024-05-17 DIAGNOSIS — M47.817 LUMBOSACRAL SPONDYLOSIS WITHOUT MYELOPATHY: Primary | ICD-10-CM

## 2024-05-17 NOTE — TELEPHONE ENCOUNTER
Procedure: lumbar mbb #1    Procedure Date: 5.16.2024    Daily Average Pain Level: 5    1st Hour After Block: 1    2nd Hour After Block:  1/2    3rd Hour After Block:  1/2    4th Hour After Block:  0      Activities:  bending and lifting    Over Pain Relief for the first 4 hours:  90%    Did the block help with your daily functions?:  Yes  Were you able to walk better?:  Yes  Whatever the pain is hindering you from doing, was it better during the block:  Yes  Have you continued home exercises/stretches?:  Yes    Follow up:  mbb2

## 2024-05-20 ENCOUNTER — HOSPITAL ENCOUNTER (OUTPATIENT)
Dept: PHYSICAL THERAPY | Age: 74
Setting detail: RECURRING SERIES
Discharge: HOME | End: 2024-05-23
Payer: MEDICARE

## 2024-05-20 PROCEDURE — 97110 THERAPEUTIC EXERCISES: CPT

## 2024-05-20 PROCEDURE — 97140 MANUAL THERAPY 1/> REGIONS: CPT

## 2024-05-20 ASSESSMENT — PAIN SCALES - GENERAL: PAINLEVEL_OUTOF10: 1

## 2024-05-20 NOTE — PROGRESS NOTES
Dionisio Waggoner  : 1950  Primary: Medicare Part A [place] B (Medicare)  Secondary: MUTUAL [place] MEDICARE SUPP [facility] @ [place] [address] [address] [place] [address]  Phone: [phone]  Fax: [phone] Plan Frequency: 2x for 8 weeks    Plan of Care/Certification Expiration Date: 24      >PT Visit Info:  Plan Frequency: 2x for 8 weeks  Plan of Care/Certification Expiration Date: 24  Progress Note Counter: 5      Visit Count:  15    OUTPATIENT PHYSICAL THERAPY:OP NOTE TYPE: Treatment Note 2024       Episode  }Appt Desk             Treatment Diagnosis:    Vertebrogenic low back pain  Other chronic pain  Muscle weakness (generalized)  Medical/Referring Diagnosis:  Low back pain, unspecified [M54.50]  Referring Physician:  Paulo Leslie MD MD Orders:  PT Eval and Treat    Date of Onset:  Onset Date: 24 (Acute on chronic irritation)     Allergies:   Hydrocodone, Codeine, Oxycodone, Zolpidem, and Propofol  Restrictions/Precautions:  No data recordedNo data recorded   Interventions Planned (Treatment may consist of any combination of the following):    No data recorded   >Subjective Comments:  Sherif reported he has testing injections to determine readiness and effectiveness for ablation Friday. The shots lasted 4-6 hours and he noted a great improvement to the point he barely had any pain this weekend as he continued to lift and unpack. If he had pain, he had relief by the next morning.     >Initial:     1/10>Post Session:       1/10  Medications Last Reviewed:  2024  Updated Objective Findings:    Lumbar flexion ROM greatly improved with improved speed of movement and minimal pain reported  Paraspinal tone notably improved   Struggles with flat back control in standing and sitting     Treatment   MANUAL THERAPY: (10 minutes):   Joint mobilization and Soft tissue mobilization was utilized and necessary because of the patient's restricted joint motion,

## 2024-05-22 ENCOUNTER — HOSPITAL ENCOUNTER (OUTPATIENT)
Dept: PHYSICAL THERAPY | Age: 74
Setting detail: RECURRING SERIES
Discharge: HOME | End: 2024-05-25
Payer: MEDICARE

## 2024-05-22 PROCEDURE — 97110 THERAPEUTIC EXERCISES: CPT

## 2024-05-22 PROCEDURE — 97140 MANUAL THERAPY 1/> REGIONS: CPT

## 2024-05-22 ASSESSMENT — PAIN SCALES - GENERAL: PAINLEVEL_OUTOF10: 2

## 2024-05-22 NOTE — PROGRESS NOTES
Dionisio Waggoner  : 1950  Primary: Medicare Part A [place] B (Medicare)  Secondary: MUTUAL [place] MEDICARE SUPP [facility] @ [place] [address] [address] [place] [address]  Phone: [phone]  Fax: [phone] Plan Frequency: 2x for 8 weeks    Plan of Care/Certification Expiration Date: 24      >PT Visit Info:  Plan Frequency: 2x for 8 weeks  Plan of Care/Certification Expiration Date: 24  Progress Note Counter: 6      Visit Count:  16    OUTPATIENT PHYSICAL THERAPY:OP NOTE TYPE: Treatment Note 2024       Episode  }Appt Desk             Treatment Diagnosis:    Vertebrogenic low back pain  Other chronic pain  Muscle weakness (generalized)  Medical/Referring Diagnosis:  Low back pain, unspecified [M54.50]  Referring Physician:  Paulo Leslie MD MD Orders:  PT Eval and Treat    Date of Onset:  Onset Date: 24 (Acute on chronic irritation)     Allergies:   Hydrocodone, Codeine, Oxycodone, Zolpidem, and Propofol  Restrictions/Precautions:  No data recordedNo data recorded   Interventions Planned (Treatment may consist of any combination of the following):    No data recorded   >Subjective Comments:  Sherif states he is doing ok, some soreness. He rode the bike for 1 hour Monday and felt ok, just sore post.     >Initial:     2/10>Post Session:       1/10  Medications Last Reviewed:  2024  Updated Objective Findings:    Lumbar flexion ROM greatly improved with improved speed of movement and minimal pain reported  Paraspinal tone notably improved    L3/4 limited opening L   Treatment   MANUAL THERAPY: (10 minutes):   Joint mobilization and Soft tissue mobilization was utilized and necessary because of the patient's restricted joint motion, painful spasm, and loss of articular motion.   Date: 24  S/l lumbar distraction B  L3/4 L4/5 L5/S1 gapping mjm grade 3/4 B   Deferred   S/l paraspinal stm/mfr   S/l L QL LAD   B hip LAD mjm grade 3/4     THERAPEUTIC

## 2024-05-28 RX ORDER — HYDRALAZINE HYDROCHLORIDE 10 MG/1
125 TABLET, FILM COATED ORAL 3 TIMES DAILY
Qty: 90 TABLET | Refills: 0 | Status: CANCELLED | OUTPATIENT
Start: 2024-05-28

## 2024-05-28 RX ORDER — HYDRALAZINE HYDROCHLORIDE 25 MG/1
25 TABLET, FILM COATED ORAL 3 TIMES DAILY
Qty: 270 TABLET | Refills: 1 | Status: SHIPPED | OUTPATIENT
Start: 2024-05-28

## 2024-05-28 NOTE — TELEPHONE ENCOUNTER
Requested Prescriptions     Pending Prescriptions Disp Refills    hydrALAZINE (APRESOLINE) 10 MG tablet 90 tablet 0     Sig: Take 12.5 tablets by mouth 3 times daily     Dose verified. This medication was prescribed by [facility] on 08/09/2023.   To [facility]. Patient is scheduled for follow up visit.

## 2024-05-28 NOTE — TELEPHONE ENCOUNTER
Requested Prescriptions     Pending Prescriptions Disp Refills    hydrALAZINE (APRESOLINE) 25 MG tablet 270 tablet 1     Sig: Take 1 tablet by mouth 3 times daily     Verified with patient.   To [facility]. Patient is scheduled for follow up visit.

## 2024-06-03 ENCOUNTER — APPOINTMENT (OUTPATIENT)
Dept: PHYSICAL THERAPY | Age: 74
End: 2024-06-03
Payer: MEDICARE

## 2024-06-05 ENCOUNTER — HOSPITAL ENCOUNTER (OUTPATIENT)
Dept: PHYSICAL THERAPY | Age: 74
Setting detail: RECURRING SERIES
Discharge: HOME | End: 2024-06-08
Payer: MEDICARE

## 2024-06-05 PROCEDURE — 97110 THERAPEUTIC EXERCISES: CPT

## 2024-06-05 PROCEDURE — 97140 MANUAL THERAPY 1/> REGIONS: CPT

## 2024-06-05 ASSESSMENT — PAIN SCALES - GENERAL: PAINLEVEL_OUTOF10: 2

## 2024-06-05 NOTE — PROGRESS NOTES
on Thoracic, lumbar and SI mobility with trunk stability and strength training.    >Total Treatment Billable Duration:  53 total minutes  Time In: 1030  Time Out: 1124    BREN VÁSQUEZ PT       Charge Capture  }Post Session Pain  PT Visit Info  MedBridge Portal  MD Guidelines  Scanned Media  Benefits  MyChart    Future Appointments   Date Time Provider Department Center   6/10/2024 12:30 PM SofiaBren francisco, PT [facility]   6/11/2024  9:00 AM [facility]   6/12/2024 11:30 AM SofiaBren francisco, PT [facility]   6/17/2024 10:30 AM SofiaBren francisco, PT [facility]   6/19/2024 10:30 AM Bren Vásquez, PT [facility]   6/26/2024  1:30 PM SFS US 1 [facility]US SFS   7/3/2024  1:30 PM SofiaBren francisco, PT [facility]FO   7/8/2024  9:20 AM Paulo Leslie MD [facility]   7/10/2024 12:30 PM SofiaBren francisco, PT [facility]   7/17/2024 12:30 PM SofiaBren francisco, PT [facility]   7/24/2024 12:30 PM SofiaBren francisco, PT [facility]

## 2024-06-10 ENCOUNTER — APPOINTMENT (OUTPATIENT)
Dept: PHYSICAL THERAPY | Age: 74
End: 2024-06-10
Payer: MEDICARE

## 2024-06-11 ENCOUNTER — OFFICE VISIT (OUTPATIENT)
Dept: ORTHOPEDIC SURGERY | Age: 74
End: 2024-06-11
Payer: MEDICARE

## 2024-06-11 DIAGNOSIS — M47.817 LUMBOSACRAL SPONDYLOSIS WITHOUT MYELOPATHY: Primary | ICD-10-CM

## 2024-06-11 PROCEDURE — 64494 INJ PARAVERT F JNT L/S 2 LEV: CPT | Performed by: ANESTHESIOLOGY

## 2024-06-11 PROCEDURE — 64493 INJ PARAVERT F JNT L/S 1 LEV: CPT | Performed by: ANESTHESIOLOGY

## 2024-06-11 NOTE — PROGRESS NOTES
Satisfactory needle position was confirmed on AP, lateral, and oblique visualizations. 0.5 ml of 0.5% bupivacaine were then injected.    An oblique view of the left LS transverse process was then utilized to identify the junction of the junction of the transverse process and superior articulating process. The skin overlying this area was anesthetized with 0.2 ml of 1% lidocaine using a 25 G 1.5 inch needle. Next, using intermittent fluoroscopic guidance, the spinal needle needle was advanced toward the junction of the transverse process and superior articulating process, favoring the cephalad aspect of this junction. Satisfactory needle position was confirmed on AP, lateral, and oblique visualizations. 0.5 ml of 0.5% bupivacaine was then injected.    Next, an oblique view of the left L4 transverse process was then utilized to identify the junction of the junction of the transverse process and superior articulating process. The skin overlying this area was anesthetized with  0.2 ml of 1% lidocaine using a 25 G 1.5 inch needle. Next, using intermittent fluoroscopic guidance, the spinal needle needle was advanced toward the junction of the transverse process and superior articulating process, favoring the cephalad aspect of this junction. Satisfactory needle position was confirmed on AP, lateral, and oblique visualizations. 0.5 ml of 0.5% bupivacaine was then injected.    The needles were withdrawn and Band-Aids were applied. The patient was transported to the recovery room and monitored for an appropriate amount of time, and after meeting discharge criteria, was discharged home with a . No complications were noted through the procedure or recovery period.

## 2024-06-12 ENCOUNTER — HOSPITAL ENCOUNTER (OUTPATIENT)
Dept: PHYSICAL THERAPY | Age: 74
Setting detail: RECURRING SERIES
End: 2024-06-12
Payer: MEDICARE

## 2024-06-12 ENCOUNTER — TELEPHONE (OUTPATIENT)
Age: 74
End: 2024-06-12

## 2024-06-12 DIAGNOSIS — M47.817 LUMBOSACRAL SPONDYLOSIS WITHOUT MYELOPATHY: Primary | ICD-10-CM

## 2024-06-12 DIAGNOSIS — F51.04 CHRONIC INSOMNIA: ICD-10-CM

## 2024-06-12 RX ORDER — TRAZODONE HYDROCHLORIDE 100 MG/1
100 TABLET ORAL NIGHTLY PRN
Qty: 90 TABLET | Refills: 1 | Status: SHIPPED | OUTPATIENT
Start: 2024-06-12

## 2024-06-12 NOTE — TELEPHONE ENCOUNTER
Procedure: Bilateral L4/5, L5/SA mbb #2    Procedure Date: 6/11/24    Daily Average Pain Level: 5    Pain level today: 2    Pain level at its worst: 2    1st Hour After Block: 0    2nd Hour After Block:  0    3rd Hour After Block:  0    4th Hour After Block:  0    5th Hour After Block 0    6th Hour After Block 0    7th Hour After Block 0    8th Hour After Block 0    Activities:  house and yard work, bending, lifting, twisting    Over Pain Relief for the first 4 hours:  100%    Did the block help with your daily functions?:  Yes  Were you able to walk better?:  Yes  Whatever the pain is hindering you from doing, was it better during the block:  Yes  Have you continued home exercises/stretches?:  Yes    Follow up:  would like to proceed with bilateral L4/5, L5/SA RFA

## 2024-06-12 NOTE — TELEPHONE ENCOUNTER
Requested Prescriptions     Pending Prescriptions Disp Refills    traZODone (DESYREL) 100 MG tablet 90 tablet 1     Sig: Take 1 tablet by mouth nightly as needed for Sleep     Dose verified and to [facility]. Patient is scheduled for follow up visit.

## 2024-06-12 NOTE — TELEPHONE ENCOUNTER
Procedure: Bilateral Lumbar Medial Branch Block Under Fluoroscopic Imaging, L4-L5, LS-SA Facets     Procedure Date: 6/11/24    Daily Average Pain Level: 5    Pain level today: 2    Pain level at its worst: 6-7    1st Hour After Block: 0    2nd Hour After Block:  0    3rd Hour After Block:  0    4th Hour After Block:  0    5th Hour After Block 0    6th Hour After Block 0    7th Hour After Block 0    8th Hour After Block 0    Activities:  bending, lifting, stooping    Over Pain Relief for the first 4 hours:  100% benefit    Did the block help with your daily functions?:  Yes  Were you able to walk better?:  Yes  Whatever the pain is hindering you from doing, was it better during the block:  Yes  Have you continued home exercises/stretches?:  Yes    Follow up:  mbb #2 pending

## 2024-06-13 ENCOUNTER — HOSPITAL ENCOUNTER (OUTPATIENT)
Dept: PHYSICAL THERAPY | Age: 74
Setting detail: RECURRING SERIES
Discharge: HOME | End: 2024-06-16
Payer: MEDICARE

## 2024-06-13 PROCEDURE — 97110 THERAPEUTIC EXERCISES: CPT

## 2024-06-13 PROCEDURE — 97140 MANUAL THERAPY 1/> REGIONS: CPT

## 2024-06-13 ASSESSMENT — PAIN SCALES - GENERAL: PAINLEVEL_OUTOF10: 2

## 2024-06-13 NOTE — PROGRESS NOTES
Dionisio Waggoner  : 1950  Primary: Medicare Part A And B (Medicare)  Secondary: [employer] [place] MEDICARE SUPP [facility] @ [place] [address] [place] [address]  Phone: [phone]  Fax: [phone] Plan Frequency: 2x for 8 weeks    Plan of Care/Certification Expiration Date: 24      >PT Visit Info:  Plan Frequency: 2x for 8 weeks  Plan of Care/Certification Expiration Date: 24  Progress Note Counter: 0      Visit Count:  18    OUTPATIENT PHYSICAL THERAPY:OP NOTE TYPE: Treatment Note 2024       Episode  }Appt Desk             Treatment Diagnosis:    Vertebrogenic low back pain  Other chronic pain  Muscle weakness (generalized)  Medical/Referring Diagnosis:  Low back pain, unspecified [M54.50]  Referring Physician:  Paulo Leslie MD MD Orders:  PT Eval and Treat    Date of Onset:  Onset Date: 24 (Acute on chronic irritation)     Allergies:   Hydrocodone, Codeine, Oxycodone, Zolpidem, and Propofol  Restrictions/Precautions:  No data recordedNo data recorded   Interventions Planned (Treatment may consist of any combination of the following):    No data recorded   >Subjective Comments:  Sherif states he is doing well, see PN. He has a spasm in his quad that is bothering him.     >Initial:     2/10>Post Session:       1/10  Medications Last Reviewed:  2024  Updated Objective Findings:    Lumbar flexion ROM greatly improved with improved speed of movement and minimal pain reported  Paraspinal tone notably improved   L quad and adductor trigger points   Treatment   MANUAL THERAPY: (10 minutes):   Joint mobilization and Soft tissue mobilization was utilized and necessary because of the patient's restricted joint motion, painful spasm, and loss of articular motion.   Date: 24    S/l lumbar distraction B  L quad and adductor stm/mfr     Deferred   L3/4 L4/5 L5/S1 gapping mjm grade 3/4 B   S/l paraspinal stm/mfr   S/l L QL LAD   B hip LAD mjm grade

## 2024-06-13 NOTE — THERAPY RECERTIFICATION
section is scored on a 0-5 scale, 5 representing the greatest disability.  The scores of each section are added together for a total score of 50.      Medical Necessity:   > Skilled intervention continues to be required due to increased lumbar pain, limited functional mobility and weakness.  Reason For Services/Other Comments:  > Patient continues to require skilled intervention due to increased lumbar pain limiting functional abilities for ADL's and hobbies.      Regarding Dionisio Waggoner's therapy, I certify that the treatment plan above will be carried out by a therapist or under their direction.  Thank you for this referral,  DWAIN VÁSQUEZ PT     Referring Physician Signature: Paulo Leslie MD                    Charge Capture  Appt Desk

## 2024-06-17 ENCOUNTER — HOSPITAL ENCOUNTER (OUTPATIENT)
Dept: PHYSICAL THERAPY | Age: 74
Setting detail: RECURRING SERIES
Discharge: HOME | End: 2024-06-20
Payer: MEDICARE

## 2024-06-17 PROCEDURE — 97110 THERAPEUTIC EXERCISES: CPT

## 2024-06-17 ASSESSMENT — PAIN SCALES - GENERAL: PAINLEVEL_OUTOF10: 1

## 2024-06-17 NOTE — PROGRESS NOTES
Dionisio Waggoner  : 1950  Primary: Medicare Part A And B (Medicare)  Secondary: [employer] [place] MEDICARE SUPP [facility] @ [place] [address] [place] [address]  Phone: [phone]  Fax: [phone] Plan Frequency: 2x for 8 weeks    Plan of Care/Certification Expiration Date: 24      >PT Visit Info:  Plan Frequency: 2x for 8 weeks  Plan of Care/Certification Expiration Date: 24  Progress Note Counter: 1      Visit Count:  19    OUTPATIENT PHYSICAL THERAPY:OP NOTE TYPE: Treatment Note 2024       Episode  }Appt Desk             Treatment Diagnosis:    Vertebrogenic low back pain  Other chronic pain  Muscle weakness (generalized)  Medical/Referring Diagnosis:  Low back pain, unspecified [M54.50]  Referring Physician:  Paulo Leslie MD MD Orders:  PT Eval and Treat    Date of Onset:  Onset Date: 24 (Acute on chronic irritation)     Allergies:   Hydrocodone, Codeine, Oxycodone, Zolpidem, and Propofol  Restrictions/Precautions:  No data recordedNo data recorded   Interventions Planned (Treatment may consist of any combination of the following):    No data recorded   >Subjective Comments:  Sherif reports he is doing really well, he feels ready to begin weight training at the gym.     >Initial:     1/10>Post Session:       1/10  Medications Last Reviewed:  2024  Updated Objective Findings:    Lumbar flexion ROM greatly improved with improved speed of movement and minimal pain reported  Paraspinal tone notably improved   L quad and adductor trigger points   Treatment   MANUAL THERAPY: (0 minutes):   Joint mobilization and Soft tissue mobilization was utilized and necessary because of the patient's restricted joint motion, painful spasm, and loss of articular motion.   Date: 24    Deferred   S/l lumbar distraction B  L quad and adductor stm/mfr   L3/4 L4/5 L5/S1 gapping mjm grade 3/4 B   S/l paraspinal stm/mfr   S/l L QL LAD   B hip LAD mjm grade

## 2024-06-19 ENCOUNTER — HOSPITAL ENCOUNTER (OUTPATIENT)
Dept: PHYSICAL THERAPY | Age: 74
Setting detail: RECURRING SERIES
Discharge: HOME | End: 2024-06-22
Payer: MEDICARE

## 2024-06-19 PROCEDURE — 97110 THERAPEUTIC EXERCISES: CPT

## 2024-06-19 ASSESSMENT — PAIN SCALES - GENERAL: PAINLEVEL_OUTOF10: 1

## 2024-06-19 NOTE — PROGRESS NOTES
S/l paraspinal stm/mfr   S/l L QL LAD   B hip LAD mjm grade 3/4     THERAPEUTIC EXERCISE: (53 minutes):    Exercises per grid below to improve mobility, strength, and balance.  Required minimal verbal and manual cues to promote proper body alignment, promote proper body posture, and promote proper body mechanics.  Progressed resistance, range, and repetitions as indicated.    Date: 06/19/24    Pt edu about POC and return to weight training - review of machines in the gym, cable column   PPT x10 reps   Open books x15 reps B  Clam shells x10\" x12 reps B  Bridges x15 reps - cue for hip extension   STS 2x10 reps 8.8# KB   Seated hip hinge 8.8# KB  2x10 reps  Bent over rows 6# DB 2x10 reps staggered stance   Upright rows 6# DB 2x10 reps   Cable column: chest press, rows, and lat pull down unilaterally staggered stance 2x10 each exercise - cues for control and to minimize trunk extension compensations     Deferred   Wall bird dogs to fatigue - pt struggles with coordinatoin   Supine elbow to knee yoga block x5\" x6 reps B - fatigued, cues to avoid bearing down   Dimas x15 reps B - improved stability   Walking dimas to fatigue   Wall posture for ppt and thoracic extension x10\" x8 reps - improved control today   Wall hip hinge with 10# DB 2x10  reps - cues for thoracic ext   Donkey kicks bent over table x10 reps B - cue for stance control    Squat paloff press green TB x12 reps B     Treatment/Session Summary:    >Treatment Assessment:  Sherif did very well, plan to assess independence and readiness for d/c in coming visits.     Communication/Consultation:  None today  Equipment provided today:  None today  Recommendations/Intent for next treatment session: Next visit will focus on Thoracic, lumbar and SI mobility with trunk stability and strength training.    >Total Treatment Billable Duration:  53 total minutes  Time In: 1032  Time Out: 1129    DWAIN VÁSQUEZ, PT       Charge Capture  }Post Session Pain  PT Visit

## 2024-07-01 ENCOUNTER — HOSPITAL ENCOUNTER (OUTPATIENT)
Dept: ULTRASOUND IMAGING | Age: 74
Discharge: HOME | End: 2024-07-03
Attending: INTERNAL MEDICINE
Payer: MEDICARE

## 2024-07-01 DIAGNOSIS — C61 PROSTATE CANCER (HCC): ICD-10-CM

## 2024-07-01 DIAGNOSIS — I10 HYPERTENSION, ESSENTIAL: ICD-10-CM

## 2024-07-01 DIAGNOSIS — N28.1 RENAL CYST, RIGHT: ICD-10-CM

## 2024-07-01 DIAGNOSIS — E78.2 HYPERLIPIDEMIA, MIXED: ICD-10-CM

## 2024-07-01 LAB
ALBUMIN SERPL-MCNC: 4.3 G/DL (ref 3.2–4.6)
ALBUMIN/GLOB SERPL: 2.1 (ref 1–1.9)
ALP SERPL-CCNC: 79 U/L (ref 40–129)
ALT SERPL-CCNC: 20 U/L (ref 12–65)
ANION GAP SERPL CALC-SCNC: 10 MMOL/L (ref 9–18)
AST SERPL-CCNC: 27 U/L (ref 15–37)
BILIRUB SERPL-MCNC: 0.6 MG/DL (ref 0–1.2)
BUN SERPL-MCNC: 20 MG/DL (ref 8–23)
CALCIUM SERPL-MCNC: 9.4 MG/DL (ref 8.8–10.2)
CHLORIDE SERPL-SCNC: 101 MMOL/L (ref 98–107)
CHOLEST SERPL-MCNC: 117 MG/DL (ref 0–200)
CO2 SERPL-SCNC: 23 MMOL/L (ref 20–28)
CREAT SERPL-MCNC: 1.3 MG/DL (ref 0.8–1.3)
GLOBULIN SER CALC-MCNC: 2 G/DL (ref 2.3–3.5)
GLUCOSE SERPL-MCNC: 97 MG/DL (ref 70–99)
HDLC SERPL-MCNC: 40 MG/DL (ref 40–60)
HDLC SERPL: 2.9 (ref 0–5)
LDLC SERPL CALC-MCNC: 63 MG/DL (ref 0–100)
POTASSIUM SERPL-SCNC: 4.9 MMOL/L (ref 3.5–5.1)
PROT SERPL-MCNC: 6.3 G/DL (ref 6.3–8.2)
PSA SERPL-MCNC: <0.1 NG/ML (ref 0–4)
SODIUM SERPL-SCNC: 134 MMOL/L (ref 136–145)
TRIGL SERPL-MCNC: 72 MG/DL (ref 0–150)
VLDLC SERPL CALC-MCNC: 14 MG/DL (ref 6–23)

## 2024-07-01 PROCEDURE — 76770 US EXAM ABDO BACK WALL COMP: CPT

## 2024-07-03 ENCOUNTER — HOSPITAL ENCOUNTER (OUTPATIENT)
Dept: PHYSICAL THERAPY | Age: 74
Setting detail: RECURRING SERIES
Discharge: HOME | End: 2024-07-06
Payer: MEDICARE

## 2024-07-03 PROCEDURE — 97110 THERAPEUTIC EXERCISES: CPT

## 2024-07-03 ASSESSMENT — PAIN SCALES - GENERAL: PAINLEVEL_OUTOF10: 1

## 2024-07-03 NOTE — PROGRESS NOTES
Dionisio Waggoner  : 1950  Primary: Medicare Part A And B (Medicare)  Secondary: [employer] [place] MEDICARE SUPP [facility] @ [place] [address] [place] [address]  Phone: [phone]  Fax: [phone] Plan Frequency: 2x for 8 weeks    Plan of Care/Certification Expiration Date: 24      >PT Visit Info:  Plan Frequency: 2x for 8 weeks  Plan of Care/Certification Expiration Date: 24  Progress Note Counter: 3      Visit Count:  21    OUTPATIENT PHYSICAL THERAPY:OP NOTE TYPE: Treatment Note 7/3/2024       Episode  }Appt Desk             Treatment Diagnosis:    Vertebrogenic low back pain  Other chronic pain  Muscle weakness (generalized)  Medical/Referring Diagnosis:  Low back pain, unspecified [M54.50]  Referring Physician:  Paulo Leslie MD MD Orders:  PT Eval and Treat    Date of Onset:  Onset Date: 24 (Acute on chronic irritation)     Allergies:   Hydrocodone, Codeine, Oxycodone, Zolpidem, and Propofol  Restrictions/Precautions:  No data recordedNo data recorded   Interventions Planned (Treatment may consist of any combination of the following):    No data recorded   >Subjective Comments:  Sherif returns after 2 weeks away from PT, he states he is doing really well, he is not having hardly any pain, he rode to [place] and back, did a lot of walking, and is overall doing very well. He feels ready for decreasing his frequency of care to 1x per month to f/u on how ablation goes and to allow him time for weight training and HEP.     >Initial:     1/10>Post Session:       1/10  Medications Last Reviewed:  7/3/2024  Updated Objective Findings:    Lumbar flexion ROM greatly improved with improved speed of movement and minimal pain reported  Paraspinal tone notably improved   L quad and adductor trigger points   Treatment   MANUAL THERAPY: (0 minutes):   Joint mobilization and Soft tissue mobilization was utilized and necessary because of the patient's restricted

## 2024-07-08 ENCOUNTER — OFFICE VISIT (OUTPATIENT)
Dept: INTERNAL MEDICINE CLINIC | Facility: CLINIC | Age: 74
End: 2024-07-08
Payer: MEDICARE

## 2024-07-08 VITALS
HEIGHT: 67 IN | WEIGHT: 167 LBS | HEART RATE: 56 BPM | SYSTOLIC BLOOD PRESSURE: 122 MMHG | DIASTOLIC BLOOD PRESSURE: 64 MMHG | BODY MASS INDEX: 26.21 KG/M2

## 2024-07-08 DIAGNOSIS — N28.1 RENAL CYST, RIGHT: ICD-10-CM

## 2024-07-08 DIAGNOSIS — I25.10 CAD S/P PERCUTANEOUS CORONARY ANGIOPLASTY: ICD-10-CM

## 2024-07-08 DIAGNOSIS — K63.5 POLYP OF COLON, UNSPECIFIED PART OF COLON, UNSPECIFIED TYPE: ICD-10-CM

## 2024-07-08 DIAGNOSIS — N18.31 CHRONIC KIDNEY DISEASE, STAGE 3A (HCC): ICD-10-CM

## 2024-07-08 DIAGNOSIS — M54.50 CHRONIC BILATERAL LOW BACK PAIN WITHOUT SCIATICA: ICD-10-CM

## 2024-07-08 DIAGNOSIS — E78.2 HYPERLIPIDEMIA, MIXED: ICD-10-CM

## 2024-07-08 DIAGNOSIS — I10 HYPERTENSION, ESSENTIAL: ICD-10-CM

## 2024-07-08 DIAGNOSIS — I50.22 CHRONIC HFREF (HEART FAILURE WITH REDUCED EJECTION FRACTION) (HCC): ICD-10-CM

## 2024-07-08 DIAGNOSIS — Z98.61 CAD S/P PERCUTANEOUS CORONARY ANGIOPLASTY: ICD-10-CM

## 2024-07-08 DIAGNOSIS — I65.22 STENOSIS OF LEFT CAROTID ARTERY: ICD-10-CM

## 2024-07-08 DIAGNOSIS — G89.29 CHRONIC BILATERAL LOW BACK PAIN WITHOUT SCIATICA: ICD-10-CM

## 2024-07-08 DIAGNOSIS — Z23 ENCOUNTER FOR IMMUNIZATION: Chronic | ICD-10-CM

## 2024-07-08 DIAGNOSIS — R91.1 LUNG NODULE: ICD-10-CM

## 2024-07-08 DIAGNOSIS — C61 PROSTATE CANCER (HCC): Primary | ICD-10-CM

## 2024-07-08 PROCEDURE — G2211 COMPLEX E/M VISIT ADD ON: HCPCS | Performed by: INTERNAL MEDICINE

## 2024-07-08 PROCEDURE — 1123F ACP DISCUSS/DSCN MKR DOCD: CPT | Performed by: INTERNAL MEDICINE

## 2024-07-08 PROCEDURE — 3078F DIAST BP <80 MM HG: CPT | Performed by: INTERNAL MEDICINE

## 2024-07-08 PROCEDURE — G8428 CUR MEDS NOT DOCUMENT: HCPCS | Performed by: INTERNAL MEDICINE

## 2024-07-08 PROCEDURE — 3074F SYST BP LT 130 MM HG: CPT | Performed by: INTERNAL MEDICINE

## 2024-07-08 PROCEDURE — 99214 OFFICE O/P EST MOD 30 MIN: CPT | Performed by: INTERNAL MEDICINE

## 2024-07-08 PROCEDURE — 1036F TOBACCO NON-USER: CPT | Performed by: INTERNAL MEDICINE

## 2024-07-08 PROCEDURE — G8417 CALC BMI ABV UP PARAM F/U: HCPCS | Performed by: INTERNAL MEDICINE

## 2024-07-08 PROCEDURE — 3017F COLORECTAL CA SCREEN DOC REV: CPT | Performed by: INTERNAL MEDICINE

## 2024-07-08 RX ORDER — TRAMADOL HYDROCHLORIDE 50 MG/1
50 TABLET ORAL EVERY 8 HOURS PRN
Qty: 90 TABLET | Refills: 2 | Status: SHIPPED | OUTPATIENT
Start: 2024-07-08 | End: 2025-07-08

## 2024-07-08 SDOH — ECONOMIC STABILITY: INCOME INSECURITY: HOW HARD IS IT FOR YOU TO PAY FOR THE VERY BASICS LIKE FOOD, HOUSING, MEDICAL CARE, AND HEATING?: NOT VERY HARD

## 2024-07-08 SDOH — ECONOMIC STABILITY: FOOD INSECURITY: WITHIN THE PAST 12 MONTHS, THE FOOD YOU BOUGHT JUST DIDN'T LAST AND YOU DIDN'T HAVE MONEY TO GET MORE.: NEVER TRUE

## 2024-07-08 SDOH — ECONOMIC STABILITY: FOOD INSECURITY: WITHIN THE PAST 12 MONTHS, YOU WORRIED THAT YOUR FOOD WOULD RUN OUT BEFORE YOU GOT MONEY TO BUY MORE.: NEVER TRUE

## 2024-07-08 SDOH — ECONOMIC STABILITY: HOUSING INSECURITY
IN THE LAST 12 MONTHS, WAS THERE A TIME WHEN YOU DID NOT HAVE A STEADY PLACE TO SLEEP OR SLEPT IN A SHELTER (INCLUDING NOW)?: NO

## 2024-07-08 ASSESSMENT — ENCOUNTER SYMPTOMS
VOICE CHANGE: 0
CHOKING: 0
STRIDOR: 0
EYE PAIN: 0
RECTAL PAIN: 0

## 2024-07-08 NOTE — PROGRESS NOTES
pain.   Respiratory:  Negative for choking and stridor.    Gastrointestinal:  Negative for rectal pain.   Endocrine: Negative for polydipsia and polyphagia.   Genitourinary:  Negative for enuresis and penile pain.   Musculoskeletal:  Negative for gait problem and neck stiffness.   Skin:  Negative for pallor.   Allergic/Immunologic: Negative for immunocompromised state.   Neurological:  Negative for facial asymmetry and speech difficulty.   Hematological:  Does not bruise/bleed easily.   Psychiatric/Behavioral:  Negative for self-injury. The patient is not hyperactive.             Patient Care Team:  Paulo Leslie MD as PCP - General (Internal Medicine)  Paulo Leslie MD as PCP - Empaneled Provider    Objective:    /64 (Site: Left Upper Arm, Position: Sitting)   Pulse 56   Ht 1.708 m (5' 7.25\")   Wt 75.8 kg (167 lb)   BMI 25.96 kg/m²     Physical Exam  Vitals reviewed.   Constitutional:       General: He is not in acute distress.     Appearance: Normal appearance. He is not toxic-appearing.   HENT:      Head: Normocephalic and atraumatic.      Right Ear: Tympanic membrane, ear canal and external ear normal.      Left Ear: Tympanic membrane, ear canal and external ear normal.      Nose: Nose normal.      Mouth/Throat:      Mouth: Mucous membranes are moist.      Pharynx: Oropharynx is clear.   Eyes:      General: No scleral icterus.     Extraocular Movements: Extraocular movements intact.      Conjunctiva/sclera: Conjunctivae normal.      Pupils: Pupils are equal, round, and reactive to light.   Cardiovascular:      Rate and Rhythm: Normal rate and regular rhythm.      Pulses: Normal pulses.      Heart sounds: Normal heart sounds.   Pulmonary:      Breath sounds: Normal breath sounds.   Abdominal:      General: Abdomen is flat. Bowel sounds are normal.      Palpations: Abdomen is soft. There is no mass.      Tenderness: There is no guarding or rebound.   Musculoskeletal:         General: Normal range of

## 2024-07-10 ENCOUNTER — APPOINTMENT (OUTPATIENT)
Dept: PHYSICAL THERAPY | Age: 74
End: 2024-07-10
Payer: MEDICARE

## 2024-07-11 ENCOUNTER — APPOINTMENT (OUTPATIENT)
Dept: PHYSICAL THERAPY | Age: 74
End: 2024-07-11
Payer: MEDICARE

## 2024-07-17 ENCOUNTER — APPOINTMENT (OUTPATIENT)
Dept: PHYSICAL THERAPY | Age: 74
End: 2024-07-17
Payer: MEDICARE

## 2024-07-24 ENCOUNTER — APPOINTMENT (OUTPATIENT)
Dept: PHYSICAL THERAPY | Age: 74
End: 2024-07-24
Payer: MEDICARE

## 2024-07-25 ENCOUNTER — OFFICE VISIT (OUTPATIENT)
Dept: ORTHOPEDIC SURGERY | Age: 74
End: 2024-07-25
Payer: MEDICARE

## 2024-07-25 DIAGNOSIS — M47.817 LUMBOSACRAL SPONDYLOSIS WITHOUT MYELOPATHY: Primary | ICD-10-CM

## 2024-07-25 PROCEDURE — 64635 DESTROY LUMB/SAC FACET JNT: CPT | Performed by: ANESTHESIOLOGY

## 2024-07-25 PROCEDURE — 64636 DESTROY L/S FACET JNT ADDL: CPT | Performed by: ANESTHESIOLOGY

## 2024-07-25 NOTE — PROGRESS NOTES
NAME: Dionisio Waggoner   ID:[id number]   :1950    Location: [facility]    Procedure: bilateral Lumbar Medial Branch Radiofrequency Ablation at L4, L5, SA Under Fluoroscopic Imaging      Pre-op Diagnosis: Lumbar Spondylosis without Myelopathy    Post-op Diagnosis: Same     Anesthesia: Local only    Complications: None    After confirming written and informed consent and discussing the risk, benefits and alternatives for the procedure, the patient had the correct site marked by the physician performing the procedure. The specific risks of bleeding and infection were discussed, as was the specific risk of neuritis. The patient was taken to the fluoroscopy suite. A pulse oximeter was placed, and verbal and visual monitoring were maintained throughout the procedure. The patient was then placed in the prone position. The skin overlying the lumbo-sacral spine was then prepped with chlorhexidine gluconate and a sterile drape was placed. A hat and mask were worn, and the hands were cleaned with an alcohol-containing solution. Sterile gloves were then worn. A time out was then performed involving the physician, radiation technologist and the patient.    A 20 G, 10 cm radiofrequency cannula with a 10 mm active tip was used at each level. Next, the anatomy of the lumbar spine was then identified using fluoroscopic guidance.    An oblique view of the L4 transverse process was then utilized to identify the junction of the junction of the transverse process and superior articulating process. The skin overlying this area was anesthetized with 0.2 ml of 1% lidocaine using a 25 G 1.5 inch needle. Next, using intermittent fluoroscopic guidance, the radiofrequency ablation cannula was advanced toward the junction of the transverse process and superior articulating process, favoring the cephalad aspect of this junction and along the course of the medial branch. Satisfactory needle position was confirmed on AP, lateral, and oblique

## 2024-08-07 ENCOUNTER — HOSPITAL ENCOUNTER (OUTPATIENT)
Dept: PHYSICAL THERAPY | Age: 74
Setting detail: RECURRING SERIES
Discharge: HOME | End: 2024-08-10
Payer: MEDICARE

## 2024-08-07 PROCEDURE — 97110 THERAPEUTIC EXERCISES: CPT

## 2024-08-07 ASSESSMENT — PAIN SCALES - GENERAL: PAINLEVEL_OUTOF10: 0

## 2024-08-07 NOTE — PROGRESS NOTES
Dionisio Waggoner  : 1950  Primary: Medicare Part A [place] B (Medicare)  Secondary: [employer] [place] MEDICARE SUPP [facility] @ [place] [address] [address] [place] [address]  Phone: [phone]  Fax: [phone] Plan Frequency: 2x for 8 weeks    Plan of Care/Certification Expiration Date: 24      >PT Visit Info:  Plan Frequency: 2x for 8 weeks  Plan of Care/Certification Expiration Date: 24  Progress Note Counter: 4      Visit Count:  22    OUTPATIENT PHYSICAL THERAPY:OP NOTE TYPE: Treatment Note 2024       Episode  }Appt Desk             Treatment Diagnosis:    Vertebrogenic low back pain  Other chronic pain  Muscle weakness (generalized)  Medical/Referring Diagnosis:  Low back pain, unspecified [M54.50]  Referring Physician:  Paulo Leslie MD MD Orders:  PT Eval and Treat    Date of Onset:  Onset Date: 24 (Acute on chronic irritation)     Allergies:   Hydrocodone, Codeine, Oxycodone, Zolpidem, and Propofol  Restrictions/Precautions:  No data recordedNo data recorded   Interventions Planned (Treatment may consist of any combination of the following):    No data recorded   >Subjective Comments:  Sherif returns after 1 month away from PT and having a lumbar ablasion of L2, 4, 5, and S1 that went very well. He has not had any pain since the procedure and has been regular 3x a week in the gym with weight training and cardio.     >Initial:     0/10>Post Session:       0/10  Medications Last Reviewed:  2024  Updated Objective Findings:    Lumbar flexion ROM greatly improved with improved speed of movement and minimal pain reported  Paraspinal tone notably improved   L quad and adductor trigger points   Treatment   MANUAL THERAPY: (0 minutes):   Joint mobilization and Soft tissue mobilization was utilized and necessary because of the patient's restricted joint motion, painful spasm, and loss of articular motion.   Date: 24    Deferred   S/l lumbar distraction

## 2024-09-11 ENCOUNTER — HOSPITAL ENCOUNTER (OUTPATIENT)
Dept: PHYSICAL THERAPY | Age: 74
Setting detail: RECURRING SERIES
Discharge: HOME | End: 2024-09-14
Payer: MEDICARE

## 2024-09-11 PROCEDURE — 97110 THERAPEUTIC EXERCISES: CPT

## 2024-09-11 ASSESSMENT — PAIN SCALES - GENERAL: PAINLEVEL_OUTOF10: 0

## 2024-09-26 ENCOUNTER — APPOINTMENT (RX ONLY)
Dept: URBAN - METROPOLITAN AREA CLINIC 329 | Facility: CLINIC | Age: 74
Setting detail: DERMATOLOGY
End: 2024-09-26

## 2024-09-26 DIAGNOSIS — L57.8 OTHER SKIN CHANGES DUE TO CHRONIC EXPOSURE TO NONIONIZING RADIATION: ICD-10-CM

## 2024-09-26 DIAGNOSIS — L82.1 OTHER SEBORRHEIC KERATOSIS: ICD-10-CM

## 2024-09-26 DIAGNOSIS — Z71.89 OTHER SPECIFIED COUNSELING: ICD-10-CM

## 2024-09-26 DIAGNOSIS — L81.4 OTHER MELANIN HYPERPIGMENTATION: ICD-10-CM

## 2024-09-26 DIAGNOSIS — D18.0 HEMANGIOMA: ICD-10-CM

## 2024-09-26 DIAGNOSIS — D22 MELANOCYTIC NEVI: ICD-10-CM

## 2024-09-26 DIAGNOSIS — Z12.83 ENCOUNTER FOR SCREENING FOR MALIGNANT NEOPLASM OF SKIN: ICD-10-CM

## 2024-09-26 PROBLEM — D22.71 MELANOCYTIC NEVI OF RIGHT LOWER LIMB, INCLUDING HIP: Status: ACTIVE | Noted: 2024-09-26

## 2024-09-26 PROBLEM — D22.5 MELANOCYTIC NEVI OF TRUNK: Status: ACTIVE | Noted: 2024-09-26

## 2024-09-26 PROBLEM — D18.01 HEMANGIOMA OF SKIN AND SUBCUTANEOUS TISSUE: Status: ACTIVE | Noted: 2024-09-26

## 2024-09-26 PROBLEM — D22.72 MELANOCYTIC NEVI OF LEFT LOWER LIMB, INCLUDING HIP: Status: ACTIVE | Noted: 2024-09-26

## 2024-09-26 PROBLEM — D22.61 MELANOCYTIC NEVI OF RIGHT UPPER LIMB, INCLUDING SHOULDER: Status: ACTIVE | Noted: 2024-09-26

## 2024-09-26 PROCEDURE — 99213 OFFICE O/P EST LOW 20 MIN: CPT

## 2024-09-26 PROCEDURE — ? TREATMENT REGIMEN

## 2024-09-26 PROCEDURE — ? COUNSELING

## 2024-09-26 PROCEDURE — ? FULL BODY SKIN EXAM

## 2024-09-26 PROCEDURE — ? SUNSCREEN RECOMMENDATIONS

## 2024-09-26 ASSESSMENT — LOCATION DETAILED DESCRIPTION DERM
LOCATION DETAILED: RIGHT SUPERIOR MEDIAL UPPER BACK
LOCATION DETAILED: MID-FRONTAL SCALP
LOCATION DETAILED: EPIGASTRIC SKIN
LOCATION DETAILED: RIGHT ANTERIOR DISTAL UPPER ARM
LOCATION DETAILED: RIGHT ANTERIOR DISTAL THIGH
LOCATION DETAILED: RIGHT VENTRAL DISTAL FOREARM
LOCATION DETAILED: LEFT DISTAL POSTERIOR THIGH
LOCATION DETAILED: RIGHT CLAVICULAR SKIN
LOCATION DETAILED: LEFT CENTRAL MALAR CHEEK
LOCATION DETAILED: RIGHT PROXIMAL DORSAL FOREARM
LOCATION DETAILED: UPPER STERNUM
LOCATION DETAILED: LEFT ANTERIOR DISTAL UPPER ARM
LOCATION DETAILED: SUPERIOR THORACIC SPINE
LOCATION DETAILED: LEFT PROXIMAL POSTERIOR THIGH
LOCATION DETAILED: LEFT INFERIOR UPPER BACK
LOCATION DETAILED: LEFT DISTAL CALF

## 2024-09-26 ASSESSMENT — LOCATION SIMPLE DESCRIPTION DERM
LOCATION SIMPLE: LEFT CHEEK
LOCATION SIMPLE: LEFT POSTERIOR THIGH
LOCATION SIMPLE: RIGHT UPPER ARM
LOCATION SIMPLE: ABDOMEN
LOCATION SIMPLE: RIGHT FOREARM
LOCATION SIMPLE: RIGHT CLAVICULAR SKIN
LOCATION SIMPLE: LEFT UPPER BACK
LOCATION SIMPLE: LEFT CALF
LOCATION SIMPLE: RIGHT UPPER BACK
LOCATION SIMPLE: CHEST
LOCATION SIMPLE: RIGHT THIGH
LOCATION SIMPLE: LEFT UPPER ARM
LOCATION SIMPLE: ANTERIOR SCALP
LOCATION SIMPLE: UPPER BACK

## 2024-09-26 ASSESSMENT — LOCATION ZONE DERM
LOCATION ZONE: SCALP
LOCATION ZONE: TRUNK
LOCATION ZONE: FACE
LOCATION ZONE: ARM
LOCATION ZONE: LEG

## 2024-10-31 RX ORDER — LOSARTAN POTASSIUM 25 MG/1
25 TABLET ORAL DAILY
Qty: 90 TABLET | Refills: 1 | Status: SHIPPED | OUTPATIENT
Start: 2024-10-31

## 2024-10-31 NOTE — TELEPHONE ENCOUNTER
Requested Prescriptions     Pending Prescriptions Disp Refills    losartan (COZAAR) 25 MG tablet 90 tablet 1     Sig: Take 1 tablet by mouth daily     Dose verified and to [facility]. Patient is scheduled for follow up visit.

## 2024-11-12 DIAGNOSIS — G89.29 CHRONIC BILATERAL LOW BACK PAIN WITHOUT SCIATICA: ICD-10-CM

## 2024-11-12 DIAGNOSIS — F51.04 CHRONIC INSOMNIA: ICD-10-CM

## 2024-11-12 DIAGNOSIS — M54.50 CHRONIC BILATERAL LOW BACK PAIN WITHOUT SCIATICA: ICD-10-CM

## 2024-11-12 RX ORDER — TRAMADOL HYDROCHLORIDE 50 MG/1
50 TABLET ORAL EVERY 8 HOURS PRN
Qty: 90 TABLET | Refills: 2 | Status: SHIPPED | OUTPATIENT
Start: 2024-11-12 | End: 2025-11-12

## 2024-11-12 RX ORDER — TRAZODONE HYDROCHLORIDE 100 MG/1
100 TABLET ORAL NIGHTLY PRN
Qty: 90 TABLET | Refills: 1 | Status: SHIPPED | OUTPATIENT
Start: 2024-11-12

## 2024-11-12 NOTE — TELEPHONE ENCOUNTER
Requested Prescriptions     Pending Prescriptions Disp Refills    traMADol (ULTRAM) 50 MG tablet 90 tablet 2     Sig: Take 1 tablet by mouth every 8 hours as needed for Pain. Intended supply: 7 days. Take lowest dose possible to manage pain Max Daily Amount: 150 mg    traZODone (DESYREL) 100 MG tablet 90 tablet 1     Sig: Take 1 tablet by mouth nightly as needed for Sleep     Dose verified and to [facility]. Patient is scheduled for follow up.   He is leaving for vacation this Thursday and would like to have this done today.

## 2025-01-08 ENCOUNTER — OFFICE VISIT (OUTPATIENT)
Dept: INTERNAL MEDICINE CLINIC | Facility: CLINIC | Age: 75
End: 2025-01-08

## 2025-01-08 VITALS
BODY MASS INDEX: 27.94 KG/M2 | SYSTOLIC BLOOD PRESSURE: 132 MMHG | HEIGHT: 67 IN | DIASTOLIC BLOOD PRESSURE: 70 MMHG | HEART RATE: 59 BPM | WEIGHT: 178 LBS

## 2025-01-08 DIAGNOSIS — I50.22 CHRONIC HFREF (HEART FAILURE WITH REDUCED EJECTION FRACTION) (HCC): ICD-10-CM

## 2025-01-08 DIAGNOSIS — Z00.00 MEDICARE ANNUAL WELLNESS VISIT, SUBSEQUENT: Primary | Chronic | ICD-10-CM

## 2025-01-08 DIAGNOSIS — E78.2 HYPERLIPIDEMIA, MIXED: ICD-10-CM

## 2025-01-08 DIAGNOSIS — I65.22 STENOSIS OF LEFT CAROTID ARTERY: ICD-10-CM

## 2025-01-08 DIAGNOSIS — F51.04 CHRONIC INSOMNIA: ICD-10-CM

## 2025-01-08 DIAGNOSIS — D64.9 CHRONIC ANEMIA: ICD-10-CM

## 2025-01-08 DIAGNOSIS — K63.5 POLYP OF COLON, UNSPECIFIED PART OF COLON, UNSPECIFIED TYPE: ICD-10-CM

## 2025-01-08 DIAGNOSIS — I10 HYPERTENSION, ESSENTIAL: ICD-10-CM

## 2025-01-08 DIAGNOSIS — N18.31 CHRONIC KIDNEY DISEASE, STAGE 3A (HCC): ICD-10-CM

## 2025-01-08 DIAGNOSIS — C61 PROSTATE CANCER (HCC): ICD-10-CM

## 2025-01-08 LAB
ALBUMIN SERPL-MCNC: 3.6 G/DL (ref 3.2–4.6)
ALBUMIN/GLOB SERPL: 1.3 (ref 1–1.9)
ALP SERPL-CCNC: 98 U/L (ref 40–129)
ALT SERPL-CCNC: 15 U/L (ref 8–55)
ANION GAP SERPL CALC-SCNC: 12 MMOL/L (ref 7–16)
AST SERPL-CCNC: 20 U/L (ref 15–37)
BASOPHILS # BLD: 0.08 K/UL (ref 0–0.2)
BASOPHILS NFR BLD: 1.1 % (ref 0–2)
BILIRUB SERPL-MCNC: 0.3 MG/DL (ref 0–1.2)
BUN SERPL-MCNC: 29 MG/DL (ref 8–23)
CALCIUM SERPL-MCNC: 10.1 MG/DL (ref 8.8–10.2)
CHLORIDE SERPL-SCNC: 106 MMOL/L (ref 98–107)
CHOLEST SERPL-MCNC: 200 MG/DL (ref 0–200)
CO2 SERPL-SCNC: 24 MMOL/L (ref 20–29)
CREAT SERPL-MCNC: 1.53 MG/DL (ref 0.8–1.3)
DIFFERENTIAL METHOD BLD: ABNORMAL
EOSINOPHIL # BLD: 0.78 K/UL (ref 0–0.8)
EOSINOPHIL NFR BLD: 10.6 % (ref 0.5–7.8)
ERYTHROCYTE [DISTWIDTH] IN BLOOD BY AUTOMATED COUNT: 15.4 % (ref 11.9–14.6)
GLOBULIN SER CALC-MCNC: 2.8 G/DL (ref 2.3–3.5)
GLUCOSE SERPL-MCNC: 90 MG/DL (ref 70–99)
HCT VFR BLD AUTO: 41.7 % (ref 41.1–50.3)
HDLC SERPL-MCNC: 33 MG/DL (ref 40–60)
HDLC SERPL: 6 (ref 0–5)
HGB BLD-MCNC: 13 G/DL (ref 13.6–17.2)
IMM GRANULOCYTES # BLD AUTO: 0.06 K/UL (ref 0–0.5)
IMM GRANULOCYTES NFR BLD AUTO: 0.8 % (ref 0–5)
LDLC SERPL CALC-MCNC: 134 MG/DL (ref 0–100)
LYMPHOCYTES # BLD: 1.48 K/UL (ref 0.5–4.6)
LYMPHOCYTES NFR BLD: 20.2 % (ref 13–44)
MCH RBC QN AUTO: 26.5 PG (ref 26.1–32.9)
MCHC RBC AUTO-ENTMCNC: 31.2 G/DL (ref 31.4–35)
MCV RBC AUTO: 85.1 FL (ref 82–102)
MONOCYTES # BLD: 0.69 K/UL (ref 0.1–1.3)
MONOCYTES NFR BLD: 9.4 % (ref 4–12)
NEUTS SEG # BLD: 4.25 K/UL (ref 1.7–8.2)
NEUTS SEG NFR BLD: 57.9 % (ref 43–78)
NRBC # BLD: 0 K/UL (ref 0–0.2)
PLATELET # BLD AUTO: 213 K/UL (ref 150–450)
PMV BLD AUTO: 10.9 FL (ref 9.4–12.3)
POTASSIUM SERPL-SCNC: 4.7 MMOL/L (ref 3.5–5.1)
PROT SERPL-MCNC: 6.4 G/DL (ref 6.3–8.2)
RBC # BLD AUTO: 4.9 M/UL (ref 4.23–5.6)
SODIUM SERPL-SCNC: 142 MMOL/L (ref 136–145)
TRIGL SERPL-MCNC: 165 MG/DL (ref 0–150)
TSH W FREE THYROID IF ABNORMAL: 2.44 UIU/ML (ref 0.27–4.2)
VLDLC SERPL CALC-MCNC: 33 MG/DL (ref 6–23)
WBC # BLD AUTO: 7.3 K/UL (ref 4.3–11.1)

## 2025-01-08 SDOH — ECONOMIC STABILITY: FOOD INSECURITY: WITHIN THE PAST 12 MONTHS, THE FOOD YOU BOUGHT JUST DIDN'T LAST AND YOU DIDN'T HAVE MONEY TO GET MORE.: NEVER TRUE

## 2025-01-08 SDOH — ECONOMIC STABILITY: FOOD INSECURITY: WITHIN THE PAST 12 MONTHS, YOU WORRIED THAT YOUR FOOD WOULD RUN OUT BEFORE YOU GOT MONEY TO BUY MORE.: NEVER TRUE

## 2025-01-08 ASSESSMENT — LIFESTYLE VARIABLES
HOW MANY STANDARD DRINKS CONTAINING ALCOHOL DO YOU HAVE ON A TYPICAL DAY: 1 OR 2
HOW OFTEN DO YOU HAVE A DRINK CONTAINING ALCOHOL: MONTHLY OR LESS

## 2025-01-08 ASSESSMENT — PATIENT HEALTH QUESTIONNAIRE - PHQ9: DEPRESSION UNABLE TO ASSESS: PT REFUSES

## 2025-01-08 NOTE — PROGRESS NOTES
FOLLOWUP VISIT and MEDICARE WELLNESS    Subjective:    Mr. Waggoner is a 74 y.o., male,   Chief Complaint   Patient presents with    Medicare AWV    6 Month Follow-Up    Medication Adjustment     Atorvastatin        HPI:    Patient presents today for follow up of two or more chronic medical problems and review of labs.       The patient is also here for the annual wellness visit.     The patient has hypertension.  The patient has been on an attempted low sodium diet and has been trying to exercise and maintain a healthy weight.  The patient reports good compliance with the blood pressure medications.       The patient has hyperlipidemia.  He states that he developed myalgias on atorvastatin so he stopped taking it.  He states that his myalgias have improved off atorvastatin.  He would like Repatha instead.      The patient has coronary artery disease.  The patient has been attempting to follow a heart healthy diet and exercise.  The patient denies chest pain, shortness of breath, dyspnea on exertion, or PND.       The following portions of the patient's history were reviewed and updated as appropriate:      Past Medical History:   Diagnosis Date    AION (acute ischemic optic neuropathy), bilateral     with residual right central vision loss    Anemia     Anxiety     CAD (coronary artery disease)     S/P percutaneous coronary angioplasty    Carotid artery disease (HCC)     Chronic HFrEF (heart failure with reduced ejection fraction) ([facility])     CKD (chronic kidney disease)     Former smoker, stopped smoking in distant past     Quit 1971 after 1 ppd x 3 years.    Hepatitis A 1980    History of coccidioidomycosis     \"valley fever\"    History of non-ST elevation myocardial infarction (NSTEMI) 10/2020    S/P stent x 2    Hypercholesterolemia     Hypertension     Prostate cancer (HCC)     S/P prostatectomy in 2009    Sinus bradycardia, chronic     Wears hearing aid in both ears        Past Surgical History:   Procedure

## 2025-01-09 ENCOUNTER — TELEPHONE (OUTPATIENT)
Dept: INTERNAL MEDICINE CLINIC | Facility: CLINIC | Age: 75
End: 2025-01-09

## 2025-01-09 DIAGNOSIS — D64.9 ANEMIA, UNSPECIFIED TYPE: Primary | ICD-10-CM

## 2025-01-09 NOTE — TELEPHONE ENCOUNTER
----- Message from Dr. Paulo Leslie MD sent at 1/9/2025  1:59 PM EST -----  Call patient.    1.  He has mild borderline anemia.  Please order repeat CBC, B12, folate, iron and TIBC in one month.  Diagnosis anemia.   2.  His cholesterol is elevated off statin therapy.  Follow up with his cardiologist as planned for Repatha.    3.  Schedule follow up visit with me in 5 week to review his anemia labs.  Please ask patient to have labs done one week BEFORE visit.

## 2025-01-09 NOTE — RESULT ENCOUNTER NOTE
Call patient.    1.  He has mild borderline anemia.  Please order repeat CBC, B12, folate, iron and TIBC in one month.  Diagnosis anemia.   2.  His cholesterol is elevated off statin therapy.  Follow up with his cardiologist as planned for Repatha.    3.  Schedule follow up visit with me in 5 week to review his anemia labs.  Please ask patient to have labs done one week BEFORE visit.

## 2025-01-13 PROBLEM — Z00.00 MEDICARE ANNUAL WELLNESS VISIT, SUBSEQUENT: Chronic | Status: ACTIVE | Noted: 2025-01-13

## 2025-02-10 DIAGNOSIS — D64.9 ANEMIA, UNSPECIFIED TYPE: ICD-10-CM

## 2025-02-10 LAB
BASOPHILS # BLD: 0.07 K/UL (ref 0–0.2)
BASOPHILS NFR BLD: 0.9 % (ref 0–2)
DIFFERENTIAL METHOD BLD: ABNORMAL
EOSINOPHIL # BLD: 0.17 K/UL (ref 0–0.8)
EOSINOPHIL NFR BLD: 2.2 % (ref 0.5–7.8)
ERYTHROCYTE [DISTWIDTH] IN BLOOD BY AUTOMATED COUNT: 14.6 % (ref 11.9–14.6)
FOLATE SERPL-MCNC: 12.4 NG/ML (ref 3.1–17.5)
HCT VFR BLD AUTO: 41.9 % (ref 41.1–50.3)
HGB BLD-MCNC: 13.4 G/DL (ref 13.6–17.2)
IMM GRANULOCYTES # BLD AUTO: 0.04 K/UL (ref 0–0.5)
IMM GRANULOCYTES NFR BLD AUTO: 0.5 % (ref 0–5)
IRON SATN MFR SERPL: 9 % (ref 20–50)
IRON SERPL-MCNC: 30 UG/DL (ref 35–100)
LYMPHOCYTES # BLD: 1.17 K/UL (ref 0.5–4.6)
LYMPHOCYTES NFR BLD: 15.3 % (ref 13–44)
MCH RBC QN AUTO: 26.7 PG (ref 26.1–32.9)
MCHC RBC AUTO-ENTMCNC: 32 G/DL (ref 31.4–35)
MCV RBC AUTO: 83.5 FL (ref 82–102)
MONOCYTES # BLD: 0.5 K/UL (ref 0.1–1.3)
MONOCYTES NFR BLD: 6.5 % (ref 4–12)
NEUTS SEG # BLD: 5.69 K/UL (ref 1.7–8.2)
NEUTS SEG NFR BLD: 74.6 % (ref 43–78)
NRBC # BLD: 0 K/UL (ref 0–0.2)
PLATELET # BLD AUTO: 175 K/UL (ref 150–450)
PMV BLD AUTO: 11.8 FL (ref 9.4–12.3)
RBC # BLD AUTO: 5.02 M/UL (ref 4.23–5.6)
TIBC SERPL-MCNC: 348 UG/DL (ref 240–450)
UIBC SERPL-MCNC: 318 UG/DL (ref 112–347)
VIT B12 SERPL-MCNC: 348 PG/ML (ref 193–986)
WBC # BLD AUTO: 7.6 K/UL (ref 4.3–11.1)

## 2025-02-13 ENCOUNTER — OFFICE VISIT (OUTPATIENT)
Dept: INTERNAL MEDICINE CLINIC | Facility: CLINIC | Age: 75
End: 2025-02-13

## 2025-02-13 VITALS
HEART RATE: 50 BPM | DIASTOLIC BLOOD PRESSURE: 56 MMHG | BODY MASS INDEX: 27.67 KG/M2 | HEIGHT: 67 IN | SYSTOLIC BLOOD PRESSURE: 124 MMHG

## 2025-02-13 DIAGNOSIS — K63.5 POLYP OF COLON, UNSPECIFIED PART OF COLON, UNSPECIFIED TYPE: ICD-10-CM

## 2025-02-13 DIAGNOSIS — D50.9 IRON DEFICIENCY ANEMIA, UNSPECIFIED IRON DEFICIENCY ANEMIA TYPE: Primary | ICD-10-CM

## 2025-02-13 RX ORDER — FERROUS SULFATE 325(65) MG
325 TABLET ORAL 2 TIMES DAILY
COMMUNITY
Start: 2025-02-13

## 2025-02-13 ASSESSMENT — ENCOUNTER SYMPTOMS
STRIDOR: 0
RECTAL PAIN: 0
EYE PAIN: 0
VOICE CHANGE: 0
CHOKING: 0

## 2025-02-13 NOTE — PROGRESS NOTES
FOLLOWUP VISIT    Subjective:    Mr. Waggoner is a 74 y.o., male,   Chief Complaint   Patient presents with    1 Month Follow-Up       HPI:    Patent is here to review his recent anemia labs.  Denies any GI or anemia symptoms.      The following portions of the patient's history were reviewed and updated as appropriate:      Past Medical History:   Diagnosis Date    AION (acute ischemic optic neuropathy), bilateral     with residual right central vision loss    Anemia     Anxiety     CAD (coronary artery disease)     S/P percutaneous coronary angioplasty    Carotid artery disease (HCC)     Chronic HFrEF (heart failure with reduced ejection fraction) (HCC)     CKD (chronic kidney disease)     Former smoker, stopped smoking in distant past     Quit 1971 after 1 ppd x 3 years.    Hepatitis A 1980    History of coccidioidomycosis     \"valley fever\"    History of non-ST elevation myocardial infarction (NSTEMI) 10/2020    S/P stent x 2    Hypercholesterolemia     Hypertension     Prostate cancer (HCC)     S/P prostatectomy in 2009    Sinus bradycardia, chronic     Wears hearing aid in both ears        Past Surgical History:   Procedure Laterality Date    CARDIAC CATHETERIZATION  2011    no intervention, normal    CARDIAC CATHETERIZATION  10/10/2020    stents x 2    CARDIAC CATHETERIZATION  08/05/2021    Left heart catheterization with PCI    FOOT DEBRIDEMENT Right 08/02/2023    Right calf hematoma evacuation performed by Graham Sheets MD at [facility]    PROSTATE SURGERY      PROSTATECTOMY 2009    ROTATOR CUFF REPAIR Right 10/09/2020    at [facility]    TONSILLECTOMY AND ADENOIDECTOMY         Family History   Problem Relation Age of Onset    Heart Disease Father     Cancer Sister     Heart Disease Sister     Diabetes Mother        Social History     Socioeconomic History    Marital status:      Spouse name: Not on file    Number of children: 8    Years of education: Not on file    Highest education level: Not on file

## 2025-04-10 DIAGNOSIS — D50.9 IRON DEFICIENCY ANEMIA, UNSPECIFIED IRON DEFICIENCY ANEMIA TYPE: ICD-10-CM

## 2025-04-10 LAB
BASOPHILS # BLD: 0.07 K/UL (ref 0–0.2)
BASOPHILS NFR BLD: 1 % (ref 0–2)
DIFFERENTIAL METHOD BLD: ABNORMAL
EOSINOPHIL # BLD: 0.11 K/UL (ref 0–0.8)
EOSINOPHIL NFR BLD: 1.6 % (ref 0.5–7.8)
ERYTHROCYTE [DISTWIDTH] IN BLOOD BY AUTOMATED COUNT: 15.7 % (ref 11.9–14.6)
FERRITIN SERPL-MCNC: 49 NG/ML (ref 8–388)
HCT VFR BLD AUTO: 48.9 % (ref 41.1–50.3)
HGB BLD-MCNC: 15.8 G/DL (ref 13.6–17.2)
IMM GRANULOCYTES # BLD AUTO: 0.03 K/UL (ref 0–0.5)
IMM GRANULOCYTES NFR BLD AUTO: 0.4 % (ref 0–5)
IRON SATN MFR SERPL: 30 % (ref 20–50)
IRON SERPL-MCNC: 95 UG/DL (ref 35–100)
LYMPHOCYTES # BLD: 1.05 K/UL (ref 0.5–4.6)
LYMPHOCYTES NFR BLD: 15.6 % (ref 13–44)
MCH RBC QN AUTO: 28.5 PG (ref 26.1–32.9)
MCHC RBC AUTO-ENTMCNC: 32.3 G/DL (ref 31.4–35)
MCV RBC AUTO: 88.1 FL (ref 82–102)
MONOCYTES # BLD: 0.59 K/UL (ref 0.1–1.3)
MONOCYTES NFR BLD: 8.8 % (ref 4–12)
NEUTS SEG # BLD: 4.86 K/UL (ref 1.7–8.2)
NEUTS SEG NFR BLD: 72.6 % (ref 43–78)
NRBC # BLD: 0 K/UL (ref 0–0.2)
PLATELET # BLD AUTO: 149 K/UL (ref 150–450)
PMV BLD AUTO: 12 FL (ref 9.4–12.3)
RBC # BLD AUTO: 5.55 M/UL (ref 4.23–5.6)
TIBC SERPL-MCNC: 319 UG/DL (ref 240–450)
UIBC SERPL-MCNC: 224 UG/DL (ref 112–347)
WBC # BLD AUTO: 6.7 K/UL (ref 4.3–11.1)

## 2025-04-15 ENCOUNTER — OFFICE VISIT (OUTPATIENT)
Dept: INTERNAL MEDICINE CLINIC | Facility: CLINIC | Age: 75
End: 2025-04-15
Payer: MEDICARE

## 2025-04-15 VITALS
WEIGHT: 178 LBS | OXYGEN SATURATION: 96 % | SYSTOLIC BLOOD PRESSURE: 142 MMHG | BODY MASS INDEX: 27.94 KG/M2 | HEIGHT: 67 IN | HEART RATE: 65 BPM | DIASTOLIC BLOOD PRESSURE: 70 MMHG

## 2025-04-15 DIAGNOSIS — K63.5 POLYP OF COLON, UNSPECIFIED PART OF COLON, UNSPECIFIED TYPE: ICD-10-CM

## 2025-04-15 DIAGNOSIS — D50.9 IRON DEFICIENCY ANEMIA, UNSPECIFIED IRON DEFICIENCY ANEMIA TYPE: Primary | ICD-10-CM

## 2025-04-15 DIAGNOSIS — I50.22 CHRONIC HFREF (HEART FAILURE WITH REDUCED EJECTION FRACTION) (HCC): ICD-10-CM

## 2025-04-15 DIAGNOSIS — R91.1 LUNG NODULE: ICD-10-CM

## 2025-04-15 DIAGNOSIS — E78.2 HYPERLIPIDEMIA, MIXED: ICD-10-CM

## 2025-04-15 DIAGNOSIS — N18.31 CHRONIC KIDNEY DISEASE, STAGE 3A (HCC): ICD-10-CM

## 2025-04-15 DIAGNOSIS — C61 PROSTATE CANCER (HCC): ICD-10-CM

## 2025-04-15 PROCEDURE — 3077F SYST BP >= 140 MM HG: CPT | Performed by: INTERNAL MEDICINE

## 2025-04-15 PROCEDURE — 3017F COLORECTAL CA SCREEN DOC REV: CPT | Performed by: INTERNAL MEDICINE

## 2025-04-15 PROCEDURE — 99214 OFFICE O/P EST MOD 30 MIN: CPT | Performed by: INTERNAL MEDICINE

## 2025-04-15 PROCEDURE — 1123F ACP DISCUSS/DSCN MKR DOCD: CPT | Performed by: INTERNAL MEDICINE

## 2025-04-15 PROCEDURE — 1159F MED LIST DOCD IN RCRD: CPT | Performed by: INTERNAL MEDICINE

## 2025-04-15 PROCEDURE — G8417 CALC BMI ABV UP PARAM F/U: HCPCS | Performed by: INTERNAL MEDICINE

## 2025-04-15 PROCEDURE — G2211 COMPLEX E/M VISIT ADD ON: HCPCS | Performed by: INTERNAL MEDICINE

## 2025-04-15 PROCEDURE — 1036F TOBACCO NON-USER: CPT | Performed by: INTERNAL MEDICINE

## 2025-04-15 PROCEDURE — 3078F DIAST BP <80 MM HG: CPT | Performed by: INTERNAL MEDICINE

## 2025-04-15 PROCEDURE — G8427 DOCREV CUR MEDS BY ELIG CLIN: HCPCS | Performed by: INTERNAL MEDICINE

## 2025-04-15 ASSESSMENT — ENCOUNTER SYMPTOMS
STRIDOR: 0
VOICE CHANGE: 0
RECTAL PAIN: 0
CHOKING: 0
EYE PAIN: 0

## 2025-04-15 ASSESSMENT — PATIENT HEALTH QUESTIONNAIRE - PHQ9
SUM OF ALL RESPONSES TO PHQ QUESTIONS 1-9: 0
2. FEELING DOWN, DEPRESSED OR HOPELESS: NOT AT ALL
1. LITTLE INTEREST OR PLEASURE IN DOING THINGS: NOT AT ALL
SUM OF ALL RESPONSES TO PHQ QUESTIONS 1-9: 0

## 2025-04-15 NOTE — PROGRESS NOTES
FOLLOWUP VISIT    Subjective:    Mr. Waggoner is a 75 y.o., male,   Chief Complaint   Patient presents with    Follow-up       HPI:    Patient presents today for follow up of two or more chronic medical problems and review of labs.       The patient has iron deficiency anemia diagnosed in 2/2025.  He has been compliant with OTC ferrous sulfate 325 mg BID.  He has no anemia symptoms or clinical bleeding.  He was non-compliant with gastroenterology referral as he does not like the colonoscopy preparation and hoped that if his REBECCA resolved with iron therapy he might not need further evaluation.      The patient has hypertension.  The patient has been on an attempted low sodium diet and has been trying to exercise and maintain a healthy weight.  The patient reports good compliance with the blood pressure medications.       Interval history  and review of symptoms otherwise unchanged.       The following portions of the patient's history were reviewed and updated as appropriate:      Past Medical History:   Diagnosis Date    AION (acute ischemic optic neuropathy), bilateral     with residual right central vision loss    Anemia     Anxiety     CAD (coronary artery disease)     S/P percutaneous coronary angioplasty    Carotid artery disease     Chronic HFrEF (heart failure with reduced ejection fraction) ([facility])     CKD (chronic kidney disease)     Former smoker, stopped smoking in distant past     Quit 1971 after 1 ppd x 3 years.    Hepatitis A 1980    History of coccidioidomycosis     \"valley fever\"    History of non-ST elevation myocardial infarction (NSTEMI) 10/2020    S/P stent x 2    Hypercholesterolemia     Hypertension     Prostate cancer (HCC)     S/P prostatectomy in 2009    Sinus bradycardia, chronic     Wears hearing aid in both ears        Past Surgical History:   Procedure Laterality Date    CARDIAC CATHETERIZATION  2011    no intervention, normal    CARDIAC CATHETERIZATION  10/10/2020    stents x 2    CARDIAC

## 2025-05-12 ENCOUNTER — RESULTS FOLLOW-UP (OUTPATIENT)
Dept: INTERNAL MEDICINE CLINIC | Facility: CLINIC | Age: 75
End: 2025-05-12

## 2025-05-13 NOTE — TELEPHONE ENCOUNTER
----- Message from Dr. Paulo Leslie MD sent at 5/12/2025  9:44 PM EDT -----  Call patient.  He has probably already reviewed his CT scan but it looks like this 1.2 cm nodule which he states has been present for > 20 years has not changed.  Will discuss next visit in July.

## 2025-05-13 NOTE — TELEPHONE ENCOUNTER
Spoke with patient.   Patient is schedule with [facility] on 06/30/2025 ad he is scheduled for follow up visit with us on 07/09/2025. He would like to know if he should delay an appointment with us?

## 2025-05-13 NOTE — TELEPHONE ENCOUNTER
----- Message from Dr. Paulo Leslie MD sent at 5/13/2025  9:54 AM EDT -----  Ideally I would see him after he has the tests performed by gastroenterology.  Maybe reschedule for 9/2025?

## 2025-05-13 NOTE — RESULT ENCOUNTER NOTE
Ideally I would see him after he has the tests performed by gastroenterology.  Maybe reschedule for 9/2025?

## 2025-05-13 NOTE — RESULT ENCOUNTER NOTE
Call patient.  He has probably already reviewed his CT scan but it looks like this 1.2 cm nodule which he states has been present for > 20 years has not changed.  Will discuss next visit in July.

## 2025-05-16 DIAGNOSIS — G89.29 CHRONIC BILATERAL LOW BACK PAIN WITHOUT SCIATICA: ICD-10-CM

## 2025-05-16 DIAGNOSIS — F51.04 CHRONIC INSOMNIA: ICD-10-CM

## 2025-05-16 DIAGNOSIS — M54.50 CHRONIC BILATERAL LOW BACK PAIN WITHOUT SCIATICA: ICD-10-CM

## 2025-05-16 RX ORDER — TRAMADOL HYDROCHLORIDE 50 MG/1
50 TABLET ORAL EVERY 8 HOURS PRN
Qty: 90 TABLET | Refills: 2 | Status: SHIPPED | OUTPATIENT
Start: 2025-05-16 | End: 2026-05-16

## 2025-05-16 RX ORDER — TRAZODONE HYDROCHLORIDE 100 MG/1
100 TABLET ORAL NIGHTLY PRN
Qty: 90 TABLET | Refills: 1 | Status: SHIPPED | OUTPATIENT
Start: 2025-05-16

## 2025-05-16 NOTE — TELEPHONE ENCOUNTER
Requested Prescriptions     Pending Prescriptions Disp Refills    traMADol (ULTRAM) 50 MG tablet 90 tablet 2     Sig: Take 1 tablet by mouth every 8 hours as needed for Pain. Intended supply: 7 days. Take lowest dose possible to manage pain Max Daily Amount: 150 mg    traZODone (DESYREL) 100 MG tablet 90 tablet 1     Sig: Take 1 tablet by mouth nightly as needed for Sleep     Dose verified and to [place]. Patient is scheduled for follow up visit.

## 2025-05-27 RX ORDER — LOSARTAN POTASSIUM 25 MG/1
25 TABLET ORAL DAILY
Qty: 90 TABLET | Refills: 1 | Status: SHIPPED | OUTPATIENT
Start: 2025-05-27

## 2025-06-02 ENCOUNTER — OFFICE VISIT (OUTPATIENT)
Age: 75
End: 2025-06-02
Payer: MEDICARE

## 2025-06-02 DIAGNOSIS — M47.817 LUMBOSACRAL SPONDYLOSIS WITHOUT MYELOPATHY: Primary | ICD-10-CM

## 2025-06-02 PROCEDURE — G8417 CALC BMI ABV UP PARAM F/U: HCPCS | Performed by: ANESTHESIOLOGY

## 2025-06-02 PROCEDURE — 3017F COLORECTAL CA SCREEN DOC REV: CPT | Performed by: ANESTHESIOLOGY

## 2025-06-02 PROCEDURE — 99213 OFFICE O/P EST LOW 20 MIN: CPT | Performed by: ANESTHESIOLOGY

## 2025-06-02 PROCEDURE — 1123F ACP DISCUSS/DSCN MKR DOCD: CPT | Performed by: ANESTHESIOLOGY

## 2025-06-02 PROCEDURE — 1036F TOBACCO NON-USER: CPT | Performed by: ANESTHESIOLOGY

## 2025-06-02 PROCEDURE — G8428 CUR MEDS NOT DOCUMENT: HCPCS | Performed by: ANESTHESIOLOGY

## 2025-06-02 RX ORDER — HYDRALAZINE HYDROCHLORIDE 25 MG/1
25 TABLET, FILM COATED ORAL 3 TIMES DAILY
Qty: 270 TABLET | Refills: 1 | Status: SHIPPED | OUTPATIENT
Start: 2025-06-02

## 2025-06-02 NOTE — PROGRESS NOTES
Chronic Pain Consult Note      Plan:     A comprehensive pain management plan may consist of the following: Testing, Therapy, Medications, Interventions, Consults, and Follow up.    Lumbosacral spondylosis without myelopathy  Schedule repeat bilateral L4 through SA MB RFA.  Previous RFA provided 100% relief for 9 months  Can continue Flexeril 5 mg 3 times daily as needed.  Spent time discussing general side effects concerns medication.  Lumbar degenerative disc disease  Patient scheduled to be evaluated with Dr. Shelton this coming June  We will continue to monitor for further interventional therapy if warranted  Lumbar stenosis  Plan as above  Chronic low back pain  Encourage continuation of active lifestyle  Patient receives tramadol 50 mg 3 times daily as needed dosing from outside prescriber.  Patient reports taking medication as directed.  Patient reports significant benefit in function and pain with use of this medication.  He however would like to discontinue this medication as soon as possible with utilization of alternative modalities.    General Recommendations: The pain condition that the patient suffers from is best treated with a multidisciplinary approach that involves an increase in physical activity to prevent de-conditioning and worsening of the pain cycle, as well as psychological counseling (formal and/or informal) to address the co morbid psychological effects of pain. Treatment will often involve judicious use of pain medications and interventional procedures to decrease the pain, allowing the patient to participate in the physical activity that will ultimately produce long-lasting pain reductions. The goal of the multidisciplinary approach is to return the patient to a higher level of overall function and to restore their ability to perform activities of daily living.      Referring Provider: No ref. provider found  Assessment:      Chief Complaint: Follow Up After Procedure      Dionisio Garcia

## 2025-06-02 NOTE — TELEPHONE ENCOUNTER
Requested Prescriptions     Pending Prescriptions Disp Refills    hydrALAZINE (APRESOLINE) 25 MG tablet 270 tablet 1     Sig: Take 1 tablet by mouth 3 times daily     Dose verified and to [place]. Patient is scheduled for follow up visit.

## 2025-06-20 NOTE — PROGRESS NOTES
Location: [facility]       Procedure: BILAT L4-SA RFA       Time Out performed prior to start of the procedure:       Nikolai Laguerre MD performed the following reviews on Dionisio Waggoner 1950 prior to the start of the procedure:       patient was identified by name and     agreement on procedure being performed was verified   risks and benefits explained to patient by the provider  procedure site verified as Bilateral  patient was positioned for comfort   consent signed and verified for procedure             Procedure performed by:   Nikolai Laguerre MD      Patient assisted by:   HONEY AMOR

## 2025-06-23 ENCOUNTER — OFFICE VISIT (OUTPATIENT)
Age: 75
End: 2025-06-23
Payer: MEDICARE

## 2025-06-23 DIAGNOSIS — M47.817 LUMBOSACRAL SPONDYLOSIS WITHOUT MYELOPATHY: Primary | ICD-10-CM

## 2025-06-23 PROCEDURE — 64635 DESTROY LUMB/SAC FACET JNT: CPT | Performed by: ANESTHESIOLOGY

## 2025-06-23 PROCEDURE — 64636 DESTROY L/S FACET JNT ADDL: CPT | Performed by: ANESTHESIOLOGY

## 2025-06-23 NOTE — PROGRESS NOTES
NAME: Dionisio Waggoner   ID:[id number]   :1950    Location: [id number]    Procedure:  Lumbar Medial Branch Radiofrequency Ablation at L4, LS, SA Under Fluoroscopic Imaging      Pre-op Diagnosis: Lumbar Spondylosis without Myelopathy    Post-op Diagnosis: Same     Anesthesia: Local only    Complications: None    After confirming written and informed consent and discussing the risk, benefits and alternatives for the procedure, the patient had the correct site marked by the physician performing the procedure. The specific risks of bleeding and infection were discussed, as was the specific risk of neuritis. The patient was taken to the fluoroscopy suite. A pulse oximeter was placed, and verbal and visual monitoring were maintained throughout the procedure. The patient was then placed in the prone position. The skin overlying the lumbo-sacral spine was then prepped with chlorhexidine gluconate and a sterile drape was placed. A hat and mask were worn, and the hands were cleaned with an alcohol-containing solution. Sterile gloves were then worn. A time out was then performed involving the physician, radiation technologist and the patient.    A 20 G, 10 cm radiofrequency cannula with a 10 mm active tip was used at each level. Next, the anatomy of the lumbar spine was then identified using fluoroscopic guidance.    An oblique view of the L4 transverse process was then utilized to identify the junction of the junction of the transverse process and superior articulating process. The skin overlying this area was anesthetized with 0.2 ml of 1% lidocaine using a 25 G 1.5 inch needle. Next, using intermittent fluoroscopic guidance, the radiofrequency ablation cannula was advanced toward the junction of the transverse process and superior articulating process, favoring the cephalad aspect of this junction and along the course of the medial branch. Satisfactory needle position was confirmed on AP, lateral, and oblique

## 2025-09-02 ENCOUNTER — TELEPHONE (OUTPATIENT)
Dept: INTERNAL MEDICINE CLINIC | Facility: CLINIC | Age: 75
End: 2025-09-02

## (undated) DEVICE — OUTFLOW CASSETTE TUBING, DO NOT USE IF PACKAGE IS DAMAGED: Brand: CROSSFLOW

## (undated) DEVICE — CATH GUID COR EB35 5FR 100CM -- LAUNCHER

## (undated) DEVICE — ULTRATAPE SUTURE COBRAID BLUE, 6                                    PER BOX: Brand: ULTRATAPE

## (undated) DEVICE — 3M™ IOBAN™ 2 ANTIMICROBIAL INCISE DRAPE 6650EZ: Brand: IOBAN™ 2

## (undated) DEVICE — GLIDESHEATH SLENDER STAINLESS STEEL KIT: Brand: GLIDESHEATH SLENDER

## (undated) DEVICE — DRAPE SHT 3 QTR PROXIMA 53X77 --

## (undated) DEVICE — IMMOBILIZER ORTH LIGHTWEIGHT LG UNIV 9X7 IN PREMIERPRO

## (undated) DEVICE — FIRSTPASS ST SUTURE PASSER, STANDARD: Brand: FIRSTPASS

## (undated) DEVICE — Z DUPLICATE USE 2103554 VALVE HEMOSTATIC BLEEDBK CTRL COPILOT

## (undated) DEVICE — [AGGRESSIVE 6-FLUTE BARREL BUR, ARTHROSCOPIC SHAVER BLADE,  DO NOT RESTERILIZE,  DO NOT USE IF PACKAGE IS DAMAGED,  KEEP DRY,  KEEP AWAY FROM SUNLIGHT]: Brand: FORMULA

## (undated) DEVICE — CATHETER GUID 5FR GWIRE 0.058IN COR EXTRA BKUP SUPP 4 ACT

## (undated) DEVICE — DRAPE TAPE: Brand: CONVERTORS

## (undated) DEVICE — INTENDED FOR TISSUE SEPARATION, AND OTHER PROCEDURES THAT REQUIRE A SHARP SURGICAL BLADE TO PUNCTURE OR CUT.: Brand: BARD-PARKER ® STAINLESS STEEL BLADES

## (undated) DEVICE — NDL SPNE QNCKE 18GX3.5IN LF --

## (undated) DEVICE — INFLOW CASSETTE TUBING, DO NOT USE IF PACKAGE IS DAMAGED: Brand: CROSSFLOW

## (undated) DEVICE — RUNTHROUGH NS EXTRA FLOPPY PTCA GUIDEWIRE: Brand: RUNTHROUGH

## (undated) DEVICE — GLOVE SURG SZ 8 L12IN FNGR THK79MIL GRN LTX FREE

## (undated) DEVICE — DRAIN SURG PENROSE 0.25X12 IN CLOSED WND DRAINAGE PREM SIL

## (undated) DEVICE — ACUFEX ACCESS POSITIONING KIT: Brand: ACUFEX

## (undated) DEVICE — ZIMMER® STERILE DISPOSABLE TOURNIQUET CUFF WITH PLC, DUAL PORT, SINGLE BLADDER, 18 IN. (46 CM)

## (undated) DEVICE — CANNULA THREADED FLEX 6.5 X 72MM: Brand: CLEAR-TRAC

## (undated) DEVICE — BASIC SINGLE BASIN-LF: Brand: MEDLINE INDUSTRIES, INC.

## (undated) DEVICE — 4-PORT MANIFOLD: Brand: NEPTUNE 2

## (undated) DEVICE — SPONGE LAPAROTOMY W18XL18IN WHITE STRUNG RADIOPAQUE STERILE

## (undated) DEVICE — STOCKINETTE,IMPERVIOUS,12X48,STERILE: Brand: MEDLINE

## (undated) DEVICE — GLOVE SURG SZ 85 L12IN FNGR ORTHO 126MIL CRM LTX FREE

## (undated) DEVICE — CLEAR-TRAC 7.0 MM X 72 MM THREADED                                    CANNULA, WITH DISPOSABLE OBTURATOR,                                    GREY, STERILE: Brand: CLEAR-TRAC

## (undated) DEVICE — PREP SKN CHLRAPRP APL 26ML STR --

## (undated) DEVICE — PTCA DILATATION CATHETER: Brand: NC QUANTUM APEX™

## (undated) DEVICE — ULTRATAPE 2MM BLUE 38 PKG OF 6

## (undated) DEVICE — GLOVE ORANGE PI 7 1/2   MSG9075

## (undated) DEVICE — SOLUTION IRRIG 3000ML 0.9% SOD CHL FLX CONT 0797208] ICU MEDICAL INC]

## (undated) DEVICE — CLOTH PRE OP W9XL10.5IN 2% CHG FRAGRANCE RNS FREE READYPREP

## (undated) DEVICE — SYR 50ML LR LCK 1ML GRAD NSAF --

## (undated) DEVICE — PACK,SHOULDER,DRAPE,POUCH: Brand: MEDLINE

## (undated) DEVICE — GLOVE ORTHO 8   MSG9480

## (undated) DEVICE — CATHETER DIAG 6FR L100CM GWIRE 0.038IN S STL POLYUR MP W/ 2

## (undated) DEVICE — SHOULDER ARTHRO DR BAUMGARTEN: Brand: MEDLINE INDUSTRIES, INC.

## (undated) DEVICE — GUIDEWIRE 035IN 210CM PTFE COAT FIX COR J TIP 15MM FIRM BODY

## (undated) DEVICE — RADIFOCUS GLIDEWIRE: Brand: GLIDEWIRE

## (undated) DEVICE — SOFT SILICONE HYDROCELLULAR FOAM DRESSING WITH LOCK AWAY LAYER: Brand: ALLEVYN LIFE XL 21X21 CTN10

## (undated) DEVICE — FOOT & ANKLE SOFT DR WOMACK: Brand: MEDLINE INDUSTRIES, INC.

## (undated) DEVICE — SHEET, ORTHO, SPLIT, STERILE: Brand: MEDLINE

## (undated) DEVICE — MASTISOL ADHESIVE LIQ 2/3ML

## (undated) DEVICE — PAD,ABDOMINAL,5"X9",ST,LF,25/BX: Brand: MEDLINE INDUSTRIES, INC.

## (undated) DEVICE — 90-S ACCELERATOR, SUCTION PROBE, NON-BENDABLE, MAX CUT LEVEL 11: Brand: SERFAS ENERGY

## (undated) DEVICE — BANDAGE COMPR L5YDXW2IN FOAM CO FLX

## (undated) DEVICE — STRIP,CLOSURE,WOUND,MEDI-STRIP,1/2X4: Brand: MEDLINE

## (undated) DEVICE — DRESSING PETRO W3XL8IN OIL EMUL N ADH GZ KNIT IMPREG CELOS

## (undated) DEVICE — GUIDEWIRE WITH ICE™ HYDROPHILIC COATING: Brand: CHOICE™ PT

## (undated) DEVICE — Device

## (undated) DEVICE — BLADE SHV CUT MENIS AGG + 4MM --

## (undated) DEVICE — CATH BLLN SCORING 3.5X10MM X 137CM PTCA ANGIOSCULPT RX

## (undated) DEVICE — GARMENT,MEDLINE,DVT,INT,CALF,MED, GEN2: Brand: MEDLINE

## (undated) DEVICE — BANDAGE,ELASTIC,ESMARK,STERILE,4"X9',LF: Brand: MEDLINE

## (undated) DEVICE — SUTURE MCRYL SZ 3-0 L18IN ABSRB UD L19MM PS-2 3/8 CIR PRIM Y497G

## (undated) DEVICE — GOWN,SIRUS,NONRNF,SETINSLV,XL,20/CS: Brand: MEDLINE

## (undated) DEVICE — STERILE PVP: Brand: MEDLINE INDUSTRIES, INC.